# Patient Record
Sex: MALE | Race: BLACK OR AFRICAN AMERICAN
[De-identification: names, ages, dates, MRNs, and addresses within clinical notes are randomized per-mention and may not be internally consistent; named-entity substitution may affect disease eponyms.]

---

## 2019-06-16 ENCOUNTER — HOSPITAL ENCOUNTER (EMERGENCY)
Dept: HOSPITAL 75 - ER FS | Age: 64
Discharge: HOME | End: 2019-06-16
Payer: COMMERCIAL

## 2019-06-16 VITALS — DIASTOLIC BLOOD PRESSURE: 87 MMHG | SYSTOLIC BLOOD PRESSURE: 122 MMHG

## 2019-06-16 VITALS — WEIGHT: 315 LBS | HEIGHT: 67 IN | BODY MASS INDEX: 49.44 KG/M2
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
## 2019-06-16 DIAGNOSIS — S93.401A: Primary | ICD-10-CM

## 2019-06-16 PROCEDURE — 73610 X-RAY EXAM OF ANKLE: CPT

## 2019-06-16 NOTE — DIAGNOSTIC IMAGING REPORT
INDICATION: Right ankle injury. Pain



3 views of the right ankle show no fracture, dislocation or other

acute abnormality.



IMPRESSION: No acute abnormality is seen.



Dictated by: 



  Dictated on workstation # [id number]

## 2019-06-16 NOTE — ED LOWER EXTREMITY
General


Chief Complaint:  Lower Extremity


Stated Complaint:  RT ANKLE PAIN


Nursing Triage Note:  


Patient reports he jumped off a fork lift and landed wrong on his right ankle on




Thursday, states his ankle has hurt with ambulation and movement since Thursday.


Nursing Sepsis Screen:  No Definite Risk


Source:  patient


Exam Limitations:  no limitations





History of Present Illness


Date Seen by Provider:  Jun 16, 2019


Time Seen by Provider:  08:06


Initial Comments


The patient is a 63-year-old black male who reports that he jumped off a 

forklift on Thursday.  He does not believe that he rolled his ankle but had pain

almost immediately  upon landing.  The pain has continued and he had expected it

to be better by now


Onset:  last week


Severity:  moderate





Allergies and Home Medications


Allergies


Coded Allergies:  


     No Known Drug Allergies (Unverified , 6/16/19)





Patient Home Medication List


Home Medication List Reviewed:  Yes





Review of Systems


Constitutional:  see HPI


EENTM:  no symptoms reported


Respiratory:  no symptoms reported


Cardiovascular:  no symptoms reported


Gastrointestinal:  no symptoms reported


Genitourinary:  no symptoms reported


Musculoskeletal:  see HPI


Psychiatric/Neurological:  No Symptoms Reported





Past Medical-Social-Family Hx


Past Med/Social Hx:  Reviewed Nursing Past Med/Soc Hx


Patient Social History


Recent Foreign Travel:  No


Contact w/Someone Who Travel:  No


Recent Infectious Disease Expo:  No





Physical Exam


Vital Signs





Vital Signs - First Documented








 6/16/19





 07:57


 


Temp 98.7


 


Pulse 99


 


Resp 16


 


B/P (MAP) 122/87 (99)


 


Pulse Ox 97


 


O2 Delivery Room Air





Capillary Refill : Less Than 3 Seconds


Height, Weight, BMI


Height: 5'7.00"


Weight: 340lbs. oz. 154.327941ll;  BMI


Method:Stated


General Appearance:  mild distress


HEENT:  normal ENT inspection


Neck:  full range of motion


Cardiovascular:  normal peripheral pulses, regular rate, rhythm, no edema, no 

gallop, no JVD, no murmur


Respiratory:  chest non-tender, lungs clear, normal breath sounds, no r

espiratory distress, no accessory muscle use


Gastrointestinal:  normal bowel sounds, non tender, soft, no organomegaly, no 

pulsatile mass


Back:  normal inspection, no CVA tenderness, no vertebral tenderness


Examination of the feet shows them to be symmetrical by observation.  There is 

tenderness to palpation over the anterior surface of the Talus.  There is pain 

to passive inversion of the foot.





Progress/Results/Core Measures


Results/Orders


My Orders





Orders - NEVIN VELOZ MD


Ankle 3 View Right (6/16/19 08:04)





Vital Signs/I&O











 6/16/19





 07:57


 


Temp 98.7


 


Pulse 99


 


Resp 16


 


B/P (MAP) 122/87 (99)


 


Pulse Ox 97


 


O2 Delivery Room Air














Blood Pressure Mean:                    99











Departure


Communication (Admissions)


By my reading no fractures noted in the right foot or ankle.  A 4 inch Ace wrap 

was placed.





Impression





   Primary Impression:  


   right ankle sprain


Disposition:  01 HOME, SELF-CARE


Condition:  Stable/Unchanged





Departure-Patient Inst.


Decision time for Depature:  08:30


Patient Instructions:  Ankle Sprain (DC)





Add. Discharge Instructions:  


All discharge instructions reviewed with patient and/or family. Voiced 

understanding.





Use Ace wrap and supportive shoes.  Hot soaks of foot and ankle may be useful 

over the next few days.  Aleve 2 tablets twice daily or ibuprofen 3 tablets 3 

times daily will also be useful.  If no improvement over the next week see your 

provider for further evaluation











NEVIN VELOZ MD             Jun 16, 2019 08:09

## 2019-07-04 ENCOUNTER — HOSPITAL ENCOUNTER (EMERGENCY)
Dept: HOSPITAL 75 - ER FS | Age: 64
Discharge: HOME | End: 2019-07-04
Payer: SELF-PAY

## 2019-07-04 VITALS — DIASTOLIC BLOOD PRESSURE: 67 MMHG | SYSTOLIC BLOOD PRESSURE: 115 MMHG

## 2019-07-04 VITALS — HEIGHT: 67.5 IN | WEIGHT: 315 LBS | BODY MASS INDEX: 48.86 KG/M2

## 2019-07-04 DIAGNOSIS — I10: ICD-10-CM

## 2019-07-04 DIAGNOSIS — I95.1: Primary | ICD-10-CM

## 2019-07-04 LAB
ALBUMIN SERPL-MCNC: 3.6 GM/DL (ref 3.2–4.5)
ALP SERPL-CCNC: 51 U/L (ref 40–136)
ALT SERPL-CCNC: 18 U/L (ref 0–55)
APTT PPP: YELLOW S
BACTERIA #/AREA URNS HPF: NEGATIVE /HPF
BASOPHILS # BLD AUTO: 0.1 10^3/UL (ref 0–0.1)
BASOPHILS NFR BLD AUTO: 1 % (ref 0–10)
BILIRUB SERPL-MCNC: 0.5 MG/DL (ref 0.1–1)
BILIRUB UR QL STRIP: (no result)
BUN/CREAT SERPL: 16
CALCIUM SERPL-MCNC: 9.8 MG/DL (ref 8.5–10.1)
CHLORIDE SERPL-SCNC: 95 MMOL/L (ref 98–107)
CO2 SERPL-SCNC: 24 MMOL/L (ref 21–32)
CREAT SERPL-MCNC: 0.91 MG/DL (ref 0.6–1.3)
EOSINOPHIL # BLD AUTO: 0.2 10^3/UL (ref 0–0.3)
EOSINOPHIL NFR BLD AUTO: 2 % (ref 0–10)
ERYTHROCYTE [DISTWIDTH] IN BLOOD BY AUTOMATED COUNT: 15.8 % (ref 10–14.5)
FIBRINOGEN PPP-MCNC: CLEAR MG/DL
GFR SERPLBLD BASED ON 1.73 SQ M-ARVRAT: > 60 ML/MIN
GLUCOSE SERPL-MCNC: 82 MG/DL (ref 70–105)
GLUCOSE UR STRIP-MCNC: NEGATIVE MG/DL
HCT VFR BLD CALC: 44 % (ref 40–54)
HGB BLD-MCNC: 14.6 G/DL (ref 13.3–17.7)
HYALINE CASTS #/AREA URNS LPF: (no result) /LPF
KETONES UR QL STRIP: (no result)
LEUKOCYTE ESTERASE UR QL STRIP: NEGATIVE
LYMPHOCYTES # BLD AUTO: 1.8 X 10^3 (ref 1–4)
LYMPHOCYTES NFR BLD AUTO: 15 % (ref 12–44)
MANUAL DIFFERENTIAL PERFORMED BLD QL: NO
MCH RBC QN AUTO: 30 PG (ref 25–34)
MCHC RBC AUTO-ENTMCNC: 33 G/DL (ref 32–36)
MCV RBC AUTO: 91 FL (ref 80–99)
MONOCYTES # BLD AUTO: 0.8 X 10^3 (ref 0–1)
MONOCYTES NFR BLD AUTO: 7 % (ref 0–12)
NEUTROPHILS # BLD AUTO: 8.9 X 10^3 (ref 1.8–7.8)
NEUTROPHILS NFR BLD AUTO: 75 % (ref 42–75)
NITRITE UR QL STRIP: NEGATIVE
PH UR STRIP: 5.5 [PH] (ref 5–9)
PLATELET # BLD: 275 10^3/UL (ref 130–400)
PMV BLD AUTO: 10.8 FL (ref 7.4–10.4)
POTASSIUM SERPL-SCNC: 3.1 MMOL/L (ref 3.6–5)
PROT SERPL-MCNC: 7.9 GM/DL (ref 6.4–8.2)
PROT UR QL STRIP: (no result)
RBC #/AREA URNS HPF: (no result) /HPF
SODIUM SERPL-SCNC: 138 MMOL/L (ref 135–145)
SP GR UR STRIP: 1.02 (ref 1.02–1.02)
SQUAMOUS #/AREA URNS HPF: (no result) /HPF
UROBILINOGEN UR-MCNC: NORMAL MG/DL
WBC # BLD AUTO: 11.9 10^3/UL (ref 4.3–11)
WBC #/AREA URNS HPF: (no result) /HPF

## 2019-07-04 PROCEDURE — 81000 URINALYSIS NONAUTO W/SCOPE: CPT

## 2019-07-04 PROCEDURE — 80053 COMPREHEN METABOLIC PANEL: CPT

## 2019-07-04 PROCEDURE — 36415 COLL VENOUS BLD VENIPUNCTURE: CPT

## 2019-07-04 PROCEDURE — 71045 X-RAY EXAM CHEST 1 VIEW: CPT

## 2019-07-04 PROCEDURE — 84484 ASSAY OF TROPONIN QUANT: CPT

## 2019-07-04 PROCEDURE — 85025 COMPLETE CBC W/AUTO DIFF WBC: CPT

## 2019-07-04 PROCEDURE — 93005 ELECTROCARDIOGRAM TRACING: CPT

## 2019-07-04 NOTE — ED GENERAL
General


Chief Complaint:  Dizziness/Syncope


Stated Complaint:  MEDS CAUSING PT TO BE LIGHTHEADED


Source of Information:  Patient


Exam Limitations:  No Limitations





History of Present Illness


Date Seen by Provider:  Jul 4, 2019


Time Seen by Provider:  09:44


Initial Comments


This 63-year-old male presents with a complaint of orthostatic dizziness 

intermittently since he has switched his blood pressure medication.





Patient denies associated loss of consciousness or syncope, chest pain or 

palpitations, associated fever, chills, nausea or vomiting, lateralizing or 

localizing neurologic complaints, similar episode in the past, shortness of 

breath or cough, or use of other medication.





Further history from the patient reveals that he is actually taking both of his 

blood pressure medications (hydrochlorothiazide 25 mg one daily and lisinopril 

20 mg 1 daily).





Allergies and Home Medications


Allergies


Coded Allergies:  


     No Known Drug Allergies (Unverified , 6/16/19)





Patient Home Medication List


Home Medication List Reviewed:  Yes





Review of Systems


Review of Systems


Constitutional:  No chills; dizziness (orthostatic in nature and usually when 

the patient is outside and he is taking his current antihypertensive 

medication.)


EENTM:  no symptoms reported


Respiratory:  No cough, No short of breath


Cardiovascular:  No chest pain, No palpitations


Gastrointestinal:  No abdominal pain, No nausea, No vomiting


Genitourinary:  No decreased output, No dysuria


Musculoskeletal:  No back pain


Skin:  No change in color, No rash


Psychiatric/Neurological:  No Symptoms Reported


Hematologic/Lymphatic:  No Symptoms Reported


Immunological/Allergic:  no symptoms reported





Past Medical-Social-Family Hx


Past Med/Social Hx:  Reviewed Nursing Past Med/Soc Hx


Patient Social History


2nd Hand Smoke Exposure:  No


Recent Hopitalizations:  No





Seasonal Allergies


Seasonal Allergies:  No





Past Medical History


Surgeries:  No


Respiratory:  No


Cardiac:  Yes


Hypertension


Neurological:  No


Genitourinary:  No


Gastrointestinal:  No


Musculoskeletal:  No


Endocrine:  No


HEENT:  No


Cancer:  No


Psychosocial:  No


Integumentary:  No


Blood Disorders:  No





Physical Exam


Vital Signs





Vital Signs - First Documented








 7/4/19





 09:26


 


Temp 98.3


 


Pulse 89


 


Resp 20


 


B/P (MAP) 136/74 (94)


 


Pulse Ox 96


 


O2 Delivery Room Air





Capillary Refill :


Height, Weight, BMI


Height: 5'7.00"


Weight: 340lbs. oz. 154.372668gh;  BMI


Method:Stated


General Appearance:  No Apparent Distress, WD/WN


Eyes:  Bilateral Eye Normal Inspection


HEENT:  Normal ENT Inspection


Neck:  Normal Inspection, Supple


Respiratory:  Lungs Clear, Normal Breath Sounds


Cardiovascular:  Regular Rate, Rhythm, No Murmur


Gastrointestinal:  Normal Bowel Sounds, Non Tender, Soft


Extremity:  Normal Inspection


Neurologic/Psychiatric:  Oriented x3, No Motor/Sensory Deficits, Normal 

Mood/Affect


Skin:  Normal Color, Warm/Dry





Progress/Results/Core Measures


Suspected Sepsis


SIRS


Temperature: 


Pulse:  


Respiratory Rate: 


 


Laboratory Tests


7/4/19 09:38: White Blood Count 11.9H


Blood Pressure  / 


Mean: 


 





Laboratory Tests


7/4/19 09:38: 


Creatinine 0.91, Platelet Count 275, Total Bilirubin 0.5








Results/Orders


Lab Results





Laboratory Tests








Test


 7/4/19


09:38 7/4/19


10:06 Range/Units


 


 


White Blood Count


 11.9 H


 


 4.3-11.0


10^3/uL


 


Red Blood Count


 4.85 


 


 4.35-5.85


10^6/uL


 


Hemoglobin 14.6   13.3-17.7  G/DL


 


Hematocrit 44   40-54  %


 


Mean Corpuscular Volume 91   80-99  FL


 


Mean Corpuscular Hemoglobin 30   25-34  PG


 


Mean Corpuscular Hemoglobin


Concent 33 


 


 32-36  G/DL





 


Red Cell Distribution Width 15.8 H  10.0-14.5  %


 


Platelet Count


 275 


 


 130-400


10^3/uL


 


Mean Platelet Volume 10.8 H  7.4-10.4  FL


 


Neutrophils (%) (Auto) 75   42-75  %


 


Lymphocytes (%) (Auto) 15   12-44  %


 


Monocytes (%) (Auto) 7   0-12  %


 


Eosinophils (%) (Auto) 2   0-10  %


 


Basophils (%) (Auto) 1   0-10  %


 


Neutrophils # (Auto) 8.9 H  1.8-7.8  X 10^3


 


Lymphocytes # (Auto) 1.8   1.0-4.0  X 10^3


 


Monocytes # (Auto) 0.8   0.0-1.0  X 10^3


 


Eosinophils # (Auto)


 0.2 


 


 0.0-0.3


10^3/uL


 


Basophils # (Auto)


 0.1 


 


 0.0-0.1


10^3/uL


 


Sodium Level 138   135-145  MMOL/L


 


Potassium Level 3.1 L  3.6-5.0  MMOL/L


 


Chloride Level 95 L    MMOL/L


 


Carbon Dioxide Level 24   21-32  MMOL/L


 


Anion Gap 19 H  5-14  MMOL/L


 


Blood Urea Nitrogen 15   7-18  MG/DL


 


Creatinine


 0.91 


 


 0.60-1.30


MG/DL


 


Estimat Glomerular Filtration


Rate > 60 


 


  





 


BUN/Creatinine Ratio 16    


 


Glucose Level 82     MG/DL


 


Calcium Level 9.8   8.5-10.1  MG/DL


 


Corrected Calcium 10.1   8.5-10.1  MG/DL


 


Total Bilirubin 0.5   0.1-1.0  MG/DL


 


Aspartate Amino Transf


(AST/SGOT) 36 H


 


 5-34  U/L





 


Alanine Aminotransferase


(ALT/SGPT) 18 


 


 0-55  U/L





 


Alkaline Phosphatase 51     U/L


 


Troponin I < 0.30   <0.30  NG/ML


 


Total Protein 7.9   6.4-8.2  GM/DL


 


Albumin 3.6   3.2-4.5  GM/DL


 


Urine Color  YELLOW   


 


Urine Clarity  CLEAR   


 


Urine pH  5.5  5-9  


 


Urine Specific Gravity  1.025 H 1.016-1.022  


 


Urine Protein  TRACE  NEGATIVE  


 


Urine Glucose (UA)  NEGATIVE  NEGATIVE  


 


Urine Ketones  3+ H NEGATIVE  


 


Urine Nitrite  NEGATIVE  NEGATIVE  


 


Urine Bilirubin  2+ H NEGATIVE  


 


Urine Urobilinogen  NORMAL  NORMAL  MG/DL


 


Urine Leukocyte Esterase  NEGATIVE  NEGATIVE  


 


Urine RBC (Auto)  NEGATIVE  NEGATIVE  


 


Urine RBC  NONE   /HPF


 


Urine WBC  NONE   /HPF


 


Urine Squamous Epithelial


Cells 


 25-50 H


  /HPF





 


Urine Crystals  NONE   /LPF


 


Urine Bacteria  NEGATIVE   /HPF


 


Urine Casts  PRESENT   /LPF


 


Urine Hyaline Casts  2-5 H  /LPF


 


Urine Mucus  SMALL H  /LPF


 


Urine Culture Indicated  NO   








My Orders





Orders - JARRET WOODS MD


Ekg Tracing (7/4/19 09:41)


Chest 1 View Ap/Pa Only (7/4/19 09:41)


Cbc With Automated Diff (7/4/19 09:41)


Comprehensive Metabolic Panel (7/4/19 09:41)


Ua Culture If Indicated (7/4/19 09:41)


Troponin I (7/4/19 09:41)





Vital Signs/I&O











 7/4/19





 09:26


 


Temp 98.3


 


Pulse 89


 


Resp 20


 


B/P (MAP) 136/74 (94)


 


Pulse Ox 96


 


O2 Delivery Room Air





Capillary Refill :


Progress Note :  


   Time:  10:53


Progress Note


The patient demonstrated no significant orthostatic changes in the emergency 

department.





After was understood A she was taking both hydrochlorothiazide and lisinopril 

the patient was counseled that the 2 medications together appeared to be causing

him orthostatic hypotension.





It was recommended patient utilize his hydrochlorothiazide 25 mg daily and with 

hold the lisinopril currently.





I follow up with his caregiver after the holiday weekend.  I believe further 

monitoring of his blood pressure is in order to see if he requires the second 

antihypertensive medication.





Departure


Impression





   Primary Impression:  


   Orthostatic hypotension


Disposition:  01 HOME, SELF-CARE


Condition:  Unchanged





Departure-Patient Inst.


Decision time for Depature:  10:55


Referrals:  


[facility]





INDIA,LOCAL PHYSICIAN (PCP)


Primary Care Physician


Patient Instructions:  Orthostatic Hypotension (DC)





Add. Discharge Instructions:  


Take your hydrochlorothiazide as prescribed (25 mg daily).  Stop your 

lisinopril.  Follow-up with your care giver of choice on Monday.  If possible 

monitor your blood pressure in the interim.  Return if any problems or 

questions.  All discharge instructions reviewed with patient and/or family. 

Voiced understanding.











JARRET WOODS MD              Jul 4, 2019 09:54

## 2019-07-04 NOTE — DIAGNOSTIC IMAGING REPORT
Chest one view at 9:37 a.m.



INDICATION: Dizziness, hypertension.



There are no prior studies available for comparison.



FINDINGS: The heart size is within normal limits. The lungs are

clear. There is no evidence for failure, pneumonia or for pleural

effusion. The mediastinum is not widened. The osseous structures

are intact.



IMPRESSION: There is no evidence for an acute cardiopulmonary

abnormality.



Dictated by: 



  Dictated on workstation # [id number]

## 2020-03-25 ENCOUNTER — HOSPITAL ENCOUNTER (EMERGENCY)
Dept: HOSPITAL 75 - ER FS | Age: 65
Discharge: HOME | End: 2020-03-25
Payer: COMMERCIAL

## 2020-03-25 VITALS — WEIGHT: 250 LBS | BODY MASS INDEX: 38.78 KG/M2 | HEIGHT: 67.32 IN

## 2020-03-25 VITALS — DIASTOLIC BLOOD PRESSURE: 90 MMHG | SYSTOLIC BLOOD PRESSURE: 138 MMHG

## 2020-03-25 DIAGNOSIS — E87.6: ICD-10-CM

## 2020-03-25 DIAGNOSIS — K05.6: ICD-10-CM

## 2020-03-25 DIAGNOSIS — D72.829: ICD-10-CM

## 2020-03-25 DIAGNOSIS — M54.16: Primary | ICD-10-CM

## 2020-03-25 LAB
ALBUMIN SERPL-MCNC: 3.5 GM/DL (ref 3.2–4.5)
ALP SERPL-CCNC: 61 U/L (ref 40–136)
ALT SERPL-CCNC: 7 U/L (ref 0–55)
ANISOCYTOSIS BLD QL SMEAR: SLIGHT
APTT PPP: YELLOW S
BACTERIA #/AREA URNS HPF: (no result) /HPF
BARBITURATES UR QL: NEGATIVE
BASOPHILS # BLD AUTO: 0 10^3/UL (ref 0–0.1)
BASOPHILS NFR BLD AUTO: 0 % (ref 0–10)
BASOPHILS NFR BLD MANUAL: 0 %
BENZODIAZ UR QL SCN: NEGATIVE
BILIRUB SERPL-MCNC: 1 MG/DL (ref 0.1–1)
BILIRUB UR QL STRIP: NEGATIVE
BUN/CREAT SERPL: 8
CALCIUM SERPL-MCNC: 9.2 MG/DL (ref 8.5–10.1)
CHLORIDE SERPL-SCNC: 92 MMOL/L (ref 98–107)
CO2 SERPL-SCNC: 37 MMOL/L (ref 21–32)
COCAINE UR QL: NEGATIVE
CREAT SERPL-MCNC: 0.9 MG/DL (ref 0.6–1.3)
EOSINOPHIL # BLD AUTO: 0.1 10^3/UL (ref 0–0.3)
EOSINOPHIL NFR BLD AUTO: 0 % (ref 0–10)
EOSINOPHIL NFR BLD MANUAL: 0 %
ERYTHROCYTE [DISTWIDTH] IN BLOOD BY AUTOMATED COUNT: 15.9 % (ref 10–14.5)
FIBRINOGEN PPP-MCNC: CLEAR MG/DL
GFR SERPLBLD BASED ON 1.73 SQ M-ARVRAT: > 60 ML/MIN
GLUCOSE SERPL-MCNC: 103 MG/DL (ref 70–105)
GLUCOSE UR STRIP-MCNC: NEGATIVE MG/DL
HCT VFR BLD CALC: 38 % (ref 40–54)
HGB BLD-MCNC: 13.4 G/DL (ref 13.3–17.7)
KETONES UR QL STRIP: NEGATIVE
LEUKOCYTE ESTERASE UR QL STRIP: NEGATIVE
LYMPHOCYTES # BLD AUTO: 1.4 X 10^3 (ref 1–4)
LYMPHOCYTES NFR BLD AUTO: 10 % (ref 12–44)
MCH RBC QN AUTO: 34 PG (ref 25–34)
MCHC RBC AUTO-ENTMCNC: 35 G/DL (ref 32–36)
MCV RBC AUTO: 97 FL (ref 80–99)
METHADONE UR QL SCN: NEGATIVE
METHAMPHETAMINE SCREEN URINE S: NEGATIVE
MONOCYTES # BLD AUTO: 0.9 X 10^3 (ref 0–1)
MONOCYTES NFR BLD AUTO: 6 % (ref 0–12)
MONOCYTES NFR BLD: 9 %
NEUTROPHILS # BLD AUTO: 11.7 X 10^3 (ref 1.8–7.8)
NEUTROPHILS NFR BLD AUTO: 83 % (ref 42–75)
NEUTS BAND NFR BLD MANUAL: 79 %
NEUTS BAND NFR BLD: 2 %
NITRITE UR QL STRIP: NEGATIVE
OPIATES UR QL SCN: NEGATIVE
OXYCODONE UR QL: NEGATIVE
PH UR STRIP: 8 [PH] (ref 5–9)
PLATELET # BLD: 198 10^3/UL (ref 130–400)
PMV BLD AUTO: 10.6 FL (ref 7.4–10.4)
POTASSIUM SERPL-SCNC: 2.6 MMOL/L (ref 3.6–5)
PROPOXYPH UR QL: NEGATIVE
PROT SERPL-MCNC: 7.4 GM/DL (ref 6.4–8.2)
PROT UR QL STRIP: NEGATIVE
RBC #/AREA URNS HPF: (no result) /HPF
SODIUM SERPL-SCNC: 140 MMOL/L (ref 135–145)
SP GR UR STRIP: 1.01 (ref 1.02–1.02)
SQUAMOUS #/AREA URNS HPF: (no result) /HPF
TRICYCLICS UR QL SCN: POSITIVE
VARIANT LYMPHS NFR BLD MANUAL: 10 %
WBC # BLD AUTO: 14.1 10^3/UL (ref 4.3–11)
WBC #/AREA URNS HPF: (no result) /HPF

## 2020-03-25 PROCEDURE — 81000 URINALYSIS NONAUTO W/SCOPE: CPT

## 2020-03-25 PROCEDURE — 80306 DRUG TEST PRSMV INSTRMNT: CPT

## 2020-03-25 PROCEDURE — 87088 URINE BACTERIA CULTURE: CPT

## 2020-03-25 PROCEDURE — 85027 COMPLETE CBC AUTOMATED: CPT

## 2020-03-25 PROCEDURE — 36415 COLL VENOUS BLD VENIPUNCTURE: CPT

## 2020-03-25 PROCEDURE — 85007 BL SMEAR W/DIFF WBC COUNT: CPT

## 2020-03-25 PROCEDURE — 72100 X-RAY EXAM L-S SPINE 2/3 VWS: CPT

## 2020-03-25 PROCEDURE — 80053 COMPREHEN METABOLIC PANEL: CPT

## 2020-03-25 NOTE — ED BACK PAIN
General


Chief Complaint:  Back Problems


Stated Complaint:  BACK/PIO SIDE PAIN


Source of Information:  Patient


Exam Limitations:  No Limitations (patient seems to have been impaired 

intellect)





History of Present Illness


Date Seen by Provider:  Mar 25, 2020


Time Seen by Provider:  12:11


Initial Comments


64-year-old -American male presents to the emergency room for the second 

time this week with low back pain. Patient states he has occasional spasms going

down the lateral aspect of both legs. He states he has no loss of control of his

bowel or bladder. 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 

increased pain over the past few days and came into the emergency room and 

received muscle relaxants in the past. Patient has no history of falls he has no

history of surgery in the back he has no pain on pressing the spinous processes 

from his cervical down to his sacral areas. Patient has given informed consent 

for diagnostic and therapeutic services. He denies any other serious medical 

problems including cardiovascular pulmonary GI or renal.


Location:  Lumbar Spine (with some radiation into the lateral thighs bilaterally

with spasms)


Timing/Duration:  3-4 Days


Severity:  Moderate


Pain/Injury Location:  Back, Lower Extremity


Radiation:  Upper Legs (lateral thighs bilaterally)


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


Modifying Factors:  Improves With Movement, Improves With Rest


Associated Symptoms:  muscle spasms, weakness, lower back pain





Allergies and Home Medications


Allergies


Coded Allergies:  


     No Known Drug Allergies (Unverified , 6/16/19)





Home Medications


Cyclobenzaprine HCl 10 Mg Tablet, 10 MG PO TID PRN for SPASMS


   Prescribed by: HODA BOB on 3/25/20 1404


Ibuprofen 600 Mg Tablet, 600 MG PO Q6H PRN for PAIN-MILD


   Prescribed by: HODA BOB on 3/25/20 1404





Patient Home Medication List


Home Medication List Reviewed:  Yes





Review of Systems


Constitutional:  weakness (secondary to spasms in the lateral thighs however 

does not report weakness when he is not in spasms)


EENTM:  no symptoms reported, dental problems


Respiratory:  no symptoms reported


Cardiovascular:  no symptoms reported


Gastrointestinal:  no symptoms reported


Genitourinary:  no symptoms reported (denies cauda equina syndrome symptoms)


Musculoskeletal:  see HPI, back pain, muscle pain, muscle stiffness, muscle 

cramps


Skin:  no symptoms reported


Psychiatric/Neurological:  Anxiety





Past Medical-Social-Family Hx


Past Med/Social Hx:  Reviewed Nursing Past Med/Soc Hx


Patient Social History


Alcohol Use:  Denies Use


Recreational Drug Use:  No


Smoking Status:  Unknown if Ever Smoked


Type Used:  Smokeless Tobacco


2nd Hand Smoke Exposure:  No


Recent Foreign Travel:  No


Contact w/Someone Who Travel:  No


Recent Hopitalizations:  No





Seasonal Allergies


Seasonal Allergies:  No





Past Medical History


Surgeries:  No


Respiratory:  No


Cardiac:  Yes


Hypertension


Neurological:  No


Genitourinary:  No


Gastrointestinal:  No


Musculoskeletal:  No


Endocrine:  No


HEENT:  No


Cancer:  No


Psychosocial:  No


Integumentary:  No


Blood Disorders:  No





Physical Exam


Vital Signs





Vital Signs - First Documented








 3/25/20





 12:27


 


Temp 36.6


 


Pulse 101


 


Resp 18


 


B/P (MAP) 138/90 (106)


 


Pulse Ox 97





Capillary Refill :


Height, Weight, BMI


Height: 5'7.50"


Weight: 365lbs. 0oz. [id number];  BMI


Method:Estimated


General Appearance:  WD/WN (but morbidly obese), Moderate Distress (secondary to

low back pain)


HEENT:  PERRL/EOMI, Normal ENT Inspection, Pharynx Normal, Other (periodontal 

disease and missing teeth)


Neck:  Full Range of Motion, Normal Inspection, Non Tender, Supple


Cardiovascular:  Regular Rate, Rhythm, No Edema, No Gallop, No JVD, No Murmur, 

Normal Peripheral Pulses


Respiratory:  Chest Non Tender, Lungs Clear, Normal Breath Sounds, No Accessory 

Muscle Use, No Respiratory Distress


Peripheral Pulses:  2+ Carotid (R), 2+ Carotid (L), 2+ Radial Pulses (R), 2+ 

Radial Pulses (L)


Gastrointestinal:  Normal Bowel Sounds (morbidly obese difficult abdominal 

exam), Non Tender, Soft


Back:  CVA Tenderness (L), CVA Tenderness (R), Decreased Range of Motion, Muscle

Spasm, Other (bilateral radiculopathy lateral thighs L3 4)


Extremity:  Normal Capillary Refill, Normal Inspection, Normal Range of Motion, 

Non Tender, No Calf Tenderness, No Pedal Edema


Neurologic/Psychiatric:  Alert, Oriented x3, No Motor/Sensory Deficits (appears 

to have some intellectual deficits), Normal Mood/Affect (but anxious), CNs II-

XII Norm as Tested


Skin:  Normal Color, Warm/Dry (no open skin lesions)


Lymphatic:  No Adenopathy





Progress/Results/Core Measures


Results/Orders


Lab Results





Laboratory Tests








Test


 3/25/20


12:52 3/25/20


13:05 Range/Units


 


 


White Blood Count


 14.1 H


 


 4.3-11.0


10^3/uL


 


Red Blood Count


 3.96 L


 


 4.35-5.85


10^6/uL


 


Hemoglobin 13.4   13.3-17.7  G/DL


 


Hematocrit 38 L  40-54  %


 


Mean Corpuscular Volume 97   80-99  FL


 


Mean Corpuscular Hemoglobin 34   25-34  PG


 


Mean Corpuscular Hemoglobin


Concent 35 


 


 32-36  G/DL





 


Red Cell Distribution Width 15.9 H  10.0-14.5  %


 


Platelet Count


 198 


 


 130-400


10^3/uL


 


Mean Platelet Volume 10.6 H  7.4-10.4  FL


 


Neutrophils (%) (Auto) 83 H  42-75  %


 


Lymphocytes (%) (Auto) 10 L  12-44  %


 


Monocytes (%) (Auto) 6   0-12  %


 


Eosinophils (%) (Auto) 0   0-10  %


 


Basophils (%) (Auto) 0   0-10  %


 


Neutrophils # (Auto) 11.7 H  1.8-7.8  X 10^3


 


Lymphocytes # (Auto) 1.4   1.0-4.0  X 10^3


 


Monocytes # (Auto) 0.9   0.0-1.0  X 10^3


 


Eosinophils # (Auto)


 0.1 


 


 0.0-0.3


10^3/uL


 


Basophils # (Auto)


 0.0 


 


 0.0-0.1


10^3/uL


 


Neutrophils % (Manual) 79    %


 


Lymphocytes % (Manual) 10    %


 


Monocytes % (Manual) 9    %


 


Eosinophils % (Manual) 0    %


 


Basophils % (Manual) 0    %


 


Band Neutrophils 2    %


 


Anisocytosis SLIGHT    


 


Sodium Level 140   135-145  MMOL/L


 


Potassium Level 2.6 L  3.6-5.0  MMOL/L


 


Chloride Level 92 L    MMOL/L


 


Carbon Dioxide Level 37 H  21-32  MMOL/L


 


Anion Gap 11   5-14  MMOL/L


 


Blood Urea Nitrogen 7   7-18  MG/DL


 


Creatinine


 0.90 


 


 0.60-1.30


MG/DL


 


Estimat Glomerular Filtration


Rate > 60 


 


  





 


BUN/Creatinine Ratio 8    


 


Glucose Level 103     MG/DL


 


Calcium Level 9.2   8.5-10.1  MG/DL


 


Corrected Calcium 9.6   8.5-10.1  MG/DL


 


Total Bilirubin 1.0   0.1-1.0  MG/DL


 


Aspartate Amino Transf


(AST/SGOT) 16 


 


 5-34  U/L





 


Alanine Aminotransferase


(ALT/SGPT) 7 


 


 0-55  U/L





 


Alkaline Phosphatase 61     U/L


 


Total Protein 7.4   6.4-8.2  GM/DL


 


Albumin 3.5   3.2-4.5  GM/DL


 


Urine Color  YELLOW   


 


Urine Clarity  CLEAR   


 


Urine pH  8.0  5-9  


 


Urine Specific Gravity  1.015 L 1.016-1.022  


 


Urine Protein  NEGATIVE  NEGATIVE  


 


Urine Glucose (UA)  NEGATIVE  NEGATIVE  


 


Urine Ketones  NEGATIVE  NEGATIVE  


 


Urine Nitrite  NEGATIVE  NEGATIVE  


 


Urine Bilirubin  NEGATIVE  NEGATIVE  


 


Urine Urobilinogen  0.2  < = 1.0  MG/DL


 


Urine Leukocyte Esterase  NEGATIVE  NEGATIVE  


 


Urine RBC (Auto)  NEGATIVE  NEGATIVE  


 


Urine RBC  NONE   /HPF


 


Urine WBC  5-10 H  /HPF


 


Urine Squamous Epithelial


Cells 


 0-2 


  /HPF





 


Urine Crystals  NONE   /LPF


 


Urine Bacteria  TRACE   /HPF


 


Urine Casts  NONE   /LPF


 


Urine Mucus  NEGATIVE   /LPF


 


Urine Culture Indicated  YES   


 


Urine Opiates Screen  NEGATIVE  NEGATIVE  


 


Urine Oxycodone Screen  NEGATIVE  NEGATIVE  


 


Urine Methadone Screen  NEGATIVE  NEGATIVE  


 


Urine Propoxyphene Screen  NEGATIVE  NEGATIVE  


 


Urine Barbiturates Screen  NEGATIVE  NEGATIVE  


 


Ur Tricyclic Antidepressants


Screen 


 POSITIVE H


 NEGATIVE  





 


Urine Phencyclidine Screen  NEGATIVE  NEGATIVE  


 


Urine Amphetamines Screen  NEGATIVE  NEGATIVE  


 


Urine Methamphetamines Screen  NEGATIVE  NEGATIVE  


 


Urine Benzodiazepines Screen  NEGATIVE  NEGATIVE  


 


Urine Cocaine Screen  NEGATIVE  NEGATIVE  


 


Urine Cannabinoids Screen  NEGATIVE  NEGATIVE  








My Orders





Orders - HODA BOB H DO


Lumbar Spine 2 Or 3 View (3/25/20 12:26)


Urinalysis (3/25/20 12:26)


Drug Screen Stat (Urine) (3/25/20 12:26)


Cbc And Manual Diff (3/25/20 12:26)


Comprehensive Metabolic Panel (3/25/20 12:26)


Urine Culture (3/25/20 13:05)


Ketorolac Injection (Toradol Injection) (3/25/20 14:15)





Vital Signs/I&O











 3/25/20 3/25/20





 12:27 14:20


 


Temp 36.6 36.6


 


Pulse 101 101


 


Resp 18 18


 


B/P (MAP) 138/90 (106) 138/90 (106)


 


Pulse Ox 97 97











Progress


Progress Note :  


   Time:  13:46


Progress Note


Diagnostic evaluation reveals a leukocytosis white count of 14.1 potassium 2.6 

urine drug screen positive for tricyclics patient's UA shows 5-10 WBCs per high-

power field slightly elevated and LS-spine shows degenerative joint disease. 

Patient does have mild upper respiratory tract infection in addition to lumbar 

arthritis. Recommendation is to drink additional fluids vitamin C thousand 

milligrams a day and ibuprofen for lumbago. Patient does have a mild 

leukocytosis which could be explained by the URI however if he has fevers or 

feels a decompensate she should return emergency room. Patient is hypokalemic 

and will be given oral potassium chloride. Patient subsequently admitted that he

is taking medication for tuberculosis exposure although he says at the urgent 

care clinic [facility] clinic is provided [facility]. Patient agrees increase 

his fluids vitamin C 1000 mg a day ibuprofen and will get a shot of 60 mg of 

Toradol today. He will follow-up with the [facility] clinic. The patient is 

aware that his white count is elevated could be from his periodontal disease and

his mild URI but will contact us or the urgent care clinic if his temperature 

goes up or he feels that he is getting worse. Patient has asked for refill of 

his muscle relaxant.





Departure


Impression





   Primary Impression:  


   Lumbar radiculopathy


   Additional Impressions:  


   Chronic back pain


   Hypokalemia


   Leukocytosis


   Periodontal disease


Disposition:  01 HOME, SELF-CARE


Condition:  Improved





Departure-Patient Inst.


Decision time for Depature:  13:52


Referrals:  


[facility]/LANCE OSBORNE,LOCAL PHYSICIAN (PCP)


Primary Care Physician


Patient Instructions:  Ibuprofen, Potassium Chloride, Low Back Pain  (DC), 

MANAGING YOUR CHRONIC PAIN, Hypokalemia (DC), Chronic Pain





Add. Discharge Instructions:  


Patient with low back pain and muscle spasms. He has arthritis on imaging. 

Toradol 60 mg IM given patient needs to hydrate. He will continue on Flexeril 10

mg 3 times a day. Patient also will continue on ibuprofen 600 mg 3 times a day 

with increased hydration. He is recommended to take potassium chloride 20 mEq 

twice a day for a week follow-up with his provider at the [facility]. Patient also wants Flexeril 10 mg 3 times a day. Patient understands he 

should not mix alcohol and he should stop using chew tobacco was is causing 

significant periodontal disease





All discharge instructions reviewed with patient and/or family. Voiced 

understanding.


Scripts


Ibuprofen (Ibuprofen) 600 Mg Tablet


600 MG PO Q6H PRN for PAIN-MILD for 10 Days, #30 TAB


   Prov: HODA BOB DO         3/25/20 


Cyclobenzaprine HCl (Cyclobenzaprine HCl) 10 Mg Tablet


10 MG PO TID PRN for SPASMS for 10 Days, #30 TAB


   Prov: HODA BOB DO         3/25/20


Work/School Note:  Work Release Form   Date Seen in the Emergency Department:  

Mar 25, 2020


   Return to Work:  Mar 25, 2020


      Other Restrictions Listed Below:  needs to stretch low back


Patient needs to stretch his low back continue Motrin 600 3 times a day as 

needed Flexeril 10 mg 3 times a day as needed K-Dur 20 milliequivalents twice a 

day as needed follow-up with community care clinic patient has received Toradol 

60 mg IM. Patient encouraged to stop using chewed tobacco for his periodontal 

disease.











HODA BOB DO            Mar 25, 2020 12:33

## 2020-03-25 NOTE — DIAGNOSTIC IMAGING REPORT
Examination: Lumbar spine, 3 views



INDICATION: Low back pain and bilateral pelvic/hip pain.



COMPARISON: None available.



FINDINGS:

There are 5 non-rib-bearing lumbar-type vertebral bodies. There

is mild dextroscoliosis of the lumbar spine. There is no fracture

or acute osseous abnormality. There is mild anterior height loss

of the T12 vertebral body, which is likely developmental in

nature. Vertebral body heights are maintained. There is mild

degenerative change involving the lumbar spine, with anterior

osteophyte formation and facet arthropathy demonstrated at

multiple levels. Intervertebral disc spaces are fairly

well-preserved. There is no evidence of subluxation. The SI

joints are unremarkable. Surgical clips are demonstrated in the

right upper abdominal quadrant. Regional soft tissues are

otherwise unremarkable.



IMPRESSION:

Degenerative changes of the lumbar spine, as detailed above,

without evidence of acute fracture or subluxation.



Dictated by: 



  Dictated on workstation # [id number]

## 2020-03-25 NOTE — XMS REPORT
Continuity of Care Document

                             Created on: 2020



Nevin Chacon

External Reference #: [id number]

: 1955

Sex: Male



Demographics





                          Address                   [address] [place] [address]  [id number]

 

                          Home Phone                [phone] x

 

                          Preferred Language        Unknown

 

                          Marital Status            Unknown

 

                          Rastafarian Affiliation     Unknown

 

                          Race                      Unknown

 

                          Ethnic Group              Unknown





Author





                          Organization              Unknown

 

                          Address                   Unknown

 

                          Phone                     Unavailable



              



Allergies

      



             Active           Description           Code           Type         

  Severity   

                Reaction           Onset           Reported/Identified          

 

Relationship to Patient                 Clinical Status        

 

                Yes             No Known Drug Allergies           [id number]    

       Drug 

Allergy           Unknown           N/A                             2019  

      

                                                             



                  



Medications

      



There is no data.                  



Problems

      



             Date Dx Coded           Attending           Type           Code    

       

Diagnosis                               Diagnosed By        

 

                2019           NEVIN VELOZ MD           Ot           

   M25.571         

                          PAIN IN RIGHT ANKLE AND JOINTS OF RIGHT               

      

 

                2019           NEVIN VELOZ MD           Ot           

   S93.401A        

                          SPRAIN OF UNSPECIFIED LIGAMENT OF RIGHT               

      

 

                2019           NEVIN VELOZ MD           Ot           

   W17.89XA        

                          OTHER FALL FROM ONE LEVEL TO ANOTHER, IN              

      

 

                2019           NEVIN VELOZ MD           Ot           

   Y93.39          

                          ACTIVITY, OTH INVOLVING CLIMBING, RAPPEL              

      

 

                2019           NEVIN VELOZ MD           Ot           

   M25.571         

                          PAIN IN RIGHT ANKLE AND JOINTS OF RIGHT               

      

 

                2019           NEVIN VELOZ MD           Ot           

   S93.401A        

                          SPRAIN OF UNSPECIFIED LIGAMENT OF RIGHT               

      

 

                2019           NEVIN VELOZ MD           Ot           

   W17.89XA        

                          OTHER FALL FROM ONE LEVEL TO ANOTHER, IN              

      

 

                2019           NEVIN VELOZ MD           Ot           

   Y93.39          

                          ACTIVITY, OTH INVOLVING CLIMBING, RAPPEL              

      

 

             2019           JARRET WOODS MD           Ot           I10 

          

ESSENTIAL (PRIMARY) HYPERTENSION                    

 

             2019           JARRET WOODS MD           Ot           I95.

1           

ORTHOSTATIC HYPOTENSION                          

 

             2019           JARRET WOODS MD           Ot           R42 

          

DIZZINESS AND GIDDINESS                          

 

             2019           JARRET WOODS MD           Ot           I10 

          

ESSENTIAL (PRIMARY) HYPERTENSION                    

 

             2019           JARRET WOODS MD           Ot           I95.

1           

ORTHOSTATIC HYPOTENSION                          

 

             2019           JARRET WOODS MD           Ot           R42 

          

DIZZINESS AND GIDDINESS                          

 

             2019           JARRET WOODS MD           Ot           I10 

          

ESSENTIAL (PRIMARY) HYPERTENSION                    

 

             2019           PEGGY MD, JARRET S           Ot           I95.

1           

ORTHOSTATIC HYPOTENSION                          

 

             2019           PEGGY LOPEZ, JARRET COBB           Ot           R42 

          

DIZZINESS AND GIDDINESS                          



                                                  



Procedures

      



There is no data.                  



Results

      



                    Test                Result              Range        

 

                                        Complete blood count (CBC) with automate

d white blood cell (WBC) differential - 

19 09:38         

 

                          Blood leukocytes automated count (number/volume)      

     11.9 10*3/uL         

                                        4.3-11.0        

 

                          Blood erythrocytes automated count (number/volume)    

       4.85 10*6/uL       

                                        4.35-5.85        

 

                    Venous blood hemoglobin measurement (mass/volume)           

14.6 g/dL           

13.3-17.7        

 

                    Blood hematocrit (volume fraction)           44 %           

     40-54        

 

                    Automated erythrocyte mean corpuscular volume           91 [

foz_us]           

80-99        

 

                                        Automated erythrocyte mean corpuscular h

emoglobin (mass per erythrocyte)        

                          30 pg                     25-34        

 

                                        Automated erythrocyte mean corpuscular h

emoglobin concentration measurement 

(mass/volume)             33 g/dL                   32-36        

 

                    Automated erythrocyte distribution width ratio           15.

8 %              10.0-

14.5        

 

                    Automated blood platelet count (count/volume)           275 

10*3/uL           

130-400        

 

                          Automated blood platelet mean volume measurement      

     10.8 [foz_us]        

                                        7.4-10.4        

 

                    Automated blood neutrophils/100 leukocytes           75 %   

             42-75       

 

 

                    Automated blood lymphocytes/100 leukocytes           15 %   

             12-44       

 

 

                    Blood monocytes/100 leukocytes           7 %                

 0-12        

 

                    Automated blood eosinophils/100 leukocytes           2 %    

             0-10        

 

                    Automated blood basophils/100 leukocytes           1 %      

           0-10        

 

                    Blood neutrophils automated count (number/volume)           

8.9 10*3            

1.8-7.8        

 

                    Blood lymphocytes automated count (number/volume)           

1.8 10*3            

1.0-4.0        

 

                    Blood monocytes automated count (number/volume)           0.

8 10*3            

0.0-1.0        

 

                    Automated eosinophil count           0.2 10*3/uL           0

.0-0.3        

 

                    Automated blood basophil count (count/volume)           0.1 

10*3/uL           

0.0-0.1        

 

                                        Comprehensive metabolic panel - 19

 09:38         

 

                          Serum or plasma sodium measurement (moles/volume)     

      138 mmol/L          

                                        135-145        

 

                          Serum or plasma potassium measurement (moles/volume)  

         3.1 mmol/L       

                                        3.6-5.0        

 

                          Serum or plasma chloride measurement (moles/volume)   

        95 mmol/L         

                                                

 

                    Carbon dioxide           24 mmol/L           21-32        

 

                          Serum or plasma anion gap determination (moles/volume)

           19 mmol/L      

                                        5-14        

 

                          Serum or plasma urea nitrogen measurement (mass/volume

)           15 mg/dL      

                                        7-18        

 

                          Serum or plasma creatinine measurement (mass/volume)  

         0.91 mg/dL       

                                        0.60-1.30        

 

                    Serum or plasma urea nitrogen/creatinine mass ratio         

  16                  NRG 

       

 

                                        Serum or plasma creatinine measurement w

ith calculation of estimated glomerular 

filtration rate           >                         NRG        

 

                    Serum or plasma glucose measurement (mass/volume)           

82 mg/dL            

        

 

                    Serum or plasma calcium measurement (mass/volume)           

9.8 mg/dL           

8.5-10.1        

 

                          Serum or plasma total bilirubin measurement (mass/volu

me)           0.5 mg/dL   

                                        0.1-1.0        

 

                                        Serum or plasma alkaline phosphatase nelson

surement (enzymatic activity/volume)    

                          51 U/L                            

 

                                        Serum or plasma aspartate aminotransfera

se measurement (enzymatic 

activity/volume)           36 U/L                    5-34        

 

                                        Serum or plasma alanine aminotransferase

 measurement (enzymatic activity/volume)

                          18 U/L                    0-55        

 

                    Serum or plasma protein measurement (mass/volume)           

7.9 g/dL            

6.4-8.2        

 

                    Serum or plasma albumin measurement (mass/volume)           

3.6 g/dL            

3.2-4.5        

 

                    CALCIUM CORRECTED           10.1 mg/dL           8.5-10.1   

     

 

                                        Serum or plasma troponin i.cardiac measu

rement (mass/volume) - 19 09:38   

      

 

                          Serum or plasma troponin i.cardiac measurement (mass/v

olume)           < ng/mL  

                                        <0.30        

 

                                        Complete urinalysis with reflex to cultu

re - 19 10:06         

 

                    Urine color determination           YELLOW              NRG 

       

 

                    Urine clarity determination           CLEAR               NR

G        

 

                    Urine pH measurement by test strip           5.5            

     5-9        

 

                    Specific gravity of urine by test strip           1.025     

          1.016-1.022  

      

 

                    Urine protein assay by test strip, semi-quantitative        

   TRACE               

NEGATIVE        

 

                    Urine glucose detection by automated test strip           NE

GATIVE            

NEGATIVE        

 

                          Erythrocytes detection in urine sediment by light micr

oscopy           NEGATIVE 

                                        NEGATIVE        

 

                    Urine ketones detection by automated test strip           3+

                  NEGATIVE

        

 

                    Urine nitrite detection by test strip           NEGATIVE    

        NEGATIVE    

    

 

                    Urine total bilirubin detection by test strip           2+  

                NEGATIVE  

      

 

                          Urine urobilinogen measurement by automated test strip

 (mass/volume)           

NORMAL                                  NORMAL        

 

                    Urine leukocyte esterase detection by dipstick           NEG

ATIVE            

NEGATIVE        

 

                                        Automated urine sediment erythrocyte cou

nt by microscopy (number/high power 

field)                    NONE                      NRG        

 

                                        Automated urine sediment leukocyte count

 by microscopy (number/high power field)

                          NONE                      NRG        

 

                          Bacteria detection in urine sediment by light microsco

py           NEGATIVE     

                                        NRG        

 

                                        Squamous epithelial cells detection in u

rine sediment by light microscopy       

                          25-50                     NRG        

 

                          Crystals detection in urine sediment by light microsco

py           NONE         

                                        NRG        

 

                          Casts detection in urine sediment by light microscopy 

          PRESENT         

                                        NRG        

 

                          Mucus detection in urine sediment by light microscopy 

          SMALL           

                                        NRG        

 

                    Complete urinalysis with reflex to culture           NO     

             NRG        

 

                          Hyaline casts detection in urine sediment by light apurva

roscopy           2-5     

                                        NRG        

 

                                        CMP - 20 16:08         

 

                    GLUCOSE             90 mg/dL            65-99        

 

                    UREA NITROGEN (BUN)           6 mg/dL             7-25      

  

 

                    CREATININE           0.75 mg/dL           0.70-1.25        

 

                    eGFR NON-AFR. AMERICAN           97 mL/min/1.73m2           

> OR = 60        

 

                    eGFR            112 mL/min/1.73m2           

> OR = 60        

 

                    BUN/CREATININE RATIO           8 (calc)            6-22     

   

 

                    SODIUM              142 mmol/L           135-146        

 

                    POTASSIUM           2.8 mmol/L           3.5-5.3        

 

                    CHLORIDE            97 mmol/L                   

 

                    CARBON DIOXIDE           35 mmol/L           20-32        

 

                    CALCIUM             8.9 mg/dL           8.6-10.3        

 

                    PROTEIN, TOTAL           7.0 g/dL            6.1-8.1        

 

                    ALBUMIN             3.8 g/dL            3.6-5.1        

 

                    GLOBULIN            3.2 g/dL (calc)           1.9-3.7       

 

 

                    ALBUMIN/GLOBULIN RATIO           1.2 (calc)           1.0-2.

5        

 

                    BILIRUBIN, TOTAL           0.8 mg/dL           0.2-1.2      

  

 

                    ALKALINE PHOSPHATASE           57 U/L                 

     

 

                    AST                 17 U/L              10-35        

 

                    ALT                 7 U/L               9-46        

 

                                        CBC - 20 16:08         

 

                    WHITE BLOOD CELL COUNT           11.5 Thousand/uL           

3.8-10.8        

 

                    RED BLOOD CELL COUNT           4.25 Million/uL           4.2

0-5.80        

 

                    HEMOGLOBIN           14.1 g/dL           13.2-17.1        

 

                    HEMATOCRIT           40.8 %              38.5-50.0        

 

                    MCV                 96.0 fL             80.0-100.0        

 

                    MCH                 33.2 pg             27.0-33.0        

 

                    MCHC                34.6 g/dL           32.0-36.0        

 

                    RDW                 16.0 %              11.0-15.0        

 

                    PLATELET COUNT           265 Thousand/uL           140-400  

      

 

                    MPV                 10.6 fL             7.5-12.5        

 

                    ABSOLUTE NEUTROPHILS           8568 cells/uL           1500-

7800        

 

                    ABSOLUTE LYMPHOCYTES           2105 cells/uL           850-3

900        

 

                    ABSOLUTE MONOCYTES           679 cells/uL           200-950 

       

 

                    ABSOLUTE EOSINOPHILS           115 cells/uL           

        

 

                    ABSOLUTE BASOPHILS           35 cells/uL           0-200    

    

 

                    NEUTROPHILS           74.5 %              NRG        

 

                    LYMPHOCYTES           18.3 %              NRG        

 

                    MONOCYTES           5.9 %               NRG        

 

                    EOSINOPHILS           1.0 %               NRG        

 

                    BASOPHILS           0.3 %               NRG        

 

                                        TSH - 20 16:08         

 

                    TSH                 3.15 mIU/L           0.40-4.50        

 

                                        CULTURE, URINE - 20 16:08         

 

                    CULTURE, URINE, ROUTINE           SEE NOTE            NRG   

     

 

                                        A1C - 20 16:08         

 

                    HEMOGLOBIN A1c           4.7 % of total Hgb           <5.7  

      

 

                                        FOLATE (FOLIC ACID) - 20 16:08    

     

 

                    FOLATE, SERUM           3.5 ng/mL           NRG        



                                  



Encounters

      



                ACCT No.           Visit Date/Time           Discharge          

 Status         

             Pt. Type           Provider           Facility           Loc./Unit 

          

Complaint        

 

                [id number]           2020 15:20:00           2020 23:59:

59           [facility]

                Outpatient           KIARA RICO                           [facility]                                       

 

             [id number]           2020 15:20:00                              

       Document

 Registration                                                                   

 

 

                    [id number]           2019 09:21:00           

019 11:05:00        

                DIS             Emergency           PEGGY LOPEZ, JARRET COBB          

 [facility]           ER FS                     MEDS CAUSING PT TO BE L

IGHTHEADED

        

 

                    [id number]           2019 07:43:00           

019 08:39:00        

                DIS             Emergency           ROSELIA LOPEZ, NEVIN RAMESH         

  [facility]           ER FS                     RT ANKLE PAIN

## 2020-03-26 ENCOUNTER — HOSPITAL ENCOUNTER (INPATIENT)
Dept: HOSPITAL 75 - 4TH | Age: 65
LOS: 6 days | Discharge: NURSING, REHAB OR LONG-TERM CARE | DRG: 208 | End: 2020-04-01
Attending: INTERNAL MEDICINE | Admitting: FAMILY MEDICINE
Payer: COMMERCIAL

## 2020-03-26 VITALS — SYSTOLIC BLOOD PRESSURE: 153 MMHG | DIASTOLIC BLOOD PRESSURE: 87 MMHG

## 2020-03-26 VITALS — SYSTOLIC BLOOD PRESSURE: 134 MMHG | DIASTOLIC BLOOD PRESSURE: 75 MMHG

## 2020-03-26 VITALS — SYSTOLIC BLOOD PRESSURE: 146 MMHG | DIASTOLIC BLOOD PRESSURE: 89 MMHG

## 2020-03-26 VITALS — HEIGHT: 67.01 IN | WEIGHT: 311.73 LBS | BODY MASS INDEX: 48.93 KG/M2

## 2020-03-26 VITALS — DIASTOLIC BLOOD PRESSURE: 80 MMHG | SYSTOLIC BLOOD PRESSURE: 146 MMHG

## 2020-03-26 DIAGNOSIS — E66.01: ICD-10-CM

## 2020-03-26 DIAGNOSIS — I10: ICD-10-CM

## 2020-03-26 DIAGNOSIS — Z91.19: ICD-10-CM

## 2020-03-26 DIAGNOSIS — R10.32: ICD-10-CM

## 2020-03-26 DIAGNOSIS — J96.01: ICD-10-CM

## 2020-03-26 DIAGNOSIS — J18.9: Primary | ICD-10-CM

## 2020-03-26 DIAGNOSIS — I95.9: ICD-10-CM

## 2020-03-26 DIAGNOSIS — M54.16: ICD-10-CM

## 2020-03-26 DIAGNOSIS — R93.0: ICD-10-CM

## 2020-03-26 DIAGNOSIS — F41.9: ICD-10-CM

## 2020-03-26 DIAGNOSIS — R63.4: ICD-10-CM

## 2020-03-26 DIAGNOSIS — E87.6: ICD-10-CM

## 2020-03-26 DIAGNOSIS — G72.81: ICD-10-CM

## 2020-03-26 DIAGNOSIS — E86.0: ICD-10-CM

## 2020-03-26 DIAGNOSIS — F29: ICD-10-CM

## 2020-03-26 DIAGNOSIS — G93.41: ICD-10-CM

## 2020-03-26 DIAGNOSIS — D72.829: ICD-10-CM

## 2020-03-26 DIAGNOSIS — M19.91: ICD-10-CM

## 2020-03-26 DIAGNOSIS — F17.220: ICD-10-CM

## 2020-03-26 DIAGNOSIS — E87.0: ICD-10-CM

## 2020-03-26 DIAGNOSIS — F10.231: ICD-10-CM

## 2020-03-26 LAB
ALBUMIN SERPL-MCNC: 3.3 GM/DL (ref 3.2–4.5)
ALP SERPL-CCNC: 56 U/L (ref 40–136)
ALT SERPL-CCNC: 10 U/L (ref 0–55)
AMYLASE SERPL-CCNC: 25 U/L (ref 25–125)
ANISOCYTOSIS BLD QL SMEAR: SLIGHT
APTT BLD: 33 SEC (ref 24–35)
BASOPHILS # BLD AUTO: 0 10^3/UL (ref 0–0.1)
BASOPHILS NFR BLD AUTO: 0 % (ref 0–10)
BILIRUB SERPL-MCNC: 1.1 MG/DL (ref 0.1–1)
BUN/CREAT SERPL: 11
CALCIUM SERPL-MCNC: 8.6 MG/DL (ref 8.5–10.1)
CHLORIDE SERPL-SCNC: 97 MMOL/L (ref 98–107)
CO2 SERPL-SCNC: 32 MMOL/L (ref 21–32)
CREAT SERPL-MCNC: 0.84 MG/DL (ref 0.6–1.3)
EOSINOPHIL # BLD AUTO: 0.1 10^3/UL (ref 0–0.3)
EOSINOPHIL NFR BLD AUTO: 1 % (ref 0–10)
EOSINOPHIL NFR BLD MANUAL: 1 %
ERYTHROCYTE [DISTWIDTH] IN BLOOD BY AUTOMATED COUNT: 16.3 % (ref 10–14.5)
ERYTHROCYTE [SEDIMENTATION RATE] IN BLOOD: 58 MM/HR (ref 0–30)
GFR SERPLBLD BASED ON 1.73 SQ M-ARVRAT: > 60 ML/MIN
GLUCOSE SERPL-MCNC: 84 MG/DL (ref 70–105)
HCT VFR BLD CALC: 37 % (ref 40–54)
HGB BLD-MCNC: 12.5 G/DL (ref 13.3–17.7)
INR PPP: 1.2 (ref 0.8–1.4)
LIPASE SERPL-CCNC: < 4 U/L (ref 8–78)
LYMPHOCYTES # BLD AUTO: 1.2 X 10^3 (ref 1–4)
LYMPHOCYTES NFR BLD AUTO: 8 % (ref 12–44)
MANUAL DIFFERENTIAL PERFORMED BLD QL: YES
MCH RBC QN AUTO: 34 PG (ref 25–34)
MCHC RBC AUTO-ENTMCNC: 34 G/DL (ref 32–36)
MCV RBC AUTO: 100 FL (ref 80–99)
MONOCYTES # BLD AUTO: 1.1 X 10^3 (ref 0–1)
MONOCYTES NFR BLD AUTO: 8 % (ref 0–12)
MONOCYTES NFR BLD: 8 %
NEUTROPHILS # BLD AUTO: 12.2 X 10^3 (ref 1.8–7.8)
NEUTROPHILS NFR BLD AUTO: 83 % (ref 42–75)
NEUTS BAND NFR BLD MANUAL: 83 %
PLATELET # BLD: 136 10^3/UL (ref 130–400)
PMV BLD AUTO: 10.9 FL (ref 7.4–10.4)
POTASSIUM SERPL-SCNC: 2.6 MMOL/L (ref 3.6–5)
PROT SERPL-MCNC: 6.9 GM/DL (ref 6.4–8.2)
PROTHROMBIN TIME: 15.2 SEC (ref 12.2–14.7)
SODIUM SERPL-SCNC: 142 MMOL/L (ref 135–145)
VARIANT LYMPHS NFR BLD MANUAL: 8 %
WBC # BLD AUTO: 14.6 10^3/UL (ref 4.3–11)

## 2020-03-26 PROCEDURE — 83880 ASSAY OF NATRIURETIC PEPTIDE: CPT

## 2020-03-26 PROCEDURE — 83916 OLIGOCLONAL BANDS: CPT

## 2020-03-26 PROCEDURE — 85730 THROMBOPLASTIN TIME PARTIAL: CPT

## 2020-03-26 PROCEDURE — 36415 COLL VENOUS BLD VENIPUNCTURE: CPT

## 2020-03-26 PROCEDURE — 85025 COMPLETE CBC W/AUTO DIFF WBC: CPT

## 2020-03-26 PROCEDURE — 80048 BASIC METABOLIC PNL TOTAL CA: CPT

## 2020-03-26 PROCEDURE — 82805 BLOOD GASES W/O2 SATURATION: CPT

## 2020-03-26 PROCEDURE — 84100 ASSAY OF PHOSPHORUS: CPT

## 2020-03-26 PROCEDURE — 93005 ELECTROCARDIOGRAM TRACING: CPT

## 2020-03-26 PROCEDURE — 87040 BLOOD CULTURE FOR BACTERIA: CPT

## 2020-03-26 PROCEDURE — 83735 ASSAY OF MAGNESIUM: CPT

## 2020-03-26 PROCEDURE — 87070 CULTURE OTHR SPECIMN AEROBIC: CPT

## 2020-03-26 PROCEDURE — 94003 VENT MGMT INPAT SUBQ DAY: CPT

## 2020-03-26 PROCEDURE — 84484 ASSAY OF TROPONIN QUANT: CPT

## 2020-03-26 PROCEDURE — 74178 CT ABD&PLV WO CNTR FLWD CNTR: CPT

## 2020-03-26 PROCEDURE — 85007 BL SMEAR W/DIFF WBC COUNT: CPT

## 2020-03-26 PROCEDURE — 94799 UNLISTED PULMONARY SVC/PX: CPT

## 2020-03-26 PROCEDURE — 84478 ASSAY OF TRIGLYCERIDES: CPT

## 2020-03-26 PROCEDURE — 83036 HEMOGLOBIN GLYCOSYLATED A1C: CPT

## 2020-03-26 PROCEDURE — 83605 ASSAY OF LACTIC ACID: CPT

## 2020-03-26 PROCEDURE — 94002 VENT MGMT INPAT INIT DAY: CPT

## 2020-03-26 PROCEDURE — 84145 PROCALCITONIN (PCT): CPT

## 2020-03-26 PROCEDURE — 82150 ASSAY OF AMYLASE: CPT

## 2020-03-26 PROCEDURE — 85027 COMPLETE CBC AUTOMATED: CPT

## 2020-03-26 PROCEDURE — 84157 ASSAY OF PROTEIN OTHER: CPT

## 2020-03-26 PROCEDURE — 82962 GLUCOSE BLOOD TEST: CPT

## 2020-03-26 PROCEDURE — 87205 SMEAR GRAM STAIN: CPT

## 2020-03-26 PROCEDURE — 85610 PROTHROMBIN TIME: CPT

## 2020-03-26 PROCEDURE — 84132 ASSAY OF SERUM POTASSIUM: CPT

## 2020-03-26 PROCEDURE — 82945 GLUCOSE OTHER FLUID: CPT

## 2020-03-26 PROCEDURE — 85652 RBC SED RATE AUTOMATED: CPT

## 2020-03-26 PROCEDURE — 82140 ASSAY OF AMMONIA: CPT

## 2020-03-26 PROCEDURE — 71260 CT THORAX DX C+: CPT

## 2020-03-26 PROCEDURE — 94640 AIRWAY INHALATION TREATMENT: CPT

## 2020-03-26 PROCEDURE — 87081 CULTURE SCREEN ONLY: CPT

## 2020-03-26 PROCEDURE — 83690 ASSAY OF LIPASE: CPT

## 2020-03-26 PROCEDURE — 89051 BODY FLUID CELL COUNT: CPT

## 2020-03-26 PROCEDURE — 86695 HERPES SIMPLEX TYPE 1 TEST: CPT

## 2020-03-26 PROCEDURE — 71045 X-RAY EXAM CHEST 1 VIEW: CPT

## 2020-03-26 PROCEDURE — 70470 CT HEAD/BRAIN W/O & W/DYE: CPT

## 2020-03-26 PROCEDURE — 82607 VITAMIN B-12: CPT

## 2020-03-26 PROCEDURE — 80053 COMPREHEN METABOLIC PANEL: CPT

## 2020-03-26 PROCEDURE — 84443 ASSAY THYROID STIM HORMONE: CPT

## 2020-03-26 PROCEDURE — 86696 HERPES SIMPLEX TYPE 2 TEST: CPT

## 2020-03-26 RX ADMIN — SODIUM CHLORIDE SCH MLS/HR: 900 INJECTION, SOLUTION INTRAVENOUS at 15:32

## 2020-03-26 RX ADMIN — POTASSIUM CHLORIDE SCH MLS/HR: 200 INJECTION, SOLUTION INTRAVENOUS at 23:11

## 2020-03-26 RX ADMIN — SODIUM CHLORIDE SCH MLS/HR: 900 INJECTION, SOLUTION INTRAVENOUS at 15:27

## 2020-03-26 RX ADMIN — SODIUM CHLORIDE SCH MLS/HR: 900 INJECTION, SOLUTION INTRAVENOUS at 21:50

## 2020-03-26 RX ADMIN — ENOXAPARIN SODIUM SCH MG: 100 INJECTION SUBCUTANEOUS at 17:52

## 2020-03-26 RX ADMIN — DOCUSATE SODIUM AND SENNOSIDES SCH EA: 8.6; 5 TABLET, FILM COATED ORAL at 21:49

## 2020-03-26 RX ADMIN — MAGNESIUM SULFATE IN DEXTROSE SCH MLS/HR: 10 INJECTION, SOLUTION INTRAVENOUS at 20:36

## 2020-03-26 RX ADMIN — MAGNESIUM SULFATE IN DEXTROSE SCH MLS/HR: 10 INJECTION, SOLUTION INTRAVENOUS at 21:50

## 2020-03-26 RX ADMIN — POTASSIUM CHLORIDE SCH MLS/HR: 200 INJECTION, SOLUTION INTRAVENOUS at 23:59

## 2020-03-26 RX ADMIN — POTASSIUM CHLORIDE SCH MLS/HR: 200 INJECTION, SOLUTION INTRAVENOUS at 22:00

## 2020-03-26 NOTE — DIAGNOSTIC IMAGING REPORT
PROCEDURE: CT chest with contrast, CT abdomen and pelvis with and

without contrast.



TECHNIQUE: Pre and post intravenous contrast axial imaging of the

abdomen and pelvis and post contrast axial imaging of the chest

were performed. Auto Exposure Controls were utilized during the

CT exam to meet ALARA standards for radiation dose reduction.



FINDINGS: It is noted that this examination has been modified as

initially contrast was administered just prior to this study for

CT imaging of the head.



INDICATION: Left-sided abdominal pain, 80 pound weight loss,

confusion.



CORRELATION STUDY: None.



FINDINGS:



CT CHEST: Heart size is mildly enlarged. No significant

pericardial effusion. Scattered coronary artery calcification.

Thoracic aorta unremarkable in its contour. No definitive

pathologic enlargement mediastinal, hilar and/or axillary lymph

nodes. Partially visualized portion of thyroid gland

unremarkable.



Lung fields demonstrate no significant consolidating infiltrate.

No abnormal concerning pulmonary mass. No pleural effusion.



CT ABDOMEN and PELVIS: Liver, spleen, mildly atrophic pancreas

and adrenal glands appear unremarkable. Cholecystectomy changes

without bile ductal dilatation. Kidneys are generally

unremarkable in appearance. Abdominal aorta is normal in contour.



Gastrointestinal tract demonstrates mild severity fecal

retention. No definitive obstruction or inflammatory change. A

few mildly prominent fluid-filled loops of bowel without findings

to suggest obstruction. Questioned potential metallic density in

a small bowel loop low in the midline pelvis. No abdominal

ascites or free air.



Urinary bladder unremarkable. Prostate gland is enlarged.



Mild rightward curvature of the lumbar spine. There does appear

to be foraminal narrowing at the L4-L5 and L5-S1 levels.





IMPRESSION:



CT CHEST:

1. Negative for acute abnormality of the chest.



CT ABDOMEN and PELVIS:

1. No acute findings suggested about the abdomen and/or pelvis. 



Dictated by: 



  Dictated on workstation # [id number]

## 2020-03-26 NOTE — DIAGNOSTIC IMAGING REPORT
PROCEDURE: CT head with and without contrast.



TECHNIQUE: Multiple contiguous axial images were obtained through

the brain before and after the administration of intravenous

contrast. Auto Exposure Controls were utilized during the CT exam

to meet ALARA standards for radiation dose reduction. 



INDICATION: Confusion. 80 pound weight loss.



COMPARISON: No comparison of the head is available.



FINDINGS: There is abnormal low density demonstrated within the

right anterior temporal lobe. The postcontrast images demonstrate

no evidence of an adjacent mass or associated enhancement. There

does appear to be some associated volume loss which suggests that

this is likely chronic encephalomalacia. MRI evaluation would

however be useful if no clinical contraindication



Additionally, there appear to be moderate chronic microvascular

changes within the deep white matter. There is no territorial

loss of gray-white differentiation. There are no findings to

suggest vasogenic edema.



There are no findings of hemorrhage, mass effect or

hydrocephalus.



The mastoid air cells are clear. The paranasal sinuses are clear.

Orbital contents are unremarkable.



On the postcontrast portion of the examination there are no

findings of pathologic intracranial enhancement.



IMPRESSION:

1. Large region of abnormal low density within the anterior right

temporal lobe. There is no evidence of associated enhancement.

There does appear to be associated volume loss which would

suggest that this is a chronic region of encephalomalacia related

to either prior infarct or trauma. This would, however, be better

assessed with MRI if the patient has no clinical

contraindication.

2. Within the deep white matter there are regions of

hypoattenuation that are predominantly near the atria of the

ventricles that are most suggestive of chronic microvascular

disease but remain nonspecific.

3. No findings to suggest vasogenic edema. There is no mass

effect or pathologic intracranial enhancement.

4. No evidence of hemorrhage, extra-axial collection or

hydrocephalus. 



Dictated by: 



  Dictated on workstation # [id number]

## 2020-03-26 NOTE — NUR
Sánchez Chacon admitted to room 408, with an admitting diagnosis of pneumonia, on  from  via , 
accompanied by .SÁNCHEZ CHACON introduced to surroundings, call light, bed controls, phone, 
TV, temperature control, lights, meal times, smoking policy, visitor policy, side rail 
policy, bathrooms and showers.  Patient Rights given to patient in the handbook.SÁNCHEZ CHACON verbalizes understanding that [facility] is not responsible for the loss or damage to any 
personal effects or valuables that are kept in the patients posession during their 
hospitalization.SÁNCHEZ CHACON verbalizes understanding of Interdisciplinary Patient 
Education. Patient and/or family were informed about the Rapid Response Team and its 
purpose.

## 2020-03-26 NOTE — DIAGNOSTIC IMAGING REPORT
EXAM: PA chest.



INDICATION: Confusion. 80 pound weight loss.



FINDINGS: The lungs appear clear without focal infiltrate or

consolidation. There is no effusion. There is no pneumothorax.

Heart size and mediastinal contours appear appropriate and

pulmonary vascularity appears normal. No acute osseous and amount

he is demonstrated. There are advanced arthritic changes present

at the left shoulder.



IMPRESSION:

1. No radiographic evidence of an acute cardiopulmonary process.



Dictated by: 



  Dictated on workstation # [id number]

## 2020-03-26 NOTE — XMS REPORT
Continuity of Care Document

                             Created on: 2020



Nevin Chacon

External Reference #: [id number]

: 1955

Sex: Male



Demographics





                          Address                   [address] [place] [address]  [address]

 

                          Home Phone                [phone] x

 

                          Preferred Language        Unknown

 

                          Marital Status            Unknown

 

                          Cheondoism Affiliation     Unknown

 

                          Race                      Unknown

 

                          Ethnic Group              Unknown





Author





                          Organization              Unknown

 

                          Address                   Unknown

 

                          Phone                     Unavailable



              



Allergies

      



             Active           Description           Code           Type         

  Severity   

                Reaction           Onset           Reported/Identified          

 

Relationship to Patient                 Clinical Status        

 

                Yes             No Known Drug Allergies           [id number]    

       Drug 

Allergy           Unknown           N/A                             2019  

      

                                                             



                  



Medications

      



There is no data.                  



Problems

      



             Date Dx Coded           Attending           Type           Code    

       

Diagnosis                               Diagnosed By        

 

                2019           NEVIN VELOZ MD           Ot           

   M25.571         

                          PAIN IN RIGHT ANKLE AND JOINTS OF RIGHT               

      

 

                2019           NEVIN VELOZ MD           Ot           

   S93.401A        

                          SPRAIN OF UNSPECIFIED LIGAMENT OF RIGHT               

      

 

                2019           NEVIN VELOZ MD           Ot           

   W17.89XA        

                          OTHER FALL FROM ONE LEVEL TO ANOTHER, IN              

      

 

                2019           NEVIN VELOZ MD           Ot           

   Y93.39          

                          ACTIVITY, OTH INVOLVING CLIMBING, RAPPEL              

      

 

                2019           NEVIN VELOZ MD           Ot           

   M25.571         

                          PAIN IN RIGHT ANKLE AND JOINTS OF RIGHT               

      

 

                2019           NEVIN VELOZ MD           Ot           

   S93.401A        

                          SPRAIN OF UNSPECIFIED LIGAMENT OF RIGHT               

      

 

                2019           NEVIN VELOZ MD           Ot           

   W17.89XA        

                          OTHER FALL FROM ONE LEVEL TO ANOTHER, IN              

      

 

                2019           NEVIN VELOZ MD           Ot           

   Y93.39          

                          ACTIVITY, OTH INVOLVING CLIMBING, RAPPEL              

      

 

             2019           JARRET WOODS MD           Ot           I10 

          

ESSENTIAL (PRIMARY) HYPERTENSION                    

 

             2019           JARRET WOODS MD           Ot           I95.

1           

ORTHOSTATIC HYPOTENSION                          

 

             2019           JARRET WOODS MD           Ot           R42 

          

DIZZINESS AND GIDDINESS                          

 

             2019           JARRET WOODS MD           Ot           I10 

          

ESSENTIAL (PRIMARY) HYPERTENSION                    

 

             2019           JARRET WOODS MD           Ot           I95.

1           

ORTHOSTATIC HYPOTENSION                          

 

             2019           JARRET WOODS MD           Ot           R42 

          

DIZZINESS AND GIDDINESS                          

 

             2019           JARRET WOODS MD           Ot           I10 

          

ESSENTIAL (PRIMARY) HYPERTENSION                    

 

             2019           PEGGY MD, JARRET S           Ot           I95.

1           

ORTHOSTATIC HYPOTENSION                          

 

             2019           PEGGY LOPEZ, JARRET COBB           Ot           R42 

          

DIZZINESS AND GIDDINESS                          



                                                  



Procedures

      



There is no data.                  



Results

      



                    Test                Result              Range        

 

                                        Complete blood count (CBC) with automate

d white blood cell (WBC) differential - 

19 09:38         

 

                          Blood leukocytes automated count (number/volume)      

     11.9 10*3/uL         

                                        4.3-11.0        

 

                          Blood erythrocytes automated count (number/volume)    

       4.85 10*6/uL       

                                        4.35-5.85        

 

                    Venous blood hemoglobin measurement (mass/volume)           

14.6 g/dL           

13.3-17.7        

 

                    Blood hematocrit (volume fraction)           44 %           

     40-54        

 

                    Automated erythrocyte mean corpuscular volume           91 [

foz_us]           

80-99        

 

                                        Automated erythrocyte mean corpuscular h

emoglobin (mass per erythrocyte)        

                          30 pg                     25-34        

 

                                        Automated erythrocyte mean corpuscular h

emoglobin concentration measurement 

(mass/volume)             33 g/dL                   32-36        

 

                    Automated erythrocyte distribution width ratio           15.

8 %              10.0-

14.5        

 

                    Automated blood platelet count (count/volume)           275 

10*3/uL           

130-400        

 

                          Automated blood platelet mean volume measurement      

     10.8 [foz_us]        

                                        7.4-10.4        

 

                    Automated blood neutrophils/100 leukocytes           75 %   

             42-75       

 

 

                    Automated blood lymphocytes/100 leukocytes           15 %   

             12-44       

 

 

                    Blood monocytes/100 leukocytes           7 %                

 0-12        

 

                    Automated blood eosinophils/100 leukocytes           2 %    

             0-10        

 

                    Automated blood basophils/100 leukocytes           1 %      

           0-10        

 

                    Blood neutrophils automated count (number/volume)           

8.9 10*3            

1.8-7.8        

 

                    Blood lymphocytes automated count (number/volume)           

1.8 10*3            

1.0-4.0        

 

                    Blood monocytes automated count (number/volume)           0.

8 10*3            

0.0-1.0        

 

                    Automated eosinophil count           0.2 10*3/uL           0

.0-0.3        

 

                    Automated blood basophil count (count/volume)           0.1 

10*3/uL           

0.0-0.1        

 

                                        Comprehensive metabolic panel - 19

 09:38         

 

                          Serum or plasma sodium measurement (moles/volume)     

      138 mmol/L          

                                        135-145        

 

                          Serum or plasma potassium measurement (moles/volume)  

         3.1 mmol/L       

                                        3.6-5.0        

 

                          Serum or plasma chloride measurement (moles/volume)   

        95 mmol/L         

                                                

 

                    Carbon dioxide           24 mmol/L           21-32        

 

                          Serum or plasma anion gap determination (moles/volume)

           19 mmol/L      

                                        5-14        

 

                          Serum or plasma urea nitrogen measurement (mass/volume

)           15 mg/dL      

                                        7-18        

 

                          Serum or plasma creatinine measurement (mass/volume)  

         0.91 mg/dL       

                                        0.60-1.30        

 

                    Serum or plasma urea nitrogen/creatinine mass ratio         

  16                  NRG 

       

 

                                        Serum or plasma creatinine measurement w

ith calculation of estimated glomerular 

filtration rate           >                         NRG        

 

                    Serum or plasma glucose measurement (mass/volume)           

82 mg/dL            

        

 

                    Serum or plasma calcium measurement (mass/volume)           

9.8 mg/dL           

8.5-10.1        

 

                          Serum or plasma total bilirubin measurement (mass/volu

me)           0.5 mg/dL   

                                        0.1-1.0        

 

                                        Serum or plasma alkaline phosphatase nelson

surement (enzymatic activity/volume)    

                          51 U/L                            

 

                                        Serum or plasma aspartate aminotransfera

se measurement (enzymatic 

activity/volume)           36 U/L                    5-34        

 

                                        Serum or plasma alanine aminotransferase

 measurement (enzymatic activity/volume)

                          18 U/L                    0-55        

 

                    Serum or plasma protein measurement (mass/volume)           

7.9 g/dL            

6.4-8.2        

 

                    Serum or plasma albumin measurement (mass/volume)           

3.6 g/dL            

3.2-4.5        

 

                    CALCIUM CORRECTED           10.1 mg/dL           8.5-10.1   

     

 

                                        Serum or plasma troponin i.cardiac measu

rement (mass/volume) - 19 09:38   

      

 

                          Serum or plasma troponin i.cardiac measurement (mass/v

olume)           < ng/mL  

                                        <0.30        

 

                                        Complete urinalysis with reflex to cultu

re - 19 10:06         

 

                    Urine color determination           YELLOW              NRG 

       

 

                    Urine clarity determination           CLEAR               NR

G        

 

                    Urine pH measurement by test strip           5.5            

     5-9        

 

                    Specific gravity of urine by test strip           1.025     

          1.016-1.022  

      

 

                    Urine protein assay by test strip, semi-quantitative        

   TRACE               

NEGATIVE        

 

                    Urine glucose detection by automated test strip           NE

GATIVE            

NEGATIVE        

 

                          Erythrocytes detection in urine sediment by light micr

oscopy           NEGATIVE 

                                        NEGATIVE        

 

                    Urine ketones detection by automated test strip           3+

                  NEGATIVE

        

 

                    Urine nitrite detection by test strip           NEGATIVE    

        NEGATIVE    

    

 

                    Urine total bilirubin detection by test strip           2+  

                NEGATIVE  

      

 

                          Urine urobilinogen measurement by automated test strip

 (mass/volume)           

NORMAL                                  NORMAL        

 

                    Urine leukocyte esterase detection by dipstick           NEG

ATIVE            

NEGATIVE        

 

                                        Automated urine sediment erythrocyte cou

nt by microscopy (number/high power 

field)                    NONE                      NRG        

 

                                        Automated urine sediment leukocyte count

 by microscopy (number/high power field)

                          NONE                      NRG        

 

                          Bacteria detection in urine sediment by light microsco

py           NEGATIVE     

                                        NRG        

 

                                        Squamous epithelial cells detection in u

rine sediment by light microscopy       

                          25-50                     NRG        

 

                          Crystals detection in urine sediment by light microsco

py           NONE         

                                        NRG        

 

                          Casts detection in urine sediment by light microscopy 

          PRESENT         

                                        NRG        

 

                          Mucus detection in urine sediment by light microscopy 

          SMALL           

                                        NRG        

 

                    Complete urinalysis with reflex to culture           NO     

             NRG        

 

                          Hyaline casts detection in urine sediment by light apurva

roscopy           2-5     

                                        NRG        

 

                                        CMP - 20 16:08         

 

                    GLUCOSE             90 mg/dL            65-99        

 

                    UREA NITROGEN (BUN)           6 mg/dL             7-25      

  

 

                    CREATININE           0.75 mg/dL           0.70-1.25        

 

                    eGFR NON-AFR. AMERICAN           97 mL/min/1.73m2           

> OR = 60        

 

                    eGFR            112 mL/min/1.73m2           

> OR = 60        

 

                    BUN/CREATININE RATIO           8 (calc)            6-22     

   

 

                    SODIUM              142 mmol/L           135-146        

 

                    POTASSIUM           2.8 mmol/L           3.5-5.3        

 

                    CHLORIDE            97 mmol/L                   

 

                    CARBON DIOXIDE           35 mmol/L           20-32        

 

                    CALCIUM             8.9 mg/dL           8.6-10.3        

 

                    PROTEIN, TOTAL           7.0 g/dL            6.1-8.1        

 

                    ALBUMIN             3.8 g/dL            3.6-5.1        

 

                    GLOBULIN            3.2 g/dL (calc)           1.9-3.7       

 

 

                    ALBUMIN/GLOBULIN RATIO           1.2 (calc)           1.0-2.

5        

 

                    BILIRUBIN, TOTAL           0.8 mg/dL           0.2-1.2      

  

 

                    ALKALINE PHOSPHATASE           57 U/L                 

     

 

                    AST                 17 U/L              10-35        

 

                    ALT                 7 U/L               9-46        

 

                                        CBC - 20 16:08         

 

                    WHITE BLOOD CELL COUNT           11.5 Thousand/uL           

3.8-10.8        

 

                    RED BLOOD CELL COUNT           4.25 Million/uL           4.2

0-5.80        

 

                    HEMOGLOBIN           14.1 g/dL           13.2-17.1        

 

                    HEMATOCRIT           40.8 %              38.5-50.0        

 

                    MCV                 96.0 fL             80.0-100.0        

 

                    MCH                 33.2 pg             27.0-33.0        

 

                    MCHC                34.6 g/dL           32.0-36.0        

 

                    RDW                 16.0 %              11.0-15.0        

 

                    PLATELET COUNT           265 Thousand/uL           140-400  

      

 

                    MPV                 10.6 fL             7.5-12.5        

 

                    ABSOLUTE NEUTROPHILS           8568 cells/uL           1500-

7800        

 

                    ABSOLUTE LYMPHOCYTES           2105 cells/uL           850-3

900        

 

                    ABSOLUTE MONOCYTES           679 cells/uL           200-950 

       

 

                    ABSOLUTE EOSINOPHILS           115 cells/uL           

        

 

                    ABSOLUTE BASOPHILS           35 cells/uL           0-200    

    

 

                    NEUTROPHILS           74.5 %              NRG        

 

                    LYMPHOCYTES           18.3 %              NRG        

 

                    MONOCYTES           5.9 %               NRG        

 

                    EOSINOPHILS           1.0 %               NRG        

 

                    BASOPHILS           0.3 %               NRG        

 

                                        TSH - 20 16:08         

 

                    TSH                 3.15 mIU/L           0.40-4.50        

 

                                        CULTURE, URINE - 20 16:08         

 

                    CULTURE, URINE, ROUTINE           SEE NOTE            NRG   

     

 

                                        A1C - 20 16:08         

 

                    HEMOGLOBIN A1c           4.7 % of total Hgb           <5.7  

      

 

                                        FOLATE (FOLIC ACID) - 20 16:08    

     

 

                    FOLATE, SERUM           3.5 ng/mL           NRG        

 

                                        Blood CBC with ordered manual differenti

al panel - 20 12:52         

 

                          Blood leukocytes automated count (number/volume)      

     14.1 10*3/uL         

                                        4.3-11.0        

 

                          Blood erythrocytes automated count (number/volume)    

       3.96 10*6/uL       

                                        4.35-5.85        

 

                    Venous blood hemoglobin measurement (mass/volume)           

13.4 g/dL           

13.3-17.7        

 

                    Blood hematocrit (volume fraction)           38 %           

     40-54        

 

                    Automated erythrocyte mean corpuscular volume           97 [

foz_us]           

80-99        

 

                                        Automated erythrocyte mean corpuscular h

emoglobin (mass per erythrocyte)        

                          34 pg                     25-34        

 

                                        Automated erythrocyte mean corpuscular h

emoglobin concentration measurement 

(mass/volume)             35 g/dL                   32-36        

 

                    Automated erythrocyte distribution width ratio           15.

9 %              10.0-

14.5        

 

                    Automated blood platelet count (count/volume)           198 

10*3/uL           

130-400        

 

                          Automated blood platelet mean volume measurement      

     10.6 [foz_us]        

                                        7.4-10.4        

 

                    Automated blood neutrophils/100 leukocytes           83 %   

             42-75       

 

 

                    Automated blood lymphocytes/100 leukocytes           10 %   

             12-44       

 

 

                    Blood monocytes/100 leukocytes           9 %                

 NRG        

 

                    Automated blood eosinophils/100 leukocytes           0 %    

             0-10        

 

                    Automated blood basophils/100 leukocytes           0 %      

           0-10        

 

                    Blood neutrophils automated count (number/volume)           

11.7 10*3           

1.8-7.8        

 

                    Blood lymphocytes automated count (number/volume)           

1.4 10*3            

1.0-4.0        

 

                    Blood monocytes automated count (number/volume)           0.

9 10*3            

0.0-1.0        

 

                    Automated eosinophil count           0.1 10*3/uL           0

.0-0.3        

 

                    Automated blood basophil count (count/volume)           0.0 

10*3/uL           

0.0-0.1        

 

                    Manual blood segmented neutrophils/100 leukocytes           

79 %                NRG  

      

 

                    Blood band neutrophils/100 leukocytes           2 %         

        NRG        

 

                    Manual blood lymphocytes/100 leukocytes           10 %      

          NRG        

 

                    Manual eosinophils/100 leukocytes in nose           0 %     

            NRG        

 

                    Manual blood basophils/100 leukocytes           0 %         

        NRG        

 

                    Blood anisocytosis detection by light microscopy           S

LIGHT              [facility]

        

 

                                        Comprehensive metabolic panel - 20

 12:52         

 

                          Serum or plasma sodium measurement (moles/volume)     

      140 mmol/L          

                                        135-145        

 

                          Serum or plasma potassium measurement (moles/volume)  

         2.6 mmol/L       

                                        3.6-5.0        

 

                          Serum or plasma chloride measurement (moles/volume)   

        92 mmol/L         

                                                

 

                    Carbon dioxide           37 mmol/L           21-32        

 

                          Serum or plasma anion gap determination (moles/volume)

           11 mmol/L      

                                        5-14        

 

                          Serum or plasma urea nitrogen measurement (mass/volume

)           7 mg/dL       

                                        7-18        

 

                          Serum or plasma creatinine measurement (mass/volume)  

         0.90 mg/dL       

                                        0.60-1.30        

 

                    Serum or plasma urea nitrogen/creatinine mass ratio         

  8                   NRG  

      

 

                                        Serum or plasma creatinine measurement w

ith calculation of estimated glomerular 

filtration rate           >                         NRG        

 

                    Serum or plasma glucose measurement (mass/volume)           

103 mg/dL           

        

 

                    Serum or plasma calcium measurement (mass/volume)           

9.2 mg/dL           

8.5-10.1        

 

                          Serum or plasma total bilirubin measurement (mass/volu

me)           1.0 mg/dL   

                                        0.1-1.0        

 

                                        Serum or plasma alkaline phosphatase nelson

surement (enzymatic activity/volume)    

                          61 U/L                            

 

                                        Serum or plasma aspartate aminotransfera

se measurement (enzymatic 

activity/volume)           16 U/L                    5-34        

 

                                        Serum or plasma alanine aminotransferase

 measurement (enzymatic activity/volume)

                          7 U/L                     0-55        

 

                    Serum or plasma protein measurement (mass/volume)           

7.4 g/dL            

6.4-8.2        

 

                    Serum or plasma albumin measurement (mass/volume)           

3.5 g/dL            

3.2-4.5        

 

                    CALCIUM CORRECTED           9.6 mg/dL           8.5-10.1    

    

 

                                        Complete urinalysis with reflex to cultu

re - 20 13:05         

 

                    Urine color determination           YELLOW              NRG 

       

 

                    Urine clarity determination           CLEAR               NR

G        

 

                    Urine pH measurement by test strip           8.0            

     5-9        

 

                    Specific gravity of urine by test strip           1.015     

          1.016-1.022  

      

 

                          Urine protein assay by test strip, semi-quantitative  

         NEGATIVE         

                                        NEGATIVE        

 

                    Urine glucose detection by automated test strip           NE

GATIVE            

NEGATIVE        

 

                          Erythrocytes detection in urine sediment by light micr

oscopy           NEGATIVE 

                                        NEGATIVE        

 

                    Urine ketones detection by automated test strip           NE

GATIVE            

NEGATIVE        

 

                    Urine nitrite detection by test strip           NEGATIVE    

        NEGATIVE    

    

 

                    Urine total bilirubin detection by test strip           NEGA

TIVE            

NEGATIVE        

 

                          Urine urobilinogen measurement by automated test strip

 (mass/volume)           

0.2 mg/dL                               < = 1.0        

 

                    Urine leukocyte esterase detection by dipstick           NEG

ATIVE            

NEGATIVE        

 

                                        Automated urine sediment erythrocyte cou

nt by microscopy (number/high power 

field)                    NONE                      NRG        

 

                                        Automated urine sediment leukocyte count

 by microscopy (number/high power field)

                           [HPF]                    NRG        

 

                          Bacteria detection in urine sediment by light microsco

py           TRACE        

                                        NRG        

 

                                        Squamous epithelial cells detection in u

rine sediment by light microscopy       

                          0-2                       NRG        

 

                          Crystals detection in urine sediment by light microsco

py           NONE         

                                        NRG        

 

                    Casts detection in urine sediment by light microscopy       

    NONE                

NRG        

 

                          Mucus detection in urine sediment by light microscopy 

          NEGATIVE        

                                        NRG        

 

                    Complete urinalysis with reflex to culture           YES    

             NRG        

 

                                        Urine drug screening test - 20 13:

05         

 

                    Urine phencyclidine detection by screening method           

NEGATIVE            

NEGATIVE        

 

                          Urine benzodiazepines detection by screening method   

        NEGATIVE          

                                        NEGATIVE        

 

                    Urine cocaine detection           NEGATIVE            NEGATI

VE        

 

                    Urine amphetamines detection by screening method           N

EGATIVE            

NEGATIVE        

 

                          Urine methamphetamine detection by screening method   

        NEGATIVE          

                                        NEGATIVE        

 

                    Urine cannabinoids detection by screening method           N

EGATIVE            

NEGATIVE        

 

                    Urine opiates detection by screening method           NEGATI

VE            

NEGATIVE        

 

                    Urine barbiturates detection           NEGATIVE            N

EGATIVE        

 

                          Screening urine tricyclic antidepressants detection   

        POSITIVE          

                                        NEGATIVE        

 

                    Urine methadone detection by screening method           NEGA

TIVE            

NEGATIVE        

 

                    Urine oxycodone detection           NEGATIVE            NEGA

TIVE        

 

                    Urine propoxyphene detection           NEGATIVE            N

EGATIVE        

 

                                        Bacterial urine culture - 20 13:05

         

 

                    Bacterial urine culture           3 OR MORE            NRG  

      

 

                    COLONY COUNT           70,000 cfu/ml            NRG        

 

                    SUSCEPTIBILITY           SUGGESTING PROBABLE COLLECTION     

       NRG        

 

                    MRSA SCREEN           CONTAMINATION WITH SKIN LÓPEZ         

   NRG        

 

                    RAPID ID            NO SUSCEPTIBILITY PERFORMED            N

RG        

 

                                        Complete blood count (CBC) with automate

d white blood cell (WBC) differential - 

20 15:25         

 

                          Blood leukocytes automated count (number/volume)      

     14.6 10*3/uL         

                                        4.3-11.0        

 

                          Blood erythrocytes automated count (number/volume)    

       3.67 10*6/uL       

                                        4.35-5.85        

 

                    Venous blood hemoglobin measurement (mass/volume)           

12.5 g/dL           

13.3-17.7        

 

                    Blood hematocrit (volume fraction)           37 %           

     40-54        

 

                    Automated erythrocyte mean corpuscular volume           100 

[foz_us]           

80-99        

 

                                        Automated erythrocyte mean corpuscular h

emoglobin (mass per erythrocyte)        

                          34 pg                     25-34        

 

                                        Automated erythrocyte mean corpuscular h

emoglobin concentration measurement 

(mass/volume)             34 g/dL                   32-36        

 

                    Automated erythrocyte distribution width ratio           16.

3 %              10.0-

14.5        

 

                    Automated blood platelet count (count/volume)           136 

10*3/uL           

130-400        

 

                          Automated blood platelet mean volume measurement      

     10.9 [foz_us]        

                                        7.4-10.4        

 

                    Automated blood neutrophils/100 leukocytes           83 %   

             42-75       

 

 

                    Automated blood lymphocytes/100 leukocytes           8 %    

             12-44        

 

                    Blood monocytes/100 leukocytes           8 %                

 0-12        

 

                    Automated blood eosinophils/100 leukocytes           1 %    

             0-10        

 

                    Automated blood basophils/100 leukocytes           0 %      

           0-10        

 

                    Blood neutrophils automated count (number/volume)           

12.2 10*3           

1.8-7.8        

 

                    Blood lymphocytes automated count (number/volume)           

1.2 10*3            

1.0-4.0        

 

                    Blood monocytes automated count (number/volume)           1.

1 10*3            

0.0-1.0        

 

                    Automated eosinophil count           0.1 10*3/uL           0

.0-0.3        

 

                    Automated blood basophil count (count/volume)           0.0 

10*3/uL           

0.0-0.1        

 

                                        Blood lactic acid measurement (moles/vol

ume) - 20 15:25         

 

                    Blood lactic acid measurement (moles/volume)           1.06 

mmol/L           

0.50-2.00        

 

                                        Comprehensive metabolic panel - 20

 15:25         

 

                          Serum or plasma sodium measurement (moles/volume)     

      142 mmol/L          

                                        135-145        

 

                          Serum or plasma potassium measurement (moles/volume)  

         2.6 mmol/L       

                                        3.6-5.0        

 

                          Serum or plasma chloride measurement (moles/volume)   

        97 mmol/L         

                                                

 

                    Carbon dioxide           32 mmol/L           21-32        

 

                          Serum or plasma anion gap determination (moles/volume)

           13 mmol/L      

                                        5-14        

 

                          Serum or plasma urea nitrogen measurement (mass/volume

)           9 mg/dL       

                                        7-18        

 

                          Serum or plasma creatinine measurement (mass/volume)  

         0.84 mg/dL       

                                        0.60-1.30        

 

                    Serum or plasma urea nitrogen/creatinine mass ratio         

  11                  NRG 

       

 

                                        Serum or plasma creatinine measurement w

ith calculation of estimated glomerular 

filtration rate           >                         NRG        

 

                    Serum or plasma glucose measurement (mass/volume)           

84 mg/dL            

        

 

                    Serum or plasma calcium measurement (mass/volume)           

8.6 mg/dL           

8.5-10.1        

 

                          Serum or plasma total bilirubin measurement (mass/volu

me)           1.1 mg/dL   

                                        0.1-1.0        

 

                                        Serum or plasma alkaline phosphatase nelson

surement (enzymatic activity/volume)    

                          56 U/L                            

 

                                        Serum or plasma aspartate aminotransfera

se measurement (enzymatic 

activity/volume)           14 U/L                    5-34        

 

                                        Serum or plasma alanine aminotransferase

 measurement (enzymatic activity/volume)

                          10 U/L                    0-55        

 

                    Serum or plasma protein measurement (mass/volume)           

6.9 g/dL            

6.4-8.2        

 

                    Serum or plasma albumin measurement (mass/volume)           

3.3 g/dL            

3.2-4.5        

 

                    CALCIUM CORRECTED           9.2 mg/dL           8.5-10.1    

    

 

                                        PROCALCITONIN (PCT) - 20 15:25    

     

 

                    PROCALCITONIN (PCT)           0.16 ng/mL           <0.10    

    

 

                                        Serum or plasma troponin i.cardiac measu

rement (mass/volume) - 20 15:25   

      

 

                          Serum or plasma troponin i.cardiac measurement (mass/v

olume)           < ng/mL  

                                        <0.028        

 

                                        Manual absolute plasma cell count -  15:25         

 

                    Blood monocytes/100 leukocytes           8 %                

 NRG        

 

                    Manual blood segmented neutrophils/100 leukocytes           

83 %                NRG  

      

 

                    Manual blood lymphocytes/100 leukocytes           8 %       

          NRG        

 

                    Manual eosinophils/100 leukocytes in nose           1 %     

            NRG        

 

                    Blood anisocytosis detection by light microscopy           S

LIGHT              NRG

        

 

                                        Serum or plasma lithium measurement (mol

es/volume) - 20 15:25         

 

                    BNP PT              23.6 pg/mL           <100.0        

 

                                        Erythrocyte sedimentation rate by dmitriy

gren method - 20 15:25         

 

                    Erythrocyte sedimentation rate by westergren method         

  58 mm               0-

30        

 

                                        Serum or plasma amylase measurement (enz

ymatic activity/volume) - 20 15:25

         

 

                          Serum or plasma amylase measurement (enzymatic activit

y/volume)           25 U/L

                                                

 

                                        Lipase - 20 15:25         

 

                    Lipase              < U/L               8-78        

 

                                        THYROID STIMULATING HORMONE - 20 1

5:25         

 

                    THYROID STIMULATING HORMONE           0.66 u[iU]/mL         

  0.35-4.94        

 

                                        PT panel in platelet poor plasma by coag

ulation assay - 20 15:39         

 

                          Prothrombin time (PT) in platelet poor plasma by coagu

lation assay           

15.2 s                                  12.2-14.7        

 

                          INR in platelet poor plasma or blood by coagulation as

say           1.2         

                                        0.8-1.4        

 

                                        Activated partial thromboplastin time (a

PTT) in platelet poor plasma 

bycoagulation assay - 20 15:39         

 

                                        Activated partial thromboplastin time (a

PTT) in platelet poor plasma 

bycoagulation assay           33 s                      24-35        



                                                                      



Encounters

      



                ACCT No.           Visit Date/Time           Discharge          

 Status         

             Pt. Type           Provider           Facility           Loc./Unit 

          

Complaint        

 

                [id number]           2020 15:20:00           2020 23:59:

59           [facility]

                Outpatient           KIARA RICO                           [facility]                                       

 

             [id number]           2020 15:20:00                              

       Document

 Registration                                                                   

 

 

                    [id number]           2020 12:05:00           

020 14:18:00        

                DIS             Emergency           HODA BOB DO        

   [facility]           ER FS                     BACK/PIO SIDE PAIN     

   

 

                    [id number]           2019 09:21:00           

019 11:05:00        

                DIS             Emergency           PEGGY LOPEZ, JARRET COBB          

 [facility]           ER FS                     MEDS CAUSING PT TO BE L

IGHTHEADED

        

 

                    [id number]           2019 07:43:00           

019 08:39:00        

                DIS             Emergency           NEVIN VELOZ MD         

  [facility]           ER FS                     RT ANKLE PAIN        

 

             [id number]           2020 15:53:00                         

            

Document Registration

## 2020-03-26 NOTE — NUR
PT'S SISTER, JOE, CALLED THIS RN AT THIS TIME FOR AN UPDATE ON PT'S CONDITION. UPDATE 
GIVEN. ALL QUESTIONS ANSWERED AT THIS TIME.

## 2020-03-26 NOTE — CONSULTATION - SURGERY
History of Present Illness


History of Present Illness


Patient Consulted On(darwin/time)


3/26/20


 17:29


Time Seen by Provider:  16:45


History of Present Illness


Surgery is asked to consult regarding abnormal weight loss, back pain and Left 

sided pain.





HPI:  Pt is a 65 yo male who was directly admitted to the hospital from Urgent 

care.  Apparently he was at work and was noted to have some mental status 

changes.  Pt states he has been very forgetful and states; "I'll come back and 

not even remember if I finished what I was just working on".  He has had an 

80lbs weight loss in a very short period of time and states he has not been 

trying to lose weight.  He has complained of some back pain and left flank pain 

that started about a week ago.  He denies abdominal pain.  States he hasn't been

to the doctor "in forever".  He denies hematochezia, melena or hematemesis.





Allergies and Home Medications


Allergies


Coded Allergies:  


     No Known Drug Allergies (Unverified , 19)





Home Medications


Cyclobenzaprine HCl 10 Mg Tablet, 10 MG PO TID PRN for SPASMS


   Prescribed by: HODA BOB on 3/25/20 1404


Ibuprofen 600 Mg Tablet, 600 MG PO Q6H PRN for PAIN-MILD


   Prescribed by: HODA BOB on 3/25/20 1404





Patient Home Medication List


Home Medication List Reviewed:  Yes





Past Medical-Social-Family Hx


Patient Social History


Alcohol Use:  Occasionally Uses


Number of Drinks Today:  2


Recreational Drug Use:  No


Smoking Status:  Never a Smoker


Type Used:  Smokeless Tobacco


2nd Hand Smoke Exposure:  No


Recent Foreign Travel:  No


Contact w/Someone Who Travel:  No


Recent Infectious Disease Expo:  No


Recent Hopitalizations:  No


Physical Abuse Screen:  No


Sexual Abuse:  No





Seasonal Allergies


Seasonal Allergies:  No





Surgeries


History of Surgeries:  Yes


Surgeries:  Gallbladder





Respiratory


History of Respiratory Disorde:  No


Respiratory Disorders:  Pneumonia





Cardiovascular


History of Cardiac Disorders:  Yes


Cardiac Disorders:  Hypertension





Neurological


History of Neurological Disord:  No





Genitourinary


History of Genitourinary Disor:  No





Gastrointestinal


History of Gastrointestinal Di:  No





Musculoskeletal


History of Musculoskeletal Dis:  Yes


Musculoskeletal Disorders:  Arthritis, Chronic Back Pain





Endocrine


History of Endocrine Disorders:  No





HEENT


History of HEENT Disorders:  No


Loss of Vision:  Denies


Hearing Impairment:  Denies





Cancer


History of Cancer:  No





Psychosocial


History of Psychiatric Problem:  Yes


Behavioral Health Disorders:  Anxiety





Integumentary


History of Skin or Integumenta:  No





Blood Transfusions


History of Blood Disorders:  No


Adverse Reaction to a Blood Tr:  No





Family Medical History


Significant Family History:  Diabetes (denies any in his family), GI Disease 

(denies any in his family), Other Conditions/Hx (Pt states his father was 

electrocuted and mother  in childbirth)





Review of Systems-General


Constitutional:  malaise, weakness, weight loss


EENTM:  No blurred vision, No double vision, No vision loss, No mouth pain, No 

mouth swelling, No throat swelling


Respiratory:  No cough, No dyspnea on exertion, No short of breath


Cardiovascular:  No chest pain, No edema, No palpitations


Gastrointestinal:  No abdominal pain, No dysphagia, No heartburn, No nausea, No 

vomiting


Genitourinary:  No dysuria, No frequency, No hematuria; hesitancy


Musculoskeletal:  back pain, joint pain, joint swelling, muscle stiffness, 

muscle weakness


Skin:  No change in color, No change in hair/nails; lesions (left leg)


Psychiatric/Neurological:  Anxiety, Headache; Denies Seizure, Denies Tremors


Other


pt denies any hx of abnormal bleeding or bruising





Physical Exam-General Problems


Physical Exam


Vital Signs





Vital Signs - First Documented








 3/26/20 3/26/20





 14:29 15:00


 


Temp 36.8 


 


Pulse 80 


 


Resp 18 


 


B/P (MAP) 146/89 


 


Pulse Ox 96 


 


O2 Delivery Room Air 


 


FiO2  21





Capillary Refill :


General Appearance:  WD/WN, no apparent distress


Eyes:  Bilateral Eye PERRL, Bilateral Eye EOMI


HEENT:  pharynx normal; No scleral icterus (R), No scleral icterus (L)


Neck:  non-tender, supple, normal inspection


Respiratory:  chest non-tender, lungs clear, normal breath sounds, no 

respiratory distress, no accessory muscle use


Cardiovascular:  regular rate, rhythm, no murmur


Gastrointestinal:  non tender, soft, no organomegaly, no pulsatile mass, hernia 

(??small UH)


Back:  no CVA tenderness, no vertebral tenderness


Extremities:  no pedal edema, no calf tenderness, normal capillary refill


Neurologic/Psychiatric:  CNs II-XII nml as tested, alert, normal mood/affect, 

oriented x 3


Skin:  normal color, warm/dry, other (pt has plaque on left lower leg, ??chronic

venous stasis change.  Pt has a lot of loose skin on legs and abdomen)


Lymphatic:  no adenopathy (neck, axilla or groin)





Data Review


Labs


Laboratory Tests


3/26/20 15:25: 


White Blood Count 14.6H, Red Blood Count 3.67L, Hemoglobin 12.5L, Hematocrit 37L

, Mean Corpuscular Volume 100H, Mean Corpuscular Hemoglobin 34, Mean Corpuscular

Hemoglobin Concent 34, Red Cell Distribution Width 16.3H, Platelet Count 136, 

Mean Platelet Volume 10.9H, Neutrophils (%) (Auto) 83H, Lymphocytes (%) (Auto) 

8L, Monocytes (%) (Auto) 8, Eosinophils (%) (Auto) 1, Basophils (%) (Auto) 0, 

Neutrophils # (Auto) 12.2H, Lymphocytes # (Auto) 1.2, Monocytes # (Auto) 1.1H, 

Eosinophils # (Auto) 0.1, Basophils # (Auto) 0.0, Neutrophils % (Manual) 83, 

Lymphocytes % (Manual) 8, Monocytes % (Manual) 8, Eosinophils % (Manual) 1, 

Anisocytosis SLIGHT, Erythrocyte Sedimentation Rate 58H, Sodium Level 142, 

Potassium Level 2.6L, Chloride Level 97L, Carbon Dioxide Level 32, Anion Gap 13,

Blood Urea Nitrogen 9, Creatinine 0.84, Estimat Glomerular Filtration Rate > 60,

BUN/Creatinine Ratio 11, Glucose Level 84, Lactic Acid Level 1.06, Calcium Level

8.6, Corrected Calcium 9.2, Total Bilirubin 1.1H, Aspartate Amino Transf 

(AST/SGOT) 14, Alanine Aminotransferase (ALT/SGPT) 10, Alkaline Phosphatase 56, 

Troponin I < 0.028, B-Type Natriuretic Peptide 23.6, Total Protein 6.9, Albumin 

3.3, Amylase Level 25, Lipase < 4L, Procalcitonin 0.16H, Thyroid Stimulating 

Hormone (TSH) 0.66


3/26/20 15:39: 


Prothrombin Time 15.2H, INR Comment 1.2, Activated Partial Thromboplast Time 33





Assessment/Plan


Unexplained Weight Loss


Back pain


Left Flank pain


Hypokalemia


Leukocytosis





Pt is being admitted by Medicine; will need IV fluids, Potassium replacement, 

repeat labs in am.  I looked at CT and nothing jumped out at me; will wait for 

final radiology reading (I did see something on CT head - again will wait for 

final reading).  Nothing 


surgical seen in the abdomen; or need for urgent surgical intervention.  I will 

follow along, thank you for the consult.





Clinical Quality Measures


DVT/VTE Risk/Contraindication:


Risk Factor Score Per Nursin


RFS Level Per Nursing on Admit:  4+=Very High











CAMI DEL CASTILLO DO               Mar 26, 2020 17:34
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
## 2020-03-27 VITALS — DIASTOLIC BLOOD PRESSURE: 78 MMHG | SYSTOLIC BLOOD PRESSURE: 114 MMHG

## 2020-03-27 VITALS — DIASTOLIC BLOOD PRESSURE: 73 MMHG | SYSTOLIC BLOOD PRESSURE: 155 MMHG

## 2020-03-27 VITALS — SYSTOLIC BLOOD PRESSURE: 148 MMHG | DIASTOLIC BLOOD PRESSURE: 71 MMHG

## 2020-03-27 VITALS — SYSTOLIC BLOOD PRESSURE: 127 MMHG | DIASTOLIC BLOOD PRESSURE: 88 MMHG

## 2020-03-27 VITALS — SYSTOLIC BLOOD PRESSURE: 156 MMHG | DIASTOLIC BLOOD PRESSURE: 81 MMHG

## 2020-03-27 VITALS — SYSTOLIC BLOOD PRESSURE: 148 MMHG | DIASTOLIC BLOOD PRESSURE: 70 MMHG

## 2020-03-27 VITALS — DIASTOLIC BLOOD PRESSURE: 73 MMHG | SYSTOLIC BLOOD PRESSURE: 140 MMHG

## 2020-03-27 VITALS — DIASTOLIC BLOOD PRESSURE: 76 MMHG | SYSTOLIC BLOOD PRESSURE: 118 MMHG

## 2020-03-27 VITALS — DIASTOLIC BLOOD PRESSURE: 70 MMHG | SYSTOLIC BLOOD PRESSURE: 14 MMHG

## 2020-03-27 VITALS — DIASTOLIC BLOOD PRESSURE: 70 MMHG | SYSTOLIC BLOOD PRESSURE: 148 MMHG

## 2020-03-27 VITALS — SYSTOLIC BLOOD PRESSURE: 164 MMHG | DIASTOLIC BLOOD PRESSURE: 75 MMHG

## 2020-03-27 VITALS — SYSTOLIC BLOOD PRESSURE: 144 MMHG | DIASTOLIC BLOOD PRESSURE: 68 MMHG

## 2020-03-27 VITALS — DIASTOLIC BLOOD PRESSURE: 70 MMHG | SYSTOLIC BLOOD PRESSURE: 152 MMHG

## 2020-03-27 VITALS — SYSTOLIC BLOOD PRESSURE: 123 MMHG | DIASTOLIC BLOOD PRESSURE: 80 MMHG

## 2020-03-27 VITALS — SYSTOLIC BLOOD PRESSURE: 175 MMHG | DIASTOLIC BLOOD PRESSURE: 87 MMHG

## 2020-03-27 VITALS — SYSTOLIC BLOOD PRESSURE: 153 MMHG | DIASTOLIC BLOOD PRESSURE: 69 MMHG

## 2020-03-27 VITALS — DIASTOLIC BLOOD PRESSURE: 83 MMHG | SYSTOLIC BLOOD PRESSURE: 125 MMHG

## 2020-03-27 VITALS — DIASTOLIC BLOOD PRESSURE: 106 MMHG | SYSTOLIC BLOOD PRESSURE: 167 MMHG

## 2020-03-27 VITALS — DIASTOLIC BLOOD PRESSURE: 70 MMHG | SYSTOLIC BLOOD PRESSURE: 159 MMHG

## 2020-03-27 VITALS — SYSTOLIC BLOOD PRESSURE: 135 MMHG | DIASTOLIC BLOOD PRESSURE: 64 MMHG

## 2020-03-27 LAB
ALBUMIN SERPL-MCNC: 3 GM/DL (ref 3.2–4.5)
ALP SERPL-CCNC: 48 U/L (ref 40–136)
ALT SERPL-CCNC: 6 U/L (ref 0–55)
ANISOCYTOSIS BLD QL SMEAR: SLIGHT
APPEARANCE CSF: CLEAR
BASE EXCESS STD BLDA CALC-SCNC: 6.8 MMOL/L (ref -2.5–2.5)
BASOPHILS # BLD AUTO: 0 10^3/UL (ref 0–0.1)
BASOPHILS NFR BLD AUTO: 0 % (ref 0–10)
BASOPHILS NFR BLD MANUAL: 0 %
BDY SITE: (no result)
BILIRUB SERPL-MCNC: 0.8 MG/DL (ref 0.1–1)
BODY TEMPERATURE: 36.2
BUN/CREAT SERPL: 12
CALCIUM SERPL-MCNC: 8.3 MG/DL (ref 8.5–10.1)
CHLORIDE SERPL-SCNC: 100 MMOL/L (ref 98–107)
CO2 BLDA CALC-SCNC: 31.4 MMOL/L (ref 21–31)
CO2 SERPL-SCNC: 29 MMOL/L (ref 21–32)
COLOR CSF: COLORLESS
CREAT SERPL-MCNC: 0.82 MG/DL (ref 0.6–1.3)
EOSINOPHIL # BLD AUTO: 0.1 10^3/UL (ref 0–0.3)
EOSINOPHIL NFR BLD AUTO: 1 % (ref 0–10)
EOSINOPHIL NFR BLD MANUAL: 0 %
ERYTHROCYTE [DISTWIDTH] IN BLOOD BY AUTOMATED COUNT: 16.2 % (ref 10–14.5)
GFR SERPLBLD BASED ON 1.73 SQ M-ARVRAT: > 60 ML/MIN
GLUCOSE CSF-MCNC: 43 MG/DL (ref 50–80)
GLUCOSE SERPL-MCNC: 88 MG/DL (ref 70–105)
HCT VFR BLD CALC: 34 % (ref 40–54)
HGB BLD-MCNC: 11.5 G/DL (ref 13.3–17.7)
INHALED O2 FLOW RATE: (no result) L/MIN
INR PPP: 1.1 (ref 0.8–1.4)
LYMPHOCYTES # BLD AUTO: 1.2 X 10^3 (ref 1–4)
LYMPHOCYTES NFR BLD AUTO: 11 % (ref 12–44)
LYMPHOCYTES NFR CSF MANUAL: 34 %
MAGNESIUM SERPL-MCNC: 2.1 MG/DL (ref 1.6–2.4)
MCH RBC QN AUTO: 34 PG (ref 25–34)
MCHC RBC AUTO-ENTMCNC: 34 G/DL (ref 32–36)
MCV RBC AUTO: 100 FL (ref 80–99)
MONOCYTES # BLD AUTO: 1 X 10^3 (ref 0–1)
MONOCYTES NFR BLD AUTO: 10 % (ref 0–12)
MONOCYTES NFR BLD: 3 %
NEUTROPHILS # BLD AUTO: 8.4 X 10^3 (ref 1.8–7.8)
NEUTROPHILS NFR BLD AUTO: 78 % (ref 42–75)
NEUTS BAND NFR BLD MANUAL: 83 %
NEUTS BAND NFR BLD: 0 %
PCO2 BLDA: 37 MMHG (ref 35–45)
PH BLDA: 7.52 [PH] (ref 7.37–7.43)
PHOSPHATE SERPL-MCNC: 2.4 MG/DL (ref 2.3–4.7)
PLATELET # BLD: 181 10^3/UL (ref 130–400)
PMV BLD AUTO: 10.3 FL (ref 7.4–10.4)
PO2 BLDA: 61 MMHG (ref 79–93)
POTASSIUM SERPL-SCNC: 2.6 MMOL/L (ref 3.6–5)
PROT CSF-MCNC: 224 MG/DL (ref 15–40)
PROT SERPL-MCNC: 6.1 GM/DL (ref 6.4–8.2)
PROTHROMBIN TIME: 15 SEC (ref 12.2–14.7)
SAO2 % BLDA FROM PO2: 93 % (ref 94–100)
SODIUM SERPL-SCNC: 139 MMOL/L (ref 135–145)
TUBE # CSF: 4
VARIANT LYMPHS NFR BLD MANUAL: 14 %
VENTILATION MODE VENT: YES
WBC # BLD AUTO: 10.7 10^3/UL (ref 4.3–11)

## 2020-03-27 PROCEDURE — 5A1945Z RESPIRATORY VENTILATION, 24-96 CONSECUTIVE HOURS: ICD-10-PCS | Performed by: INTERNAL MEDICINE

## 2020-03-27 PROCEDURE — 0BH17EZ INSERTION OF ENDOTRACHEAL AIRWAY INTO TRACHEA, VIA NATURAL OR ARTIFICIAL OPENING: ICD-10-PCS

## 2020-03-27 PROCEDURE — 009U3ZX DRAINAGE OF SPINAL CANAL, PERCUTANEOUS APPROACH, DIAGNOSTIC: ICD-10-PCS

## 2020-03-27 RX ADMIN — PROPOFOL SCH MLS/HR: 10 INJECTION, EMULSION INTRAVENOUS at 11:36

## 2020-03-27 RX ADMIN — DEXMEDETOMIDINE HYDROCHLORIDE SCH MLS/HR: 100 INJECTION, SOLUTION, CONCENTRATE INTRAVENOUS at 08:39

## 2020-03-27 RX ADMIN — PROPOFOL SCH MLS/HR: 10 INJECTION, EMULSION INTRAVENOUS at 13:59

## 2020-03-27 RX ADMIN — FOLIC ACID SCH MG: 1 TABLET ORAL at 08:14

## 2020-03-27 RX ADMIN — HALOPERIDOL LACTATE PRN MG: 5 INJECTION, SOLUTION INTRAMUSCULAR at 01:27

## 2020-03-27 RX ADMIN — POTASSIUM CHLORIDE SCH MLS/HR: 200 INJECTION, SOLUTION INTRAVENOUS at 19:00

## 2020-03-27 RX ADMIN — PROPOFOL SCH MLS/HR: 10 INJECTION, EMULSION INTRAVENOUS at 19:13

## 2020-03-27 RX ADMIN — POTASSIUM CHLORIDE SCH MLS/HR: 200 INJECTION, SOLUTION INTRAVENOUS at 17:16

## 2020-03-27 RX ADMIN — DOCUSATE SODIUM AND SENNOSIDES SCH EA: 8.6; 5 TABLET, FILM COATED ORAL at 20:13

## 2020-03-27 RX ADMIN — SODIUM CHLORIDE SCH MLS/HR: 900 INJECTION, SOLUTION INTRAVENOUS at 14:45

## 2020-03-27 RX ADMIN — IPRATROPIUM BROMIDE AND ALBUTEROL SULFATE SCH ML: .5; 3 SOLUTION RESPIRATORY (INHALATION) at 02:12

## 2020-03-27 RX ADMIN — POTASSIUM CHLORIDE SCH MLS/HR: 200 INJECTION, SOLUTION INTRAVENOUS at 16:28

## 2020-03-27 RX ADMIN — POTASSIUM CHLORIDE SCH MLS/HR: 200 INJECTION, SOLUTION INTRAVENOUS at 11:14

## 2020-03-27 RX ADMIN — ASCORBIC ACID, VITAMIN A PALMITATE, CHOLECALCIFEROL, THIAMINE HYDROCHLORIDE, RIBOFLAVIN-5 PHOSPHATE SODIUM, PYRIDOXINE HYDROCHLORIDE, NIACINAMIDE, DEXPANTHENOL, ALPHA-TOCOPHEROL ACETATE, VITAMIN K1, FOLIC ACID, BIOTIN, CYANOCOBALAMIN SCH MG: 200; 3300; 200; 6; 3.6; 6; 40; 15; 10; 150; 600; 60; 5 INJECTION, SOLUTION INTRAVENOUS at 08:38

## 2020-03-27 RX ADMIN — SODIUM CHLORIDE SCH MLS/HR: 900 INJECTION, SOLUTION INTRAVENOUS at 21:56

## 2020-03-27 RX ADMIN — DOCUSATE SODIUM AND SENNOSIDES SCH EA: 8.6; 5 TABLET, FILM COATED ORAL at 08:14

## 2020-03-27 RX ADMIN — Medication SCH EA: at 05:15

## 2020-03-27 RX ADMIN — IPRATROPIUM BROMIDE AND ALBUTEROL SULFATE SCH ML: .5; 3 SOLUTION RESPIRATORY (INHALATION) at 14:42

## 2020-03-27 RX ADMIN — ENOXAPARIN SODIUM SCH MG: 100 INJECTION SUBCUTANEOUS at 19:10

## 2020-03-27 RX ADMIN — Medication SCH MG: at 08:14

## 2020-03-27 RX ADMIN — HALOPERIDOL LACTATE PRN MG: 5 INJECTION, SOLUTION INTRAMUSCULAR at 07:39

## 2020-03-27 RX ADMIN — PROPOFOL SCH MLS/HR: 10 INJECTION, EMULSION INTRAVENOUS at 16:43

## 2020-03-27 RX ADMIN — SODIUM CHLORIDE SCH MLS/HR: 900 INJECTION, SOLUTION INTRAVENOUS at 05:14

## 2020-03-27 RX ADMIN — PROPOFOL SCH MLS/HR: 10 INJECTION, EMULSION INTRAVENOUS at 21:57

## 2020-03-27 RX ADMIN — SODIUM CHLORIDE SCH MLS/HR: 900 INJECTION, SOLUTION INTRAVENOUS at 05:15

## 2020-03-27 RX ADMIN — POTASSIUM CHLORIDE SCH MLS/HR: 200 INJECTION, SOLUTION INTRAVENOUS at 09:47

## 2020-03-27 RX ADMIN — AMLODIPINE BESYLATE SCH MG: 5 TABLET ORAL at 08:27

## 2020-03-27 RX ADMIN — SODIUM CHLORIDE SCH MLS/HR: 900 INJECTION, SOLUTION INTRAVENOUS at 16:13

## 2020-03-27 RX ADMIN — SODIUM CHLORIDE SCH MLS/HR: 900 INJECTION, SOLUTION INTRAVENOUS at 03:22

## 2020-03-27 RX ADMIN — POTASSIUM CHLORIDE SCH MLS/HR: 200 INJECTION, SOLUTION INTRAVENOUS at 06:59

## 2020-03-27 RX ADMIN — IPRATROPIUM BROMIDE AND ALBUTEROL SULFATE SCH ML: .5; 3 SOLUTION RESPIRATORY (INHALATION) at 10:06

## 2020-03-27 RX ADMIN — ENOXAPARIN SODIUM SCH MG: 100 INJECTION SUBCUTANEOUS at 05:14

## 2020-03-27 RX ADMIN — IPRATROPIUM BROMIDE AND ALBUTEROL SULFATE SCH ML: .5; 3 SOLUTION RESPIRATORY (INHALATION) at 23:39

## 2020-03-27 RX ADMIN — IPRATROPIUM BROMIDE AND ALBUTEROL SULFATE SCH ML: .5; 3 SOLUTION RESPIRATORY (INHALATION) at 02:16

## 2020-03-27 RX ADMIN — SODIUM CHLORIDE SCH MLS/HR: 900 INJECTION, SOLUTION INTRAVENOUS at 13:04

## 2020-03-27 RX ADMIN — SODIUM CHLORIDE SCH MLS/HR: 900 INJECTION, SOLUTION INTRAVENOUS at 06:59

## 2020-03-27 RX ADMIN — POTASSIUM CHLORIDE SCH MLS/HR: 200 INJECTION, SOLUTION INTRAVENOUS at 08:38

## 2020-03-27 RX ADMIN — POTASSIUM CHLORIDE SCH MLS/HR: 200 INJECTION, SOLUTION INTRAVENOUS at 11:36

## 2020-03-27 NOTE — PROGRESS NOTE - SURGERY
Subjective


Time Seen by a Provider:  09:26


Subjective/Events-last exam


Pt seen and examined, sedated in ICU.  Nurse states pt became combative this am 

and "tore out all IV's and lines".


Review of Systems


Pulmonary:  No Dyspnea, No Cough


Cardiovascular:  No: Chest Pain, Palpitations


Gastrointestinal:  No: Nausea, Vomiting, Abdominal Pain


Neurological:  Confusion





Focused Exam


Lactate Level


3/26/20 15:25: Lactic Acid Level 1.06





Objective


Exam





Vital Signs








  Date Time  Temp Pulse Resp B/P (MAP) Pulse Ox O2 Delivery O2 Flow Rate FiO2


 


3/27/20 10:00  60 13 125/83 (97) 96 Room Air  


 


3/27/20 09:05  64 19 167/106 (126) 98 Room Air  


 


3/27/20 08:39  63  114/78    


 


3/27/20 08:00 35.9       


 


3/27/20 08:00  68 21 114/78 (90)  Room Air  


 


3/27/20 07:00  64 16 118/76 (90) 96 Room Air  


 


3/27/20 07:00  67      


 


3/27/20 06:41 36.6 68 19 123/80 (94) 95 Room Air  


 


3/26/20 23:42 37.7 78 24 134/75 (94) 93 Room Air  


 


3/26/20 20:40 37.0 86 18 146/80 (102) 98 Room Air  


 


3/26/20 20:00      Room Air  


 


3/26/20 19:00  80      


 


3/26/20 15:42      Room Air  


 


3/26/20 15:38 37.0 73 20 153/87 (109) 98 Room Air  


 


3/26/20 15:20  71      


 


3/26/20 15:00 36.8 80   96   21


 


3/26/20 14:29 36.8 80 18 146/89 96 Room Air  














I & O 


 


 3/27/20





 07:00


 


Intake Total 1280 ml


 


Output Total 50 ml


 


Balance 1230 ml





Capillary Refill : Less Than 3 Seconds


General Appearance:  Obese, Other (sedated)


HEENT:  PERRL/EOMI, Moist Mucous Membranes


Respiratory:  Chest Non Tender, Lungs Clear, Normal Breath Sounds, No Accessory 

Muscle Use, No Respiratory Distress


Cardiovascular:  Regular Rate, Rhythm, Normal Peripheral Pulses


Gastrointestinal:  non tender, soft, no organomegaly, no pulsatile mass, hernia 

(??small UH)


Extremity:  No Pedal Edema


Neurologic/Psychiatric:  Disoriented (subtle confusion and poor recall)





Results


Lab


Laboratory Tests


3/26/20 15:25: 


White Blood Count 14.6H, Red Blood Count 3.67L, Hemoglobin 12.5L, Hematocrit 37L

, Mean Corpuscular Volume 100H, Mean Corpuscular Hemoglobin 34, Mean Corpuscular

Hemoglobin Concent 34, Red Cell Distribution Width 16.3H, Platelet Count 136, 

Mean Platelet Volume 10.9H, Neutrophils (%) (Auto) 83H, Lymphocytes (%) (Auto) 

8L, Monocytes (%) (Auto) 8, Eosinophils (%) (Auto) 1, Basophils (%) (Auto) 0, 

Neutrophils # (Auto) 12.2H, Lymphocytes # (Auto) 1.2, Monocytes # (Auto) 1.1H, 

Eosinophils # (Auto) 0.1, Basophils # (Auto) 0.0, Neutrophils % (Manual) 83, 

Lymphocytes % (Manual) 8, Monocytes % (Manual) 8, Eosinophils % (Manual) 1, 

Anisocytosis SLIGHT, Erythrocyte Sedimentation Rate 58H, Sodium Level 142, 

Potassium Level 2.6L, Chloride Level 97L, Carbon Dioxide Level 32, Anion Gap 13,

Blood Urea Nitrogen 9, Creatinine 0.84, Estimat Glomerular Filtration Rate > 60,

BUN/Creatinine Ratio 11, Glucose Level 84, Lactic Acid Level 1.06, Calcium Level

8.6, Corrected Calcium 9.2, Magnesium Level 1.7, Total Bilirubin 1.1H, Aspartate

Amino Transf (AST/SGOT) 14, Alanine Aminotransferase (ALT/SGPT) 10, Alkaline 

Phosphatase 56, Troponin I < 0.028, B-Type Natriuretic Peptide 23.6, Total 

Protein 6.9, Albumin 3.3, Amylase Level 25, Lipase < 4L, Procalcitonin 0.16H, 

Thyroid Stimulating Hormone (TSH) 0.66


3/26/20 15:39: 


Prothrombin Time 15.2H, INR Comment 1.2, Activated Partial Thromboplast Time 33


3/27/20 08:15: 


White Blood Count 10.7, Red Blood Count 3.40L, Hemoglobin 11.5L, Hematocrit 34L,

Mean Corpuscular Volume 100H, Mean Corpuscular Hemoglobin 34, Mean Corpuscular 

Hemoglobin Concent 34, Red Cell Distribution Width 16.2H, Platelet Count 181, 

Mean Platelet Volume 10.3, Neutrophils (%) (Auto) 78H, Lymphocytes (%) (Auto) 

11L, Monocytes (%) (Auto) 10, Eosinophils (%) (Auto) 1, Basophils (%) (Auto) 0, 

Neutrophils # (Auto) 8.4H, Lymphocytes # (Auto) 1.2, Monocytes # (Auto) 1.0, 

Eosinophils # (Auto) 0.1, Basophils # (Auto) 0.0, Neutrophils % (Manual) 83, 

Lymphocytes % (Manual) 14, Monocytes % (Manual) 3, Eosinophils % (Manual) 0, 

Anisocytosis SLIGHT, Sodium Level 139, Potassium Level 2.6L, Chloride Level 100,

Carbon Dioxide Level 29, Anion Gap 10, Blood Urea Nitrogen 10, Creatinine 0.82, 

Estimat Glomerular Filtration Rate > 60, BUN/Creatinine Ratio 12, Glucose Level 

88, Calcium Level 8.3L, Corrected Calcium 9.1, Magnesium Level 2.1, Total 

Bilirubin 0.8, Aspartate Amino Transf (AST/SGOT) 15, Alanine Aminotransferase 

(ALT/SGPT) 6, Alkaline Phosphatase 48, Total Protein 6.1L, Albumin 3.0L, 

Basophils % (Manual) 0, Band Neutrophils 0, Phosphorus Level 2.4





Assessment/Plan


Unexplained Weight Loss


Back pain


Left Flank pain


Hypokalemia


Leukocytosis


Mental Status Changes





CT read by radiologist and nothing acute seen in Chest/Abd/Pelvis; CT head - 

recommended MRI.  Nothing surgical needed; I will sign off and can reconsult if 

needed.





Clinical Quality Measures


DVT/VTE Risk/Contraindication:


Risk Factor Score Per Nursin


RFS Level Per Nursing on Admit:  4+=Very High











CAMI DEL CASTILLO DO               Mar 27, 2020 10:39

## 2020-03-27 NOTE — DIAGNOSTIC IMAGING REPORT
INDICATION: Intubation.



TIME OF EXAM: 11:12 a.m.



COMPARISON: Correlation is made with prior chest from 03/26/2020.



FINDINGS: Endotracheal tube has been placed, has the tip above

the watson. A nasogastric tube passes into the stomach. The heart

size is stable. There is mild central congestion. No significant

infiltrate is seen. No definite effusion or pneumothorax is

identified.



IMPRESSION: Satisfactory endotracheal tube placement which has

tip well above the watson at the level of the clavicular heads.



Dictated by: 



  Dictated on workstation # [id number]

## 2020-03-27 NOTE — NUR
UPON SHIFT CHANGE, PT BECAME AGITATED AND WANTING TO LEAVE. MULTIPLE ATTEMPTS TO REDIRECT 
PT, UNSUCCESSFUL. NIGHT SHIFT RN SPOKE W/ TELEICU, NEW ORDERS RECEIVED FOR IM ATIVAN AND IM 
HALDOL. DR CORTES UPDATED ON PT'S CONDITION. MULTIPLE ATTEMPTS TO GET A HOLD OF PT'S SISTER, 
JOE-GOES STRAIGHT TO [employer]. WILL ATTEMPT TO GET ANOTHER # FOR HER.

## 2020-03-27 NOTE — NUR
WHEN THIS RN APPROACHED PT TO GIVE HIM AM MEDICATION, PT BECAME PHYSICALLY AGGRESSIVE AND 
COMBATIVE. LIFTED A FIST AND WAS ABOUT TO SWING. THIS RN USED THERAPEUTIC COMMUNICATION 
WITHOUT SUCCESS. THIS RN THEN EXITED THE ROOM AND ALLOWED THE PT TO COOL DOWN. HOUSE 
SUPERVISOR NOTIFIED. THIS RN WILL REMAIN OUT OF PT'S ROOM UNLESS ABSOLUTELY NECESSARY AT 
THIS TIME.

## 2020-03-27 NOTE — PHYSICAL THERAPY PROGRESS NOTE
Therapy Progress Note


Patient intubated and sedated.  Will check on patient condition and start when 

appropriate.











MAYRA GU PT                Mar 27, 2020 12:56 1

## 2020-03-27 NOTE — NUR
DR CORTES IN TO SEE PT, PT TO BE INTUBATED PER DR. CORTES. RT INFORMED AND NURSING 
SUPERVISOR TO NOTIFY RT.

## 2020-03-27 NOTE — NUR
PTS SISTER JOE CALLED THIS RN AND WAS UPDATED ON PT CONDITION. RECEIVED TELEPHONE CONSENT 
FOR LUMBAR PUNCTURE.

## 2020-03-27 NOTE — NUR
NOTIFIED DR. CORTES OF PT BECOMING MORE AGITATED AND COMBATIVE THIS EVENING. PT RIPPED OUT 
IV AND IS PERSISTENT IN LEAVING. PT IS CONFUSED AND DISORIENTED AT THIS TIME. THIS RN AND 
STAFF HAVE ATTEMPTED REORIENTING AND REDIRECTING PT WITH NO LUCK. NEW ORDERS RECEIVED. SEE 
MAR AND ORDER HISTORY.
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
## 2020-03-27 NOTE — NUR
RD ASSESSMENT 



PMHx: HTN; hx of non-compliance; hx of excessive ETOH use



PT INTERACTION: Note pt is sedated, per RN. Note all diet hx is from chart review. Pt's avg 
PO intake 0, as pt has refused meals. Note no reports of emesis. Pt's last BM was 3/27, and 
pt currently on bowel regimen of Senna BID, per chart review. Note unable to determine 
recent wt hx. 



ABNORMAL NUTRITION-RELATED LAB VALUES

LOW: K 2.6; Ca 8.3

HIGH: Pro 6.1; alb 3.0



Est. kcal needs: 4366-9192 kcal | 15-18 kcal/kg 

Est. Pro needs:   g Pro | 0.8-1.0 g Pro/kg 



PES STATEMENT: Inadequate oral intake (NI-2.1) related to loss of appetite as evidenced by 
chart review | Pt refusing meals



INTERVENTION:  

Continue with current diet order of Regular diet. 

Pt may benefit from nutrition supplementation if PO intake remains low. 

Would encourage pt to eat when able. 

Would recommend assessment of thiamine levels, and replete as needed. 

Will continue to follow and reassess as pt needs, intake, and status change. 



MONITOR/EVALUATE:  

PO Intake; Plan of Care; Hydration Status; Weight Status; Lab Values 





MARIA ISABEL De La Cruz, MS, RD, LD

## 2020-03-27 NOTE — OCC THERAPY PROGRESS NOTE
Therapy Progress Note


Pt currently intubated/ sedated. OT to hold therapy at this time and eval/ treat

when medically stable and able to participate in skilled therapy tx session.











KEATON BANKS OTR                Mar 27, 2020 11:38

## 2020-03-27 NOTE — NUR
Received dietary consult regarding pt's vent status. 



See previous RD note for full nutrition assessment. 



If pt is to remain NPO for more than 3days, would recommend the following TF: 



Jevity 1.5 at goal rate of 55ml/hr. Begin at 10ml/hr and increase by 10ml q6h as tolerated. 
Monitor gastric residuals for tolerance. 

At goal rate, provides 1980 kcal (16 kcal/kg); 84 g Pro (0.7 g Pro/kg); and 1003ml free 
water. 

Flush with 75ml H2O q4h for hydration status. With flushes, provides 1453ml free water. 



Will continue to follow and reassess as pt needs, intake, and status change. 



MARIA ISABEL De La Cruz, MS, RD, LD

## 2020-03-27 NOTE — ANESTHESIA-PROCEDURE NOTE
Procedures/Interventions


Procedure Start/Stop/Diagnosis


Date of Procedure:  Mar 27, 2020


Start Time:  14:20


Referring Physician:  Nelson


Preprocedural Diagnosis:  confusion


Stop Time:  14:40





Lumbar Puncture


Discussed Risk,Benefits:  Yes (Patient is intubated. Discussed with his sister 

when consent was obtained.)


Patient Consents:  Yes


Position:  Lying, Left


Sterile Technique:  Yes


Opening Pressure:  17 mmHg


Fluid Color:  first vial slightly opaque, then clear subsequently


Spinal Needle Used:  Other (22g 5 inch Pencan)


Procedure Notes


Called to ICU for lumbar puncture. The patient's CT allowed for us to proceed, 

along with Platelet count > 100,000 and no blood thinners since 1700 3/26/20. 

Consent obtained from the patient's sister since he was intubated and on 

ventilator since this am. The patient is on a Propofol gtt for sedation and we 

gave Rocuronium 50mg IV as well. The patient was turned to left lateral 

position. Sterile prepped and draped his lumbar back and I gave a total of 3 cc 

1% Lidocaine for local at L3-L4. 22 g 5 inch Pencan spinal needle was used to 

locate CSF x 3 attempts. Opening pressure noted at 17 mmHg. 2cc CSF obtained in 

each vial, total of 4. Needle was removed and Bandaid applied. The patient was 

returned to a supine position. Iris, RN assisted us during the procedure with 

positioning.











IRIS CHASE CRNA         Mar 27, 2020 14:51

## 2020-03-27 NOTE — NUR
BED ALARM EXIT ON. PT JUMPED OUT OF BED WITHOUT CALLING FOR HELP. ALARM WENT OFF. BY THE 
TIME THIS RN GOT TO PT'S ROOM, HE SAID, "I HAD TO USE THE BATHROOM SO BAD. THAT NEEDLE HAD 
TO COME OUT. ENOUGH MEDICINE TIME." IV CAN BE SEEN ON THE FLOOR NEXT TO THE BED. THIS RN 
ATTEMPTED TO REORIENT PT AND ASSIST TO BATHROOM. PT BECOMES EXTREMELY AGITATED AND 
COMBATIVE. DR. CORTES NOTIFIED AT THIS TIME.

## 2020-03-27 NOTE — ANESTHESIA-PROCEDURE NOTE
Procedures/Interventions


Procedure Start/Stop/Diagnosis


Date of Procedure:  Mar 27, 2020


Start Time:  10:30


Stop Time:  11:00





Intubation


RSI:  Yes


100% pre-Ox, [id number]:  Yes


Intubation Method:  orotracheal


Videoscope used:  Yes


Grade View:  1


Medications:  Etomidate, Succinylcholine


Mask Ventilation:  positive


Positive End Tide CO2:  Yes


Breath Sounds after Intubation:  bilateral-equal


ETT Securred @ (cm):  23


Intubated with ease:  Yes


Intubation Complications:  no complications





Arterial Line


Arterial Line Catheter:  20G


Type:  Radial


Location:  Right


Procedure:  prepped, draped in sterile fashion, good wave-form was obtained, 

patient tolerated procedure well, no immediate complications, post procedure 

area cleaned, post procedure dressing applied











LORENZO ANDERSON CRNA            Mar 27, 2020 11:09

## 2020-03-27 NOTE — NUR
Sánchez Chacon transferred to room CU6-1, with an admitting diagnosis of PNA, on 03/27/20 
from [facility] 408 accompanied by this RN.SÁNCHEZ CHACON D introduced to surroundings, call 
light, bed controls, phone, TV, temperature control, lights, meal times, smoking policy, 
visitor policy, side rail policy, and bathroom. Pt resting comfortably at this time.

## 2020-03-27 NOTE — NUR
Timeline note below: 

0630-This RN received report from ALEJANDRA Chowdhury and assumed care of patient at this time.

0637- Patient brought to ICU6 accompanied by this RN. Pt resting comfortably in recliner. 
VS: HR 68 R23 02 92% on RA /80. 

0645- This RN notified EICU that patient has arrived to floor. New order for Ativan IVP PRN, 
see order hx. This RN will continue to monitor.

## 2020-03-27 NOTE — NUR
TELEICU UPDATED ON PT'S CONDITION. REVIEWED LAB/ABG RESULTS AND VITAL SIGNS. NO NEW ORDERS 
RECEIVED AT THIS TIME.

## 2020-03-27 NOTE — NUR
TIMELINE:

1035- THIS RN SPOKE W/ PT'S SISTER JOE ABOUT INTUBATION AND ART LINE PLACEMENT. RECEIVED 
TELEPHONE CONSENT, WITNESSED BY ADOLFO ROBERTS.

1040-BRADLEY RODRIGUEZ AT BEDSIDE TO INTUBATE PT.

1042- RT, NURSING STAFF, ANESTHESIA IN ROOM W/ PT. ETOMIDATE 20MG IVP GIVEN AND 
SUCCINYCHOLINE 60MG IVP. 

1043- INTUBATED BY LORENZO RODRIGUEZ W/ SIZE 7.5 ETT, POSITIONED 23 at TEETH. COLOR CHANGE NOTED, 
EVEN CHEST RISE. VENT SETTINGS: AC MODE, , PEEP 5, RR 16, FIO2 21%. OG PLACED BY THIS 
RN AND CONNECTED TO LIS. 

1050- LORENZO CRNA PLACED 20GAUGE RIGHT RADIAL ART LINE. 50MG OF ROCURONIUM GIVEN IVP.

1055- STAT CHEST XRAY ORDERED TO CONFIRM TUBE PLACEMENT.

## 2020-03-28 VITALS — DIASTOLIC BLOOD PRESSURE: 67 MMHG | SYSTOLIC BLOOD PRESSURE: 131 MMHG

## 2020-03-28 VITALS — DIASTOLIC BLOOD PRESSURE: 88 MMHG | SYSTOLIC BLOOD PRESSURE: 127 MMHG

## 2020-03-28 VITALS — SYSTOLIC BLOOD PRESSURE: 143 MMHG | DIASTOLIC BLOOD PRESSURE: 65 MMHG

## 2020-03-28 VITALS — SYSTOLIC BLOOD PRESSURE: 95 MMHG | DIASTOLIC BLOOD PRESSURE: 53 MMHG

## 2020-03-28 VITALS — DIASTOLIC BLOOD PRESSURE: 63 MMHG | SYSTOLIC BLOOD PRESSURE: 136 MMHG

## 2020-03-28 VITALS — SYSTOLIC BLOOD PRESSURE: 127 MMHG | DIASTOLIC BLOOD PRESSURE: 88 MMHG

## 2020-03-28 VITALS — SYSTOLIC BLOOD PRESSURE: 98 MMHG | DIASTOLIC BLOOD PRESSURE: 55 MMHG

## 2020-03-28 VITALS — DIASTOLIC BLOOD PRESSURE: 66 MMHG | SYSTOLIC BLOOD PRESSURE: 152 MMHG

## 2020-03-28 VITALS — SYSTOLIC BLOOD PRESSURE: 135 MMHG | DIASTOLIC BLOOD PRESSURE: 68 MMHG

## 2020-03-28 VITALS — DIASTOLIC BLOOD PRESSURE: 58 MMHG | SYSTOLIC BLOOD PRESSURE: 107 MMHG

## 2020-03-28 VITALS — SYSTOLIC BLOOD PRESSURE: 117 MMHG | DIASTOLIC BLOOD PRESSURE: 78 MMHG

## 2020-03-28 VITALS — SYSTOLIC BLOOD PRESSURE: 170 MMHG | DIASTOLIC BLOOD PRESSURE: 80 MMHG

## 2020-03-28 VITALS — SYSTOLIC BLOOD PRESSURE: 171 MMHG | DIASTOLIC BLOOD PRESSURE: 78 MMHG

## 2020-03-28 VITALS — SYSTOLIC BLOOD PRESSURE: 147 MMHG | DIASTOLIC BLOOD PRESSURE: 75 MMHG

## 2020-03-28 VITALS — SYSTOLIC BLOOD PRESSURE: 100 MMHG | DIASTOLIC BLOOD PRESSURE: 55 MMHG

## 2020-03-28 VITALS — SYSTOLIC BLOOD PRESSURE: 139 MMHG | DIASTOLIC BLOOD PRESSURE: 62 MMHG

## 2020-03-28 VITALS — SYSTOLIC BLOOD PRESSURE: 133 MMHG | DIASTOLIC BLOOD PRESSURE: 72 MMHG

## 2020-03-28 VITALS — SYSTOLIC BLOOD PRESSURE: 142 MMHG | DIASTOLIC BLOOD PRESSURE: 62 MMHG

## 2020-03-28 VITALS — DIASTOLIC BLOOD PRESSURE: 73 MMHG | SYSTOLIC BLOOD PRESSURE: 156 MMHG

## 2020-03-28 VITALS — DIASTOLIC BLOOD PRESSURE: 61 MMHG | SYSTOLIC BLOOD PRESSURE: 139 MMHG

## 2020-03-28 VITALS — SYSTOLIC BLOOD PRESSURE: 144 MMHG | DIASTOLIC BLOOD PRESSURE: 65 MMHG

## 2020-03-28 VITALS — DIASTOLIC BLOOD PRESSURE: 75 MMHG | SYSTOLIC BLOOD PRESSURE: 155 MMHG

## 2020-03-28 VITALS — SYSTOLIC BLOOD PRESSURE: 172 MMHG | DIASTOLIC BLOOD PRESSURE: 81 MMHG

## 2020-03-28 VITALS — DIASTOLIC BLOOD PRESSURE: 63 MMHG | SYSTOLIC BLOOD PRESSURE: 150 MMHG

## 2020-03-28 VITALS — DIASTOLIC BLOOD PRESSURE: 82 MMHG | SYSTOLIC BLOOD PRESSURE: 172 MMHG

## 2020-03-28 VITALS — DIASTOLIC BLOOD PRESSURE: 81 MMHG | SYSTOLIC BLOOD PRESSURE: 152 MMHG

## 2020-03-28 VITALS — SYSTOLIC BLOOD PRESSURE: 87 MMHG | DIASTOLIC BLOOD PRESSURE: 48 MMHG

## 2020-03-28 VITALS — DIASTOLIC BLOOD PRESSURE: 75 MMHG | SYSTOLIC BLOOD PRESSURE: 154 MMHG

## 2020-03-28 VITALS — DIASTOLIC BLOOD PRESSURE: 81 MMHG | SYSTOLIC BLOOD PRESSURE: 154 MMHG

## 2020-03-28 LAB
ARTERIAL PATENCY WRIST A: POSITIVE
BASE EXCESS STD BLDA CALC-SCNC: 4.1 MMOL/L (ref -2.5–2.5)
BASOPHILS # BLD AUTO: 0 10^3/UL (ref 0–0.1)
BASOPHILS NFR BLD AUTO: 0 % (ref 0–10)
BDY SITE: (no result)
BODY TEMPERATURE: 36
BUN/CREAT SERPL: 7
BUN/CREAT SERPL: 8
CALCIUM SERPL-MCNC: 8.4 MG/DL (ref 8.5–10.1)
CALCIUM SERPL-MCNC: 8.5 MG/DL (ref 8.5–10.1)
CHLORIDE SERPL-SCNC: 108 MMOL/L (ref 98–107)
CHLORIDE SERPL-SCNC: 109 MMOL/L (ref 98–107)
CO2 BLDA CALC-SCNC: 28.1 MMOL/L (ref 21–31)
CO2 SERPL-SCNC: 23 MMOL/L (ref 21–32)
CO2 SERPL-SCNC: 24 MMOL/L (ref 21–32)
CREAT SERPL-MCNC: 0.79 MG/DL (ref 0.6–1.3)
CREAT SERPL-MCNC: 0.81 MG/DL (ref 0.6–1.3)
EOSINOPHIL # BLD AUTO: 0.2 10^3/UL (ref 0–0.3)
EOSINOPHIL NFR BLD AUTO: 1 % (ref 0–10)
ERYTHROCYTE [DISTWIDTH] IN BLOOD BY AUTOMATED COUNT: 15.4 % (ref 10–14.5)
GFR SERPLBLD BASED ON 1.73 SQ M-ARVRAT: > 60 ML/MIN
GFR SERPLBLD BASED ON 1.73 SQ M-ARVRAT: > 60 ML/MIN
GLUCOSE SERPL-MCNC: 121 MG/DL (ref 70–105)
GLUCOSE SERPL-MCNC: 150 MG/DL (ref 70–105)
HCT VFR BLD CALC: 35 % (ref 40–54)
HGB BLD-MCNC: 11.9 G/DL (ref 13.3–17.7)
INHALED O2 FLOW RATE: 21 L/MIN
LYMPHOCYTES # BLD AUTO: 1.8 X 10^3 (ref 1–4)
LYMPHOCYTES NFR BLD AUTO: 17 % (ref 12–44)
MAGNESIUM SERPL-MCNC: 2.1 MG/DL (ref 1.6–2.4)
MANUAL DIFFERENTIAL PERFORMED BLD QL: NO
MCH RBC QN AUTO: 33 PG (ref 25–34)
MCHC RBC AUTO-ENTMCNC: 35 G/DL (ref 32–36)
MCV RBC AUTO: 97 FL (ref 80–99)
MONOCYTES # BLD AUTO: 0.7 X 10^3 (ref 0–1)
MONOCYTES NFR BLD AUTO: 7 % (ref 0–12)
NEUTROPHILS # BLD AUTO: 7.8 X 10^3 (ref 1.8–7.8)
NEUTROPHILS NFR BLD AUTO: 75 % (ref 42–75)
PCO2 BLDA: 32 MMHG (ref 35–45)
PH BLDA: 7.54 [PH] (ref 7.37–7.43)
PHOSPHATE SERPL-MCNC: 1.6 MG/DL (ref 2.3–4.7)
PLATELET # BLD: 203 10^3/UL (ref 130–400)
PMV BLD AUTO: 10.4 FL (ref 7.4–10.4)
PO2 BLDA: 70 MMHG (ref 79–93)
POTASSIUM SERPL-SCNC: 3.1 MMOL/L (ref 3.6–5)
POTASSIUM SERPL-SCNC: 3.1 MMOL/L (ref 3.6–5)
SAO2 % BLDA FROM PO2: 96 % (ref 94–100)
SODIUM SERPL-SCNC: 145 MMOL/L (ref 135–145)
SODIUM SERPL-SCNC: 145 MMOL/L (ref 135–145)
VENTILATION MODE VENT: YES
WBC # BLD AUTO: 10.4 10^3/UL (ref 4.3–11)

## 2020-03-28 RX ADMIN — DOCUSATE SODIUM AND SENNOSIDES SCH EA: 8.6; 5 TABLET, FILM COATED ORAL at 20:02

## 2020-03-28 RX ADMIN — MAGNESIUM SULFATE IN DEXTROSE SCH MLS/HR: 10 INJECTION, SOLUTION INTRAVENOUS at 03:59

## 2020-03-28 RX ADMIN — POTASSIUM CHLORIDE SCH MLS/HR: 200 INJECTION, SOLUTION INTRAVENOUS at 03:59

## 2020-03-28 RX ADMIN — POTASSIUM CHLORIDE SCH MLS/HR: 200 INJECTION, SOLUTION INTRAVENOUS at 00:15

## 2020-03-28 RX ADMIN — SODIUM CHLORIDE SCH MLS/HR: 900 INJECTION, SOLUTION INTRAVENOUS at 21:56

## 2020-03-28 RX ADMIN — PROPOFOL SCH MLS/HR: 10 INJECTION, EMULSION INTRAVENOUS at 08:48

## 2020-03-28 RX ADMIN — ASCORBIC ACID, VITAMIN A PALMITATE, CHOLECALCIFEROL, THIAMINE HYDROCHLORIDE, RIBOFLAVIN-5 PHOSPHATE SODIUM, PYRIDOXINE HYDROCHLORIDE, NIACINAMIDE, DEXPANTHENOL, ALPHA-TOCOPHEROL ACETATE, VITAMIN K1, FOLIC ACID, BIOTIN, CYANOCOBALAMIN SCH MG: 200; 3300; 200; 6; 3.6; 6; 40; 15; 10; 150; 600; 60; 5 INJECTION, SOLUTION INTRAVENOUS at 08:32

## 2020-03-28 RX ADMIN — DEXMEDETOMIDINE HYDROCHLORIDE SCH MLS/HR: 100 INJECTION, SOLUTION, CONCENTRATE INTRAVENOUS at 11:54

## 2020-03-28 RX ADMIN — Medication SCH EA: at 04:00

## 2020-03-28 RX ADMIN — PROPOFOL SCH MLS/HR: 10 INJECTION, EMULSION INTRAVENOUS at 23:55

## 2020-03-28 RX ADMIN — SODIUM CHLORIDE SCH MLS/HR: 900 INJECTION, SOLUTION INTRAVENOUS at 12:00

## 2020-03-28 RX ADMIN — SODIUM CHLORIDE SCH MLS/HR: 900 INJECTION, SOLUTION INTRAVENOUS at 20:03

## 2020-03-28 RX ADMIN — POTASSIUM CHLORIDE SCH MLS/HR: 200 INJECTION, SOLUTION INTRAVENOUS at 01:25

## 2020-03-28 RX ADMIN — IPRATROPIUM BROMIDE AND ALBUTEROL SULFATE SCH ML: .5; 3 SOLUTION RESPIRATORY (INHALATION) at 06:45

## 2020-03-28 RX ADMIN — PROPOFOL SCH MLS/HR: 10 INJECTION, EMULSION INTRAVENOUS at 20:09

## 2020-03-28 RX ADMIN — POTASSIUM CHLORIDE SCH MLS/HR: 200 INJECTION, SOLUTION INTRAVENOUS at 02:30

## 2020-03-28 RX ADMIN — AMLODIPINE BESYLATE SCH MG: 5 TABLET ORAL at 08:32

## 2020-03-28 RX ADMIN — DEXMEDETOMIDINE HYDROCHLORIDE SCH MLS/HR: 100 INJECTION, SOLUTION, CONCENTRATE INTRAVENOUS at 00:15

## 2020-03-28 RX ADMIN — PROPOFOL SCH MLS/HR: 10 INJECTION, EMULSION INTRAVENOUS at 01:25

## 2020-03-28 RX ADMIN — PROPOFOL SCH MLS/HR: 10 INJECTION, EMULSION INTRAVENOUS at 05:13

## 2020-03-28 RX ADMIN — SODIUM CHLORIDE SCH MLS/HR: 900 INJECTION, SOLUTION INTRAVENOUS at 05:13

## 2020-03-28 RX ADMIN — POTASSIUM CHLORIDE SCH MEQ: 1500 TABLET, EXTENDED RELEASE ORAL at 04:00

## 2020-03-28 RX ADMIN — SODIUM CHLORIDE SCH MLS/HR: 900 INJECTION, SOLUTION INTRAVENOUS at 04:37

## 2020-03-28 RX ADMIN — ENOXAPARIN SODIUM SCH MG: 100 INJECTION SUBCUTANEOUS at 05:13

## 2020-03-28 RX ADMIN — IPRATROPIUM BROMIDE AND ALBUTEROL SULFATE SCH ML: .5; 3 SOLUTION RESPIRATORY (INHALATION) at 14:24

## 2020-03-28 RX ADMIN — DOCUSATE SODIUM AND SENNOSIDES SCH EA: 8.6; 5 TABLET, FILM COATED ORAL at 08:32

## 2020-03-28 RX ADMIN — IPRATROPIUM BROMIDE AND ALBUTEROL SULFATE SCH ML: .5; 3 SOLUTION RESPIRATORY (INHALATION) at 21:21

## 2020-03-28 RX ADMIN — SODIUM CHLORIDE SCH MLS/HR: 900 INJECTION, SOLUTION INTRAVENOUS at 21:52

## 2020-03-28 RX ADMIN — SODIUM CHLORIDE SCH MLS/HR: 900 INJECTION, SOLUTION INTRAVENOUS at 13:41

## 2020-03-28 RX ADMIN — Medication SCH MG: at 08:32

## 2020-03-28 RX ADMIN — POTASSIUM CHLORIDE SCH MLS/HR: 200 INJECTION, SOLUTION INTRAVENOUS at 03:30

## 2020-03-28 RX ADMIN — SODIUM CHLORIDE SCH MLS/HR: 900 INJECTION, SOLUTION INTRAVENOUS at 15:33

## 2020-03-28 RX ADMIN — FOLIC ACID SCH MG: 1 TABLET ORAL at 08:32

## 2020-03-28 RX ADMIN — ENOXAPARIN SODIUM SCH MG: 100 INJECTION SUBCUTANEOUS at 17:21

## 2020-03-28 RX ADMIN — POTASSIUM CHLORIDE SCH MLS/HR: 200 INJECTION, SOLUTION INTRAVENOUS at 04:37

## 2020-03-28 NOTE — PROGRESS NOTE - HOSPITALIST
Subjective


HPI/CC On Admission


Date Seen by Provider:  Mar 28, 2020


Time Seen by Provider:  10:00


Chief complaint: Altered mental status with profound weight loss with severe 

hypokalemia with leukocytosis





History of present illness: This is a 64-year-old -American male who was 

brought to the [facility] by his  at his company 

extrusions in [place] due to confusion and difficulty tracking and taking 

care of himself.  Apparently went to [place] ER yesterday for lower back pain

lumbar spine x-ray obtained showing no abnormality and was sent home with a 

potassium supplement because it was low but he was noncompliant with that 

prescription as he has been noncompliant with all of Dr. Romero recommendation 

since he sees him in his care van in [place] on a regular basis for hype

rtension management.  He also reports he is an oral tobacco user and he has been

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 

the weekends but does not touch it during the week because it could alter his 

job performance.  He is a .  He has never been  but 

engaged twice and has no children.  His sister was just discharged off my 

service today due to profound hypokalemia of 2.3 requiring aggressive IV 

supplementation.  She is a retired nurse of 35 years at [employer].  Urgent care ARNP called me and reported bilateral lower lobe pneumonia 

and in need of admission to the hospital. Labs and CXR were ordered and sepsis 

protocol followed. At this current time patient does have some problems turning 

in the bed because of left flank pain and I did order CT scans which showed an 

abnormality on the CT of the brain which requires an MRI scheduled for tomorrow 

and Dr. Fox was consulted and he had no evidence of the source of his 

profound weight loss or flank pain.  Urinalysis has been ordered.


Subjective/Events-last exam


Long conversation with eicu doctor


Loading with phenobarbital to help with presumed ETOH withdrawal will be 

initiated


LP analysis reveals high protein and oligo band test pending in case this is 

some sore of MS since CT abnl and can't get MRI due to body size


Remains intubated


Abx maintained from admit


CXR ok


HSV added to LP fluid and started on Acyclovir in case this is herpes 

encephalitis


Lovenox maintained for DVT PPx


PPI added


Checked meds and labs


Conferred with RN





Focused Exam


Lactate Level


3/26/20 15:25: Lactic Acid Level 1.06








Objective


Exam


Vital Signs





Vital Signs








  Date Time  Temp Pulse Resp B/P (MAP) Pulse Ox O2 Delivery O2 Flow Rate FiO2


 


3/28/20 14:25  68 16  96   21


 


3/28/20 14:00    117/78 (91)  Mechanical Ventilator 21.00 


 


3/28/20 12:00 35.3       





Capillary Refill : Less Than 3 Seconds


General Appearance:  No Apparent Distress, Obese, Other (intubated)


Respiratory:  Lungs Clear


Cardiovascular:  Regular Rate, Rhythm


Neurologic/Psychiatric:  Other (sedated on vent)





Results/Procedures


Lab


Laboratory Tests


3/27/20 21:25








3/28/20 03:10








3/28/20 08:40








Patient resulted labs reviewed.





Assessment/Plan


Assessment and Plan


Assess & Plan/Chief Complaint


Assessment:


Respiratory failure requiring elective intubation vent day # 2


Encephalopathy presumed ETOH withdrawal adding phenobarbital


80# wt loss in the past 8 months?


Dehydration


Hypokalemia severe requiring IV supplement refractory requiring more supplement 

today


HTN


Leukocytosis improved


Left flank pain 


Abnormal CT scan ordered MRI but can't fit in machine so obtained LP for fluid 

analysis which reveals elevated protein adding HSV to LP analysis and adding 

Acyclovir


Non-compliance with Dr Romero med recs in the past


Oral tobacco user


Regular ETOH user





Plan:


Vent management appreciated by EICU


IVF


Potassium IV


Magnesium IV


ETOH vitamin deficiency supplement


CT scan reviewed and LP obtained and reviewed and added Acyclovir


Dr Fox consultation


MRI brain since abnl CT but can't fit in machine


Pain control


Repat CT to evaluate any evolution or change of status/abnl


Appreciate EICU





Diagnosis/Problems


Diagnosis/Problems





(1) Encephalopathy


(2) Abnormal CT of brain


(3) Hypertension


(4) Excessive drinking alcohol


(5) Weight loss, unintentional


(6) Tobacco chew use


(7) Left flank pain


(8) Pneumonia


(9) Leukocytosis


Status:  Acute


(10) Hypokalemia


Status:  Acute


(11) Lumbar radiculopathy


Status:  Acute


(12) Ventilator dependence


(13) Aggression


(14) Spinal fluid protein elevation





Clinical Quality Measures


DVT/VTE Risk/Contraindication:


Risk Factor Score Per Nursin


RFS Level Per Nursing on Admit:  4+=Very High











ABISAI CORTES DO                Mar 28, 2020 10:00

## 2020-03-28 NOTE — DIAGNOSTIC IMAGING REPORT
PROCEDURE: CT head with and without contrast.



TECHNIQUE: Multiple contiguous axial images were obtained through

the brain before and after the administration of intravenous

contrast. Auto Exposure Controls were utilized during the CT exam

to meet ALARA standards for radiation dose reduction. 



INDICATION:  Altered mental status, status change. Intubated.



CORRELATION STUDY: 

03/26/2020



FINDINGS:  

Ventricles and sulci appearing unchanged. There are prominent,

scattered patchy areas of decreased attenuation again

demonstrated. This is most pronounced adjacent to the posterior

horn and to a lesser degree anterior horns bilateral lateral

ventricles. These appear generally stable. More focal regional

area of low-attenuation through a large portion of the right

temporal lobe anteriorly also appears generally stable. Slight ex

vacuo dilatation of temporal horn adjacent to this area

suggestive of volume loss. Definitive new regional area of

decreased attenuation suggest edema is not suggested. There is no

midline shift or mass effect. No intracranial hemorrhage. No

hyperdense MCA sign. There is presence of intracranial vascular

calcification.



Post contrast imaging demonstrates no abnormal areas of

intracranial enhancement.



Mildly prominent ossification of the frontal bone. Paranasal

sinus and mastoid air cells are generally clear.



Patient is noted to be intubated.



IMPRESSION:

1. Generally stable appearing pre and postcontrast CT imaging of

the head. No definitive new findings or acute intracranial

abnormality.

2. Scattered areas of low attenuation likely reflect small vessel

ischemic disease with a more focal area of low-attenuation in the

right temporal lobe. These areas may reflect underlying chronic

small vessel disease and perhaps previous areas of

infarct/encephalomalacia.



Dictated by: 



  Dictated on workstation # [id number]

## 2020-03-29 VITALS — SYSTOLIC BLOOD PRESSURE: 121 MMHG | DIASTOLIC BLOOD PRESSURE: 99 MMHG

## 2020-03-29 VITALS — DIASTOLIC BLOOD PRESSURE: 67 MMHG | SYSTOLIC BLOOD PRESSURE: 134 MMHG

## 2020-03-29 VITALS — SYSTOLIC BLOOD PRESSURE: 97 MMHG | DIASTOLIC BLOOD PRESSURE: 54 MMHG

## 2020-03-29 VITALS — SYSTOLIC BLOOD PRESSURE: 88 MMHG | DIASTOLIC BLOOD PRESSURE: 63 MMHG

## 2020-03-29 VITALS — DIASTOLIC BLOOD PRESSURE: 86 MMHG | SYSTOLIC BLOOD PRESSURE: 180 MMHG

## 2020-03-29 VITALS — SYSTOLIC BLOOD PRESSURE: 104 MMHG | DIASTOLIC BLOOD PRESSURE: 57 MMHG

## 2020-03-29 VITALS — SYSTOLIC BLOOD PRESSURE: 139 MMHG | DIASTOLIC BLOOD PRESSURE: 70 MMHG

## 2020-03-29 VITALS — SYSTOLIC BLOOD PRESSURE: 166 MMHG | DIASTOLIC BLOOD PRESSURE: 77 MMHG

## 2020-03-29 VITALS — DIASTOLIC BLOOD PRESSURE: 79 MMHG | SYSTOLIC BLOOD PRESSURE: 160 MMHG

## 2020-03-29 VITALS — DIASTOLIC BLOOD PRESSURE: 61 MMHG | SYSTOLIC BLOOD PRESSURE: 127 MMHG

## 2020-03-29 VITALS — SYSTOLIC BLOOD PRESSURE: 114 MMHG | DIASTOLIC BLOOD PRESSURE: 64 MMHG

## 2020-03-29 VITALS — DIASTOLIC BLOOD PRESSURE: 79 MMHG | SYSTOLIC BLOOD PRESSURE: 152 MMHG

## 2020-03-29 VITALS — SYSTOLIC BLOOD PRESSURE: 136 MMHG | DIASTOLIC BLOOD PRESSURE: 71 MMHG

## 2020-03-29 VITALS — DIASTOLIC BLOOD PRESSURE: 48 MMHG | SYSTOLIC BLOOD PRESSURE: 85 MMHG

## 2020-03-29 VITALS — SYSTOLIC BLOOD PRESSURE: 103 MMHG | DIASTOLIC BLOOD PRESSURE: 59 MMHG

## 2020-03-29 VITALS — DIASTOLIC BLOOD PRESSURE: 80 MMHG | SYSTOLIC BLOOD PRESSURE: 100 MMHG

## 2020-03-29 VITALS — DIASTOLIC BLOOD PRESSURE: 73 MMHG | SYSTOLIC BLOOD PRESSURE: 145 MMHG

## 2020-03-29 VITALS — DIASTOLIC BLOOD PRESSURE: 89 MMHG | SYSTOLIC BLOOD PRESSURE: 122 MMHG

## 2020-03-29 VITALS — DIASTOLIC BLOOD PRESSURE: 76 MMHG | SYSTOLIC BLOOD PRESSURE: 158 MMHG

## 2020-03-29 VITALS — SYSTOLIC BLOOD PRESSURE: 131 MMHG | DIASTOLIC BLOOD PRESSURE: 61 MMHG

## 2020-03-29 VITALS — DIASTOLIC BLOOD PRESSURE: 66 MMHG | SYSTOLIC BLOOD PRESSURE: 127 MMHG

## 2020-03-29 VITALS — DIASTOLIC BLOOD PRESSURE: 77 MMHG | SYSTOLIC BLOOD PRESSURE: 159 MMHG

## 2020-03-29 VITALS — DIASTOLIC BLOOD PRESSURE: 56 MMHG | SYSTOLIC BLOOD PRESSURE: 90 MMHG

## 2020-03-29 VITALS — DIASTOLIC BLOOD PRESSURE: 79 MMHG | SYSTOLIC BLOOD PRESSURE: 163 MMHG

## 2020-03-29 VITALS — DIASTOLIC BLOOD PRESSURE: 57 MMHG | SYSTOLIC BLOOD PRESSURE: 101 MMHG

## 2020-03-29 VITALS — SYSTOLIC BLOOD PRESSURE: 172 MMHG | DIASTOLIC BLOOD PRESSURE: 90 MMHG

## 2020-03-29 VITALS — SYSTOLIC BLOOD PRESSURE: 191 MMHG | DIASTOLIC BLOOD PRESSURE: 93 MMHG

## 2020-03-29 VITALS — SYSTOLIC BLOOD PRESSURE: 115 MMHG | DIASTOLIC BLOOD PRESSURE: 65 MMHG

## 2020-03-29 VITALS — SYSTOLIC BLOOD PRESSURE: 130 MMHG | DIASTOLIC BLOOD PRESSURE: 66 MMHG

## 2020-03-29 LAB
ARTERIAL PATENCY WRIST A: (no result)
ARTERIAL PATENCY WRIST A: POSITIVE
BASE EXCESS STD BLDA CALC-SCNC: 1.4 MMOL/L (ref -2.5–2.5)
BASE EXCESS STD BLDA CALC-SCNC: 2.4 MMOL/L (ref -2.5–2.5)
BASOPHILS # BLD AUTO: 0 10^3/UL (ref 0–0.1)
BASOPHILS NFR BLD AUTO: 0 % (ref 0–10)
BDY SITE: (no result)
BDY SITE: (no result)
BODY TEMPERATURE: 35.9
BODY TEMPERATURE: 36.2
BUN/CREAT SERPL: 6
CALCIUM SERPL-MCNC: 7.9 MG/DL (ref 8.5–10.1)
CHLORIDE SERPL-SCNC: 113 MMOL/L (ref 98–107)
CO2 BLDA CALC-SCNC: 26.2 MMOL/L (ref 21–31)
CO2 BLDA CALC-SCNC: 26.4 MMOL/L (ref 21–31)
CO2 SERPL-SCNC: 23 MMOL/L (ref 21–32)
CREAT SERPL-MCNC: 0.81 MG/DL (ref 0.6–1.3)
EOSINOPHIL # BLD AUTO: 0.1 10^3/UL (ref 0–0.3)
EOSINOPHIL NFR BLD AUTO: 1 % (ref 0–10)
ERYTHROCYTE [DISTWIDTH] IN BLOOD BY AUTOMATED COUNT: 15.9 % (ref 10–14.5)
GFR SERPLBLD BASED ON 1.73 SQ M-ARVRAT: > 60 ML/MIN
GLUCOSE SERPL-MCNC: 119 MG/DL (ref 70–105)
HCT VFR BLD CALC: 33 % (ref 40–54)
HGB BLD-MCNC: 11.3 G/DL (ref 13.3–17.7)
INHALED O2 FLOW RATE: (no result) L/MIN
INHALED O2 FLOW RATE: 21 L/MIN
LYMPHOCYTES # BLD AUTO: 1.2 X 10^3 (ref 1–4)
LYMPHOCYTES NFR BLD AUTO: 11 % (ref 12–44)
MAGNESIUM SERPL-MCNC: 1.9 MG/DL (ref 1.6–2.4)
MANUAL DIFFERENTIAL PERFORMED BLD QL: NO
MCH RBC QN AUTO: 34 PG (ref 25–34)
MCHC RBC AUTO-ENTMCNC: 35 G/DL (ref 32–36)
MCV RBC AUTO: 98 FL (ref 80–99)
MONOCYTES # BLD AUTO: 0.8 X 10^3 (ref 0–1)
MONOCYTES NFR BLD AUTO: 7 % (ref 0–12)
NEUTROPHILS # BLD AUTO: 9.4 X 10^3 (ref 1.8–7.8)
NEUTROPHILS NFR BLD AUTO: 82 % (ref 42–75)
PCO2 BLDA: 31 MMHG (ref 35–45)
PCO2 BLDA: 35 MMHG (ref 35–45)
PH BLDA: 7.46 [PH] (ref 7.37–7.43)
PH BLDA: 7.52 [PH] (ref 7.37–7.43)
PHOSPHATE SERPL-MCNC: 2.7 MG/DL (ref 2.3–4.7)
PLATELET # BLD: 190 10^3/UL (ref 130–400)
PMV BLD AUTO: 10.6 FL (ref 7.4–10.4)
PO2 BLDA: 79 MMHG (ref 79–93)
PO2 BLDA: 81 MMHG (ref 79–93)
POTASSIUM SERPL-SCNC: 3.1 MMOL/L (ref 3.6–5)
SAO2 % BLDA FROM PO2: 97 % (ref 94–100)
SAO2 % BLDA FROM PO2: 98 % (ref 94–100)
SODIUM SERPL-SCNC: 146 MMOL/L (ref 135–145)
TRIGL SERPL-MCNC: 75 MG/DL (ref ?–150)
VENTILATION MODE VENT: YES
VENTILATION MODE VENT: YES
WBC # BLD AUTO: 11.5 10^3/UL (ref 4.3–11)

## 2020-03-29 RX ADMIN — SODIUM CHLORIDE, SODIUM LACTATE, POTASSIUM CHLORIDE, AND CALCIUM CHLORIDE SCH MLS/HR: 600; 310; 30; 20 INJECTION, SOLUTION INTRAVENOUS at 08:59

## 2020-03-29 RX ADMIN — IPRATROPIUM BROMIDE AND ALBUTEROL SULFATE SCH ML: .5; 3 SOLUTION RESPIRATORY (INHALATION) at 18:09

## 2020-03-29 RX ADMIN — IPRATROPIUM BROMIDE AND ALBUTEROL SULFATE SCH ML: .5; 3 SOLUTION RESPIRATORY (INHALATION) at 14:32

## 2020-03-29 RX ADMIN — SODIUM CHLORIDE, SODIUM LACTATE, POTASSIUM CHLORIDE, AND CALCIUM CHLORIDE SCH MLS/HR: 600; 310; 30; 20 INJECTION, SOLUTION INTRAVENOUS at 14:00

## 2020-03-29 RX ADMIN — PROPOFOL SCH MLS/HR: 10 INJECTION, EMULSION INTRAVENOUS at 20:13

## 2020-03-29 RX ADMIN — PROPOFOL SCH MLS/HR: 10 INJECTION, EMULSION INTRAVENOUS at 04:08

## 2020-03-29 RX ADMIN — Medication SCH EA: at 05:23

## 2020-03-29 RX ADMIN — PROPOFOL SCH MLS/HR: 10 INJECTION, EMULSION INTRAVENOUS at 21:49

## 2020-03-29 RX ADMIN — SODIUM CHLORIDE SCH MLS/HR: 900 INJECTION, SOLUTION INTRAVENOUS at 05:11

## 2020-03-29 RX ADMIN — SODIUM CHLORIDE SCH MLS/HR: 900 INJECTION, SOLUTION INTRAVENOUS at 13:17

## 2020-03-29 RX ADMIN — SODIUM CHLORIDE SCH MLS/HR: 900 INJECTION, SOLUTION INTRAVENOUS at 18:13

## 2020-03-29 RX ADMIN — SODIUM CHLORIDE SCH MLS/HR: 900 INJECTION, SOLUTION INTRAVENOUS at 22:32

## 2020-03-29 RX ADMIN — PANTOPRAZOLE SODIUM SCH MG: 40 TABLET, DELAYED RELEASE ORAL at 08:59

## 2020-03-29 RX ADMIN — Medication SCH MLS/HR: at 11:25

## 2020-03-29 RX ADMIN — POTASSIUM CHLORIDE SCH MLS/HR: 200 INJECTION, SOLUTION INTRAVENOUS at 03:10

## 2020-03-29 RX ADMIN — PROPOFOL SCH MLS/HR: 10 INJECTION, EMULSION INTRAVENOUS at 12:47

## 2020-03-29 RX ADMIN — SODIUM CHLORIDE SCH MLS/HR: 900 INJECTION, SOLUTION INTRAVENOUS at 13:11

## 2020-03-29 RX ADMIN — MAGNESIUM SULFATE IN DEXTROSE SCH MLS/HR: 10 INJECTION, SOLUTION INTRAVENOUS at 03:11

## 2020-03-29 RX ADMIN — Medication SCH MLS/HR: at 17:20

## 2020-03-29 RX ADMIN — PROPOFOL SCH MLS/HR: 10 INJECTION, EMULSION INTRAVENOUS at 09:01

## 2020-03-29 RX ADMIN — POTASSIUM CHLORIDE SCH MLS/HR: 200 INJECTION, SOLUTION INTRAVENOUS at 06:52

## 2020-03-29 RX ADMIN — FOLIC ACID SCH MG: 1 TABLET ORAL at 08:58

## 2020-03-29 RX ADMIN — Medication SCH MG: at 08:59

## 2020-03-29 RX ADMIN — AMLODIPINE BESYLATE SCH MG: 5 TABLET ORAL at 08:59

## 2020-03-29 RX ADMIN — SODIUM CHLORIDE SCH MLS/HR: 900 INJECTION, SOLUTION INTRAVENOUS at 05:10

## 2020-03-29 RX ADMIN — ASCORBIC ACID, VITAMIN A PALMITATE, CHOLECALCIFEROL, THIAMINE HYDROCHLORIDE, RIBOFLAVIN-5 PHOSPHATE SODIUM, PYRIDOXINE HYDROCHLORIDE, NIACINAMIDE, DEXPANTHENOL, ALPHA-TOCOPHEROL ACETATE, VITAMIN K1, FOLIC ACID, BIOTIN, CYANOCOBALAMIN SCH MG: 200; 3300; 200; 6; 3.6; 6; 40; 15; 10; 150; 600; 60; 5 INJECTION, SOLUTION INTRAVENOUS at 08:59

## 2020-03-29 RX ADMIN — ENOXAPARIN SODIUM SCH MG: 100 INJECTION SUBCUTANEOUS at 05:10

## 2020-03-29 RX ADMIN — POTASSIUM CHLORIDE SCH MLS/HR: 200 INJECTION, SOLUTION INTRAVENOUS at 03:36

## 2020-03-29 RX ADMIN — DOCUSATE SODIUM AND SENNOSIDES SCH EA: 8.6; 5 TABLET, FILM COATED ORAL at 08:15

## 2020-03-29 RX ADMIN — DEXMEDETOMIDINE HYDROCHLORIDE SCH MLS/HR: 100 INJECTION, SOLUTION, CONCENTRATE INTRAVENOUS at 05:10

## 2020-03-29 RX ADMIN — SODIUM CHLORIDE SCH MLS/HR: 900 INJECTION, SOLUTION INTRAVENOUS at 01:26

## 2020-03-29 RX ADMIN — SODIUM CHLORIDE SCH MLS/HR: 900 INJECTION, SOLUTION INTRAVENOUS at 06:53

## 2020-03-29 RX ADMIN — IPRATROPIUM BROMIDE AND ALBUTEROL SULFATE SCH ML: .5; 3 SOLUTION RESPIRATORY (INHALATION) at 07:01

## 2020-03-29 RX ADMIN — POTASSIUM CHLORIDE SCH MEQ: 1500 TABLET, EXTENDED RELEASE ORAL at 03:11

## 2020-03-29 RX ADMIN — IPRATROPIUM BROMIDE AND ALBUTEROL SULFATE SCH ML: .5; 3 SOLUTION RESPIRATORY (INHALATION) at 02:13

## 2020-03-29 RX ADMIN — Medication SCH MLS/HR: at 06:53

## 2020-03-29 RX ADMIN — ENOXAPARIN SODIUM SCH MG: 100 INJECTION SUBCUTANEOUS at 16:27

## 2020-03-29 RX ADMIN — SODIUM CHLORIDE SCH MLS/HR: 900 INJECTION, SOLUTION INTRAVENOUS at 21:50

## 2020-03-29 RX ADMIN — POTASSIUM CHLORIDE SCH MLS/HR: 200 INJECTION, SOLUTION INTRAVENOUS at 04:44

## 2020-03-29 RX ADMIN — SODIUM CHLORIDE SCH MLS/HR: 900 INJECTION, SOLUTION INTRAVENOUS at 14:36

## 2020-03-29 RX ADMIN — POTASSIUM CHLORIDE SCH MLS/HR: 200 INJECTION, SOLUTION INTRAVENOUS at 07:30

## 2020-03-29 RX ADMIN — DOCUSATE SODIUM AND SENNOSIDES SCH EA: 8.6; 5 TABLET, FILM COATED ORAL at 21:49

## 2020-03-29 RX ADMIN — SODIUM CHLORIDE SCH MLS/HR: 900 INJECTION, SOLUTION INTRAVENOUS at 13:12

## 2020-03-29 RX ADMIN — SODIUM CHLORIDE, SODIUM LACTATE, POTASSIUM CHLORIDE, AND CALCIUM CHLORIDE SCH MLS/HR: 600; 310; 30; 20 INJECTION, SOLUTION INTRAVENOUS at 03:33

## 2020-03-29 RX ADMIN — Medication SCH MLS/HR: at 22:31

## 2020-03-29 RX ADMIN — SODIUM CHLORIDE, SODIUM LACTATE, POTASSIUM CHLORIDE, AND CALCIUM CHLORIDE SCH MLS/HR: 600; 310; 30; 20 INJECTION, SOLUTION INTRAVENOUS at 23:54

## 2020-03-29 NOTE — CONSULTATION REPORT
DATE OF SERVICE:  03/29/2020



ATTENDING PHYSICIAN:

Dr. Maria M Damon.



HISTORY OF PRESENT ILLNESS:

The patient is a 64-year-old male who was seen in consultation with Dr. Gandara. 

The patient was directly admitted to the hospital from urgent care.  He was at

work, was noted to have some mental status changes.  The patient did state at

that time that he had been forgetful and stated that _____ come back and _____

remember if he finished what he was working on.  He did report he had 80-pound

weight loss over a short time and reports he has not been trying to lose any

weight.  He did complain of some back and left flank pain, which he reports

started a week ago.  He denied any abdominal pain.  He was admitted to the

medical surgical floor and did become combative requiring sedation and

mechanical ventilation.  He was then admitted to ICU where he did develop

hypotension requiring vasopressors.  We were then consulted for placement of

subclavian central line placement.



PAST MEDICAL HISTORY:

Anxiety, arthritis, chronic back pain, hypertension, history of pneumonia.



PAST SURGICAL HISTORY:

Cholecystectomy.



ALLERGIES:

No known drug allergies.



MEDICATIONS:

1.  Flexeril 10 mg t.i.d. p.r.n.

2.  Ibuprofen 600 mg q.6 hours p.r.n.



SOCIAL HISTORY:

Positive for smokeless tobacco, positive for alcohol.



FAMILY HISTORY:

Noncontributory.



VITAL SIGNS:

Temperature 36.4 degrees Celsius, pulse 56, respirations 16, blood pressure is

131/61, pulse ox 99% on 21% mechanical ventilator.



REVIEW OF SYSTEMS:

Unable to obtain due to the patient sedated on ventilator.



PHYSICAL EXAMINATION:

CHEST:  Clear.  Good breath sounds bilaterally.

HEART:  Regular, no murmurs.

EXTREMITIES:  No lower extremity edema.  Negative Homans sign.

HEENT:  No scleral icterus.

NECK:  No cervical lymphadenopathy.

ABDOMEN:  Soft, nondistended.

SKIN:  Warm, dry and pink.

NEUROLOGIC:  Unable to assess due to the patient sedated on ventilator.



ASSESSMENT AND PLAN:

A 64-year-old male with history of alcohol abuse, weight loss as well as

respiratory failure requiring mechanical ventilation in hypotension.  At this

time, he is needing central venous access for vasoactive pressors as well as IV

fluids and antibiotics.  We will proceed with placement of the central venous

catheter once consent is obtained.  We will then have the patient continue with

medical management.





Job ID: [id number]

DocumentID: [id number]

Dictated Date:  03/29/2020 12:12:54

Transcription Date: 03/29/2020 13:36:36

Dictated By: LAUREN NICHOLE

## 2020-03-29 NOTE — DIAGNOSTIC IMAGING REPORT
INDICATION: Pneumonia.



Comparison made with prior examination from  03/28/2020.



FINDINGS: There is cardiomegaly. There is some venous congestion.

 ET and NG tubes are in place. There is no pneumothorax.

Mediastinum is unremarkable.



IMPRESSION: Cardiomegaly and some central pulmonary venous

congestion.



Dictated by: 



  Dictated on workstation # [id number]

## 2020-03-29 NOTE — PROGRESS NOTE - HOSPITALIST
Subjective


HPI/CC On Admission


Date Seen by Provider:  Mar 29, 2020


Time Seen by Provider:  11:00


Chief complaint: Altered mental status with profound weight loss with severe 

hypokalemia with leukocytosis





History of present illness: This is a 64-year-old -American male who was 

brought to the [facility] by his  at his company 

extrusions in [place] due to confusion and difficulty tracking and taking 

care of himself.  Apparently went to [place] ER yesterday for lower back pain

lumbar spine x-ray obtained showing no abnormality and was sent home with a 

potassium supplement because it was low but he was noncompliant with that 

prescription as he has been noncompliant with all of Dr. Romero recommendation 

since he sees him in his care van in [place] on a regular basis for hype

rtension management.  He also reports he is an oral tobacco user and he has been

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 

the weekends but does not touch it during the week because it could alter his 

job performance.  He is a .  He has never been  but 

engaged twice and has no children.  His sister was just discharged off my 

service today due to profound hypokalemia of 2.3 requiring aggressive IV 

supplementation.  She is a retired nurse of 35 years at [employer].  Urgent care ARNP called me and reported bilateral lower lobe pneumonia 

and in need of admission to the hospital. Labs and CXR were ordered and sepsis 

protocol followed. At this current time patient does have some problems turning 

in the bed because of left flank pain and I did order CT scans which showed an 

abnormality on the CT of the brain which requires an MRI scheduled for tomorrow 

and Dr. Fox was consulted and he had no evidence of the source of his 

profound weight loss or flank pain.  Urinalysis has been ordered.


Subjective/Events-last exam


Patient was hypotensive this morning requiring low dose pressor therapy


Spoke to ICU doctor in-depth


Reviewed all scans and labs and meds


No pain is assessed


Sedation maintained


Phenobarbital load and maintained now and tolerated well


NGT placed and nutrition started yesterday


Checked meds and labs


Conferred with RN








I rounded again on the patient at 1700 in the midst of weaning and possible 

extubation and I assessed the patient to not be tracking well off sedation and 

patient was placed back on sedation and eicu doctor was updated





Focused Exam


Lactate Level


3/29/20 12:00: Lactic Acid Level 1.21








Objective


Exam


Vital Signs





Vital Signs








  Date Time  Temp Pulse Resp B/P (MAP) Pulse Ox O2 Delivery O2 Flow Rate FiO2


 


3/29/20 20:13  72  127/66    


 


3/29/20 18:09   16  99   21


 


3/29/20 18:00      Mechanical Ventilator 21.00 


 


3/29/20 15:47 36.1       





Capillary Refill : Less Than 3 Seconds


General Appearance:  No Apparent Distress, WD/WN, Other (sedated on vent)


Respiratory:  Chest Non Tender, Lungs Clear, Normal Breath Sounds, No Accessory 

Muscle Use, No Respiratory Distress


Cardiovascular:  Regular Rate, Rhythm


Neurologic/Psychiatric:  Other (intubated)





Results/Procedures


Lab


Laboratory Tests


3/29/20 02:30








Patient resulted labs reviewed.





Assessment/Plan


Assessment and Plan


Assess & Plan/Chief Complaint


Assessment:


Respiratory failure requiring elective intubation vent day # 3


Encephalopathy presumed ETOH withdrawal adding phenobarbital


80# wt loss in the past 8 months?


Dehydration


Hypokalemia severe requiring IV supplement refractory requiring more supplement 

today


HTN


Leukocytosis improved


Left flank pain 


Abnormal CT scan ordered MRI but can't fit in machine so obtained LP for fluid 

analysis which reveals elevated protein adding HSV to LP analysis and adding 

Acyclovir


Non-compliance with Dr Romero med recs in the past


Oral tobacco user


Regular ETOH user





Plan:


Vent management appreciated by EICU


IVF


Potassium IV


Magnesium IV


ETOH vitamin deficiency supplement


CT scan reviewed and LP obtained and reviewed and added Acyclovir


Dr Fox consultation


MRI brain since abnl CT but can't fit in machine


Pain control


Repeated CT to evaluate any evolution or change of status/abnl


Appreciate EICU





Diagnosis/Problems


Diagnosis/Problems





(1) Encephalopathy


(2) Abnormal CT of brain


(3) Hypertension


(4) Excessive drinking alcohol


(5) Weight loss, unintentional


(6) Tobacco chew use


(7) Left flank pain


(8) Pneumonia


(9) Leukocytosis


Status:  Acute


(10) Hypokalemia


Status:  Acute


(11) Lumbar radiculopathy


Status:  Acute


(12) Ventilator dependence


(13) Aggression


(14) Spinal fluid protein elevation





Clinical Quality Measures


DVT/VTE Risk/Contraindication:


Risk Factor Score Per Nursin


RFS Level Per Nursing on Admit:  4+=Very High











ABISAI CORTES DO                Mar 29, 2020 11:02

## 2020-03-29 NOTE — OPERATIVE REPORT
DATE OF SERVICE:  03/29/2020



ADMITTING PHYSICIAN:

Dr. Damon.



INDICATIONS:

The patient is a 64-year-old male brought in due to confusion from [place]. 

He was confused and did develop respiratory insufficiency requiring intubation. 

His blood pressure has been stable; however, he has lost IV access.  He will

need a central venous catheter due to poor peripheral venous circulation as well

as possible pressors for his pneumonia.



DESCRIPTION OF PROCEDURE:

The chest and neck were prepped and draped in standard surgical fashion.  1%

lidocaine was then used to anesthetize the subclavian region.  The left

subclavian vein was then cannulated with drawing of venous blood.  The guidewire

was then inserted without any resistance.  The cannulating needle removed and a

skin incision made using 11 blade.  A tract was then created using a venous

dilator and through this opening, a triple lumen central venous catheter was

placed over the guidewire using the Seldinger technique.  The guidewire was then

removed and all three ports cindy venous blood and saline pushed in without any

resistance.  The catheter was then sutured to the skin using interrupted 3-0

silk sutures.  The catheter was then cleaned and covered with Op-Site.



The patient tolerated the procedure well.  The post-procedure chest x-ray shows

proper placement and may be accessed and used any time.





Job ID: [id number]

DocumentID: [id number]

Dictated Date:  03/29/2020 11:53:47

Transcription Date: 03/29/2020 14:26:10

Dictated By: DEISI CORONA MD

## 2020-03-29 NOTE — NUR
E-ICU NOTIFIED AGAIN AT 0600 OF CONTINUED HYPOTENSION AND LOW URINE OUTPUT. NEW ORDERS 
RECEIVED AT THIS TIME.

## 2020-03-29 NOTE — NUR
CENTRAL LINE PLACED BY PRANAY ANGEL USING STERILE TECHNIQUE, CXR CONFIRMATION PLACEMENT 
CHECKED BY DR CORONA AND DR MORAN TO USE CENTRAL LINE.

## 2020-03-29 NOTE — DIAGNOSTIC IMAGING REPORT
EXAM: CHEST 1 VIEW, AP/PA ONLY



INDICATION: Central line placement.



COMPARISON: 03/29/2020.



FINDINGS: Cardiomegaly with prominent central pulmonary

vascularity. No pleural effusion or pneumothorax. ETT tip at the

level of the clavicles. NG tube tip and side-port within the

stomach. New left PICC tip mid SVC.



IMPRESSION: 

1. New left PICC tip mid SVC.

2. ETT in expected position.

3. Stable cardiomegaly with prominent central pulmonary

vascularity.



Dictated by: 



  Dictated on workstation # [id number]

## 2020-03-30 VITALS — SYSTOLIC BLOOD PRESSURE: 135 MMHG | DIASTOLIC BLOOD PRESSURE: 73 MMHG

## 2020-03-30 VITALS — SYSTOLIC BLOOD PRESSURE: 144 MMHG | DIASTOLIC BLOOD PRESSURE: 109 MMHG

## 2020-03-30 VITALS — DIASTOLIC BLOOD PRESSURE: 81 MMHG | SYSTOLIC BLOOD PRESSURE: 156 MMHG

## 2020-03-30 VITALS — SYSTOLIC BLOOD PRESSURE: 149 MMHG | DIASTOLIC BLOOD PRESSURE: 90 MMHG

## 2020-03-30 VITALS — SYSTOLIC BLOOD PRESSURE: 145 MMHG | DIASTOLIC BLOOD PRESSURE: 94 MMHG

## 2020-03-30 VITALS — DIASTOLIC BLOOD PRESSURE: 95 MMHG | SYSTOLIC BLOOD PRESSURE: 140 MMHG

## 2020-03-30 VITALS — SYSTOLIC BLOOD PRESSURE: 146 MMHG | DIASTOLIC BLOOD PRESSURE: 98 MMHG

## 2020-03-30 VITALS — DIASTOLIC BLOOD PRESSURE: 96 MMHG | SYSTOLIC BLOOD PRESSURE: 143 MMHG

## 2020-03-30 VITALS — DIASTOLIC BLOOD PRESSURE: 92 MMHG | SYSTOLIC BLOOD PRESSURE: 138 MMHG

## 2020-03-30 VITALS — DIASTOLIC BLOOD PRESSURE: 95 MMHG | SYSTOLIC BLOOD PRESSURE: 135 MMHG

## 2020-03-30 VITALS — SYSTOLIC BLOOD PRESSURE: 159 MMHG | DIASTOLIC BLOOD PRESSURE: 105 MMHG

## 2020-03-30 VITALS — SYSTOLIC BLOOD PRESSURE: 153 MMHG | DIASTOLIC BLOOD PRESSURE: 99 MMHG

## 2020-03-30 VITALS — DIASTOLIC BLOOD PRESSURE: 105 MMHG | SYSTOLIC BLOOD PRESSURE: 155 MMHG

## 2020-03-30 VITALS — DIASTOLIC BLOOD PRESSURE: 99 MMHG | SYSTOLIC BLOOD PRESSURE: 156 MMHG

## 2020-03-30 VITALS — SYSTOLIC BLOOD PRESSURE: 151 MMHG | DIASTOLIC BLOOD PRESSURE: 99 MMHG

## 2020-03-30 VITALS — DIASTOLIC BLOOD PRESSURE: 94 MMHG | SYSTOLIC BLOOD PRESSURE: 136 MMHG

## 2020-03-30 VITALS — SYSTOLIC BLOOD PRESSURE: 139 MMHG | DIASTOLIC BLOOD PRESSURE: 96 MMHG

## 2020-03-30 VITALS — SYSTOLIC BLOOD PRESSURE: 143 MMHG | DIASTOLIC BLOOD PRESSURE: 89 MMHG

## 2020-03-30 VITALS — DIASTOLIC BLOOD PRESSURE: 65 MMHG | SYSTOLIC BLOOD PRESSURE: 137 MMHG

## 2020-03-30 VITALS — SYSTOLIC BLOOD PRESSURE: 148 MMHG | DIASTOLIC BLOOD PRESSURE: 100 MMHG

## 2020-03-30 VITALS — SYSTOLIC BLOOD PRESSURE: 161 MMHG | DIASTOLIC BLOOD PRESSURE: 99 MMHG

## 2020-03-30 VITALS — DIASTOLIC BLOOD PRESSURE: 79 MMHG | SYSTOLIC BLOOD PRESSURE: 158 MMHG

## 2020-03-30 VITALS — SYSTOLIC BLOOD PRESSURE: 157 MMHG | DIASTOLIC BLOOD PRESSURE: 102 MMHG

## 2020-03-30 VITALS — DIASTOLIC BLOOD PRESSURE: 73 MMHG | SYSTOLIC BLOOD PRESSURE: 118 MMHG

## 2020-03-30 VITALS — SYSTOLIC BLOOD PRESSURE: 156 MMHG | DIASTOLIC BLOOD PRESSURE: 112 MMHG

## 2020-03-30 LAB
ARTERIAL PATENCY WRIST A: POSITIVE
BASE EXCESS STD BLDA CALC-SCNC: 0.2 MMOL/L (ref -2.5–2.5)
BASOPHILS # BLD AUTO: 0 10^3/UL (ref 0–0.1)
BASOPHILS NFR BLD AUTO: 0 % (ref 0–10)
BDY SITE: (no result)
BODY TEMPERATURE: 36.4
BUN/CREAT SERPL: 6
CALCIUM SERPL-MCNC: 7.8 MG/DL (ref 8.5–10.1)
CHLORIDE SERPL-SCNC: 116 MMOL/L (ref 98–107)
CO2 BLDA CALC-SCNC: 24.4 MMOL/L (ref 21–31)
CO2 SERPL-SCNC: 22 MMOL/L (ref 21–32)
CREAT SERPL-MCNC: 0.83 MG/DL (ref 0.6–1.3)
EOSINOPHIL # BLD AUTO: 0.2 10^3/UL (ref 0–0.3)
EOSINOPHIL NFR BLD AUTO: 2 % (ref 0–10)
ERYTHROCYTE [DISTWIDTH] IN BLOOD BY AUTOMATED COUNT: 15.9 % (ref 10–14.5)
GFR SERPLBLD BASED ON 1.73 SQ M-ARVRAT: > 60 ML/MIN
GLUCOSE SERPL-MCNC: 89 MG/DL (ref 70–105)
HCT VFR BLD CALC: 31 % (ref 40–54)
HGB BLD-MCNC: 10.4 G/DL (ref 13.3–17.7)
INHALED O2 FLOW RATE: 21 L/MIN
LYMPHOCYTES # BLD AUTO: 1.5 X 10^3 (ref 1–4)
LYMPHOCYTES NFR BLD AUTO: 14 % (ref 12–44)
MAGNESIUM SERPL-MCNC: 1.8 MG/DL (ref 1.6–2.4)
MANUAL DIFFERENTIAL PERFORMED BLD QL: NO
MCH RBC QN AUTO: 33 PG (ref 25–34)
MCHC RBC AUTO-ENTMCNC: 33 G/DL (ref 32–36)
MCV RBC AUTO: 99 FL (ref 80–99)
MONOCYTES # BLD AUTO: 0.8 X 10^3 (ref 0–1)
MONOCYTES NFR BLD AUTO: 7 % (ref 0–12)
NEUTROPHILS # BLD AUTO: 7.9 X 10^3 (ref 1.8–7.8)
NEUTROPHILS NFR BLD AUTO: 77 % (ref 42–75)
PCO2 BLDA: 31 MMHG (ref 35–45)
PH BLDA: 7.49 [PH] (ref 7.37–7.43)
PHOSPHATE SERPL-MCNC: 3 MG/DL (ref 2.3–4.7)
PLATELET # BLD: 202 10^3/UL (ref 130–400)
PMV BLD AUTO: 10.7 FL (ref 7.4–10.4)
PO2 BLDA: 80 MMHG (ref 79–93)
POTASSIUM SERPL-SCNC: 2.7 MMOL/L (ref 3.6–5)
SAO2 % BLDA FROM PO2: 97 % (ref 94–100)
SODIUM SERPL-SCNC: 148 MMOL/L (ref 135–145)
VENTILATION MODE VENT: YES
WBC # BLD AUTO: 10.4 10^3/UL (ref 4.3–11)

## 2020-03-30 RX ADMIN — SODIUM CHLORIDE SCH MLS/HR: 900 INJECTION, SOLUTION INTRAVENOUS at 14:30

## 2020-03-30 RX ADMIN — DEXMEDETOMIDINE HYDROCHLORIDE SCH MLS/HR: 100 INJECTION, SOLUTION, CONCENTRATE INTRAVENOUS at 19:34

## 2020-03-30 RX ADMIN — POTASSIUM CHLORIDE SCH MLS/HR: 200 INJECTION, SOLUTION INTRAVENOUS at 08:07

## 2020-03-30 RX ADMIN — SODIUM CHLORIDE SCH MLS/HR: 900 INJECTION, SOLUTION INTRAVENOUS at 23:54

## 2020-03-30 RX ADMIN — Medication SCH EA: at 05:48

## 2020-03-30 RX ADMIN — HYDRALAZINE HYDROCHLORIDE PRN MG: 20 INJECTION INTRAMUSCULAR; INTRAVENOUS at 05:46

## 2020-03-30 RX ADMIN — DEXTROSE MONOHYDRATE, SODIUM CHLORIDE, AND POTASSIUM CHLORIDE SCH MLS/HR: 50; 4.5; 1.49 INJECTION, SOLUTION INTRAVENOUS at 05:51

## 2020-03-30 RX ADMIN — DEXMEDETOMIDINE HYDROCHLORIDE SCH MLS/HR: 100 INJECTION, SOLUTION, CONCENTRATE INTRAVENOUS at 09:09

## 2020-03-30 RX ADMIN — DEXTROSE MONOHYDRATE, SODIUM CHLORIDE, AND POTASSIUM CHLORIDE SCH MLS/HR: 50; 4.5; 1.49 INJECTION, SOLUTION INTRAVENOUS at 13:04

## 2020-03-30 RX ADMIN — PANTOPRAZOLE SODIUM SCH MG: 40 TABLET, DELAYED RELEASE ORAL at 10:21

## 2020-03-30 RX ADMIN — ASCORBIC ACID, VITAMIN A PALMITATE, CHOLECALCIFEROL, THIAMINE HYDROCHLORIDE, RIBOFLAVIN-5 PHOSPHATE SODIUM, PYRIDOXINE HYDROCHLORIDE, NIACINAMIDE, DEXPANTHENOL, ALPHA-TOCOPHEROL ACETATE, VITAMIN K1, FOLIC ACID, BIOTIN, CYANOCOBALAMIN SCH MG: 200; 3300; 200; 6; 3.6; 6; 40; 15; 10; 150; 600; 60; 5 INJECTION, SOLUTION INTRAVENOUS at 09:00

## 2020-03-30 RX ADMIN — POTASSIUM CHLORIDE SCH MEQ: 1500 TABLET, EXTENDED RELEASE ORAL at 06:43

## 2020-03-30 RX ADMIN — POTASSIUM CHLORIDE SCH MLS/HR: 200 INJECTION, SOLUTION INTRAVENOUS at 10:15

## 2020-03-30 RX ADMIN — ENOXAPARIN SODIUM SCH MG: 100 INJECTION SUBCUTANEOUS at 17:38

## 2020-03-30 RX ADMIN — FOLIC ACID SCH MG: 1 TABLET ORAL at 09:16

## 2020-03-30 RX ADMIN — ASCORBIC ACID, VITAMIN A PALMITATE, CHOLECALCIFEROL, THIAMINE HYDROCHLORIDE, RIBOFLAVIN-5 PHOSPHATE SODIUM, PYRIDOXINE HYDROCHLORIDE, NIACINAMIDE, DEXPANTHENOL, ALPHA-TOCOPHEROL ACETATE, VITAMIN K1, FOLIC ACID, BIOTIN, CYANOCOBALAMIN SCH MG: 200; 3300; 200; 6; 3.6; 6; 40; 15; 10; 150; 600; 60; 5 INJECTION, SOLUTION INTRAVENOUS at 09:16

## 2020-03-30 RX ADMIN — Medication SCH MLS/HR: at 03:47

## 2020-03-30 RX ADMIN — POTASSIUM CHLORIDE SCH MLS/HR: 200 INJECTION, SOLUTION INTRAVENOUS at 06:42

## 2020-03-30 RX ADMIN — Medication SCH MLS/HR: at 19:00

## 2020-03-30 RX ADMIN — IPRATROPIUM BROMIDE AND ALBUTEROL SULFATE SCH ML: .5; 3 SOLUTION RESPIRATORY (INHALATION) at 07:23

## 2020-03-30 RX ADMIN — MAGNESIUM SULFATE IN DEXTROSE SCH MLS/HR: 10 INJECTION, SOLUTION INTRAVENOUS at 05:49

## 2020-03-30 RX ADMIN — POTASSIUM CHLORIDE SCH MLS/HR: 200 INJECTION, SOLUTION INTRAVENOUS at 05:50

## 2020-03-30 RX ADMIN — DEXTROSE MONOHYDRATE, SODIUM CHLORIDE, AND POTASSIUM CHLORIDE SCH MLS/HR: 50; 4.5; 1.49 INJECTION, SOLUTION INTRAVENOUS at 19:23

## 2020-03-30 RX ADMIN — DOCUSATE SODIUM AND SENNOSIDES SCH EA: 8.6; 5 TABLET, FILM COATED ORAL at 10:21

## 2020-03-30 RX ADMIN — FOLIC ACID SCH MG: 1 TABLET ORAL at 10:21

## 2020-03-30 RX ADMIN — Medication SCH MG: at 09:16

## 2020-03-30 RX ADMIN — ENOXAPARIN SODIUM SCH MG: 100 INJECTION SUBCUTANEOUS at 05:50

## 2020-03-30 RX ADMIN — POTASSIUM CHLORIDE SCH MLS/HR: 200 INJECTION, SOLUTION INTRAVENOUS at 10:14

## 2020-03-30 RX ADMIN — DOCUSATE SODIUM AND SENNOSIDES SCH EA: 8.6; 5 TABLET, FILM COATED ORAL at 23:49

## 2020-03-30 RX ADMIN — SODIUM CHLORIDE SCH MLS/HR: 900 INJECTION, SOLUTION INTRAVENOUS at 05:49

## 2020-03-30 RX ADMIN — METOPROLOL TARTRATE SCH MG: 25 TABLET, FILM COATED ORAL at 09:16

## 2020-03-30 RX ADMIN — SODIUM CHLORIDE SCH MLS/HR: 900 INJECTION, SOLUTION INTRAVENOUS at 14:34

## 2020-03-30 RX ADMIN — AMLODIPINE BESYLATE SCH MG: 5 TABLET ORAL at 05:47

## 2020-03-30 RX ADMIN — METOPROLOL TARTRATE SCH MG: 25 TABLET, FILM COATED ORAL at 23:49

## 2020-03-30 RX ADMIN — SODIUM CHLORIDE SCH MLS/HR: 900 INJECTION, SOLUTION INTRAVENOUS at 05:50

## 2020-03-30 RX ADMIN — MAGNESIUM SULFATE IN DEXTROSE SCH MLS/HR: 10 INJECTION, SOLUTION INTRAVENOUS at 06:43

## 2020-03-30 RX ADMIN — Medication SCH MG: at 10:21

## 2020-03-30 RX ADMIN — DOCUSATE SODIUM AND SENNOSIDES SCH EA: 8.6; 5 TABLET, FILM COATED ORAL at 09:16

## 2020-03-30 RX ADMIN — Medication SCH MLS/HR: at 14:30

## 2020-03-30 RX ADMIN — PROPOFOL SCH MLS/HR: 10 INJECTION, EMULSION INTRAVENOUS at 01:51

## 2020-03-30 RX ADMIN — Medication SCH MLS/HR: at 09:10

## 2020-03-30 RX ADMIN — PANTOPRAZOLE SODIUM SCH MG: 40 TABLET, DELAYED RELEASE ORAL at 09:16

## 2020-03-30 RX ADMIN — POTASSIUM CHLORIDE SCH MLS/HR: 200 INJECTION, SOLUTION INTRAVENOUS at 08:08

## 2020-03-30 RX ADMIN — POTASSIUM CHLORIDE SCH MLS/HR: 200 INJECTION, SOLUTION INTRAVENOUS at 08:10

## 2020-03-30 NOTE — OCC THERAPY PROGRESS NOTE
Therapy Progress Note


Pt attempted to be seen at 1013. Pt asleep in bed. Nursing states pt not 

oriented nor following many directions. OT to check on pt at next available time

to initiate eval/ treat.











KEATON BANKS OTR                Mar 30, 2020 10:41

## 2020-03-30 NOTE — NUR
ADJUSTED ACYCLOVIR DOSE (DOSING ON ADJUSTED BODY WEIGHT) CHANGED DOSE TO 800MG IV Q8HR 
(DISCUSSED WITH DR VASQUEZ).

## 2020-03-30 NOTE — DIAGNOSTIC IMAGING REPORT
EXAMINATION: Chest radiograph, portable AP view.



DATE: 3/30/2020 3:57 AM hours.



INDICATION: 64-year-old male, bilateral pneumonia.



COMPARISON:  March 29, 2020.



FINDINGS: The endotracheal tube is approximately 6.4 cm above the

watson. The left-sided central venous line overlies the upper

SVC. Stable overall appearance of the cardiomediastinal

silhouette. The nasogastric tube appears to be positioned within

the stomach. There is no identified pneumothorax. There is no

large pleural effusion. There are streaky opacities in the left

perihilar region and left lung base. There is severe left

glenohumeral arthritis.



IMPRESSION: 

1. Streaky opacities in the left perihilar region and left lung

base which are unchanged. This may relate to atelectasis and/or

infiltrate.



Dictated by: 



  Dictated on workstation # [id number]

## 2020-03-30 NOTE — NUR
Patient awake on vent and following commands, RT called to bedside to extubate per Dr. Wang's orders. 

Patient's VSS. This RN will continue to monitor.
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
## 2020-03-30 NOTE — OCC THERAPY PROGRESS NOTE
Therapy Progress Note


Pt attempted to see at 1240 this afternoon. Per nursing, pt likely will not be 

participatory based on orientation/ difficulty following direction/ attitude; OT

to hold eval/ treat on this date and initiate when pt able to participate in 

skilled therapy tx.











KEATON BANKS OTR                Mar 30, 2020 12:49

## 2020-03-30 NOTE — PHYSICAL THERAPY EVALUATION
PT Evaluation-General


Medical Diagnosis


Admission Date


Mar 26, 2020 at 14:45


Medical Diagnosis:  pneumonia


Onset Date:  Mar 26, 2020





Therapy Diagnosis


Therapy Diagnosis:  generalized weakness/debility





Height/Weight


Height (Feet):  5


Height (Inches):  7.50


Weight (Pounds):  365


Weight (Ounces):  0





Precautions


Precautions/Isolations:  Fall Prevention, Standard Precautions, Pressure Ulcer





Referral


Physician:  Nelson


Reason for Referral:  Evaluation/Treatment





Medical History


Pertinent Medical History:  Alcoholism, HTN


Additional Medical History


oral tobacco use/morbid obesity/80# weight loss in 8 months


Current History


admit from urgent care due to confusion and SOA/non compliant with medical 

issues prior to this admit


Reviewed History:  Yes





Social History


patient is independent and works out side of home





Prior


Prior Level of Function


SCALE: Activities may be completed with or without assistive devices.





6-Indepedent-patient completes the activity by him/herself with no assistance 

from a helper.


5-Set-up or Clean-up Assistance-helper sets up or cleans up; patient completes 

activity. [place] assists only prior to or  


    following the activity.


4-Supervision or Touching Assistance-helper provides verbal cues and/or 

touching/steadying and/or contact guard assistance as patient completes 

activity. Assistance may be provided   


    throughout the activity or intermittently.


3-Partial/Moderate Assistance-helper does LESS THAN HALF the effort. [place] 

lifts, holds or supports trunk or limbs, but provides less than half the effort.


2-Substantial/Maximal Assistance-helper does MORE THAN HALF the effort. [place] 

lifts or holds trunk or limbs and provides more than half the effort.


7-Odtnpxdbk-jfnsho does ALL the effort. Patient does none of the effort to 

complete the activity. Or, the assistance of 2 or more helpers is required for 

the patient to complete the  


    activity.


If activity was not attempted, code reason:


7-Patient Refused.


9-Not Applicable-not attempted and the patient did not perform the activity 

before the current illness, exacerbation or injury.


10-Not Attempted due to Environmental Limitations-(lack of equipment, weather 

restraints, etc.).


88-Not Attempted due to Medical Conditions or Safety Concerns.


Bed Mobility:  6


Transfers (B,C,W/C):  6


Gait:  6


Indoor Mobility (Ambulation):  Independent


Prior Devices Use:  None


works out of home





PT Evaluation-Current


Subjective


Patient extubated this a.m.  Alert, confused.





Pain





   Numeric Pain Scale:  10-Worst Possible Pain


   Location:  Lower


   Location Body Site:  Back


   Comment:  FLACC





Objective


Patient Orientation:  Confused, Mumbles


Attachments:  SCD's, Oxygen, Hankins Catheter, IV





ROM/Strength


ROM Lower Extremities


bilateral LE WFL (noted morbid obesity)


Strength Lower Extremities


2/5 grossly bilateral LE (does show strength, however, unable to follow simple 

direction to formally perform testing/patient is very resistive)





Integumentary/Posture


Integumentary


refer to nursing notes


Bowel Incontinence:  Yes


Bladder Incontinence:  Hankins Cath





Neuromuscular


(Tone, Coordination, Reflexes)


diminished due to recent extubation





Sensory


Vision:  Unable to Assess


Hearing:  Functional


Sensation Right Lower Extremit:  Intact


Sensation Left Lower Extremity:  Intact





Transfers


Roll Left to Right (QC):  1


Sit to Lying (QC):  1


Lying to Sitting/Side of Bed(Q:  1


Sit to Stand (QC):  88


Chair/Bed-to-Chair Xfer(QC):  88


dependent assist x 2 with noted resistance with all mobility





Gait


Does the Patient Walk?:  No and Walking Goal IS indicated





Balance


Sitting Static:  Poor


Sitting Dynamic:  Poor





Assessment/Needs


64 y.o. male, will benefit from skilled PT to address functional strength and 

mobility to improve current LOF to safely return to home at maximum LOF.  

Patient extubated this a.m.


Rehab Potential:  Guarded





PT Short Term Goals


Short Term Goals


Time Frame:  Apr 4, 2020


Roll Left & Right:  3


Sit to lying:  3


Lying to sitting on side of be:  3


Sit to stand:  3


Chair/bed-to-chair transfer:  3


Walk 10 feet:  3





PT Long Term Goals


Long Term Goals


PT Long Term Goals Time Frame:  Apr 18, 2020


Roll Left & Right (QC):  6


Sit to Lying (QC):  6


Lying-Sitting on Side/Bed(QC):  6


Sit to Stand (QC):  6


Chair/Bed-to-Chair Xfer(QC):  6


Toilet Transfer (QC):  6


Does the Patient Walk:  Yes


Walk 10 feet (QC):  6


Walk 50ft with 2 Turns (QC):  6


Walk 150 ft (QC):  6


Walking 10ft on Uneven Surface:  6





PT Plan


Problem List


Problem List:  Activity Tolerance, Functional Strength, Safety, Balance, Gait, 

Transfer, Bed Mobility





Treatment/Plan


Treatment Plan:  Continue Plan of Care


Treatment Plan:  Bed Mobility, Education, Functional Activity Rosie, Functional 

Strength, Gait, Safety, Therapeutic Exercise, Transfers


Treatment Duration:  Apr 18, 2020


Frequency:  6 times per week


Estimated Hrs Per Day:  .25 hour per day





Time/GCodes


Time In:  836


Time Out:  850


Total Billed Treatment Time:  14


Total Billed Treatment


1 visit


[facility]











WILLIAM SAAVEDRA PT              Mar 30, 2020 10:09

## 2020-03-30 NOTE — PROGRESS NOTE - HOSPITALIST
Subjective


HPI/CC On Admission


Date Seen by Provider:  Mar 30, 2020


Time Seen by Provider:  07:40


Chief complaint: Altered mental status with profound weight loss with severe 

hypokalemia with leukocytosis





History of present illness: This is a 64-year-old -American male who was 

brought to the [facility] by his  at his company 

extrusions in [place] due to confusion and difficulty tracking and taking 

care of himself.  Apparently went to [place] ER yesterday for lower back pain

lumbar spine x-ray obtained showing no abnormality and was sent home with a 

potassium supplement because it was low but he was noncompliant with that 

prescription as he has been noncompliant with all of Dr. Romero recommendation 

since he sees him in his care van in [place] on a regular basis for hype

rtension management.  He also reports he is an oral tobacco user and he has been

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 

the weekends but does not touch it during the week because it could alter his 

job performance.  He is a .  He has never been  but 

engaged twice and has no children.  His sister was just discharged off my 

service today due to profound hypokalemia of 2.3 requiring aggressive IV 

supplementation.  She is a retired nurse of 35 years at [employer].  Urgent care ARNP called me and reported bilateral lower lobe pneumonia 

and in need of admission to the hospital. Labs and CXR were ordered and sepsis 

protocol followed. At this current time patient does have some problems turning 

in the bed because of left flank pain and I did order CT scans which showed an 

abnormality on the CT of the brain which requires an MRI scheduled for tomorrow 

and Dr. Fox was consulted and he had no evidence of the source of his 

profound weight loss or flank pain.  Urinalysis has been ordered.


Subjective/Events-last exam


He was just extubated this morning. His voice is weak. He is confused. He is 

oriented to self. He is sleepy.





Focused Exam


Lactate Level


3/29/20 12:00: Lactic Acid Level 1.21








Objective


Exam


Vital Signs





Vital Signs








  Date Time  Temp Pulse Resp B/P (MAP) Pulse Ox O2 Delivery O2 Flow Rate FiO2


 


3/30/20 16:00 35.9       


 


3/30/20 16:00  64  153/99 (117)  Room Air  


 


3/30/20 11:51     94   21


 


3/30/20 11:00   26     


 


3/30/20 06:00       21.00 





Capillary Refill : Less Than 3 Seconds


General Appearance:  No Apparent Distress, Obese


Respiratory:  Lungs Clear, Normal Breath Sounds, No Respiratory Distress


Cardiovascular:  Regular Rate, Rhythm, No Murmur


Gastrointestinal:  Soft


Extremity:  Normal Inspection, Non Tender, Pedal Edema


Neurologic/Psychiatric:  Other (lethargic, confused, oriented to self)


Skin:  Normal Color, Warm/Dry





Results/Procedures


Lab


Laboratory Tests


3/30/20 02:35








Patient resulted labs reviewed.





Assessment/Plan


Assessment and Plan


Assess & Plan/Chief Complaint


Acute respiratory failure with hypoxia


Acute metabolic encephalopathy


EtOH withdrawal


Pneumonia


-Extubated this morning


-Continue Zosyn and Azithromycin for pneumonia


-Continue Acyclovir for possible HSV encephalitis


-CSF viral panel pending


-Transitioned to Precedex


-CIWA protocol





Hypernatremia


Hypokalemia


-Transitioned to D5 1/2 NS


-Monitor and replace potassium as needed





Essential HTN


-Continue amlodipine and metoprolol


-Hydralazine PRN





Tobacco abuse


-Nicotine patch





DVT Prophylaxis: Lovenox





Diagnosis/Problems


Diagnosis/Problems





(1) Acute respiratory failure with hypoxia


Status:  Acute


(2) Acute metabolic encephalopathy


Status:  Acute


(3) Alcohol withdrawal


Status:  Acute


(4) Hypokalemia


Status:  Acute


(5) Hyponatremia


Status:  Acute


(6) Essential hypertension


Status:  Chronic


(7) Tobacco abuse


Status:  Chronic





Clinical Quality Measures


DVT/VTE Risk/Contraindication:


Risk Factor Score Per Nursin


RFS Level Per Nursing on Admit:  4+=Very High











KACY IVY MD              Mar 30, 2020 17:01

## 2020-03-31 VITALS — SYSTOLIC BLOOD PRESSURE: 147 MMHG | DIASTOLIC BLOOD PRESSURE: 100 MMHG

## 2020-03-31 VITALS — SYSTOLIC BLOOD PRESSURE: 170 MMHG | DIASTOLIC BLOOD PRESSURE: 110 MMHG

## 2020-03-31 VITALS — SYSTOLIC BLOOD PRESSURE: 198 MMHG | DIASTOLIC BLOOD PRESSURE: 137 MMHG

## 2020-03-31 VITALS — DIASTOLIC BLOOD PRESSURE: 104 MMHG | SYSTOLIC BLOOD PRESSURE: 184 MMHG

## 2020-03-31 VITALS — DIASTOLIC BLOOD PRESSURE: 79 MMHG | SYSTOLIC BLOOD PRESSURE: 120 MMHG

## 2020-03-31 VITALS — DIASTOLIC BLOOD PRESSURE: 112 MMHG | SYSTOLIC BLOOD PRESSURE: 175 MMHG

## 2020-03-31 VITALS — DIASTOLIC BLOOD PRESSURE: 126 MMHG | SYSTOLIC BLOOD PRESSURE: 179 MMHG

## 2020-03-31 VITALS — SYSTOLIC BLOOD PRESSURE: 120 MMHG | DIASTOLIC BLOOD PRESSURE: 78 MMHG

## 2020-03-31 VITALS — SYSTOLIC BLOOD PRESSURE: 177 MMHG | DIASTOLIC BLOOD PRESSURE: 108 MMHG

## 2020-03-31 VITALS — SYSTOLIC BLOOD PRESSURE: 137 MMHG | DIASTOLIC BLOOD PRESSURE: 85 MMHG

## 2020-03-31 VITALS — SYSTOLIC BLOOD PRESSURE: 162 MMHG | DIASTOLIC BLOOD PRESSURE: 113 MMHG

## 2020-03-31 VITALS — DIASTOLIC BLOOD PRESSURE: 100 MMHG | SYSTOLIC BLOOD PRESSURE: 152 MMHG

## 2020-03-31 VITALS — DIASTOLIC BLOOD PRESSURE: 113 MMHG | SYSTOLIC BLOOD PRESSURE: 171 MMHG

## 2020-03-31 VITALS — DIASTOLIC BLOOD PRESSURE: 116 MMHG | SYSTOLIC BLOOD PRESSURE: 187 MMHG

## 2020-03-31 VITALS — SYSTOLIC BLOOD PRESSURE: 140 MMHG | DIASTOLIC BLOOD PRESSURE: 103 MMHG

## 2020-03-31 VITALS — SYSTOLIC BLOOD PRESSURE: 149 MMHG | DIASTOLIC BLOOD PRESSURE: 85 MMHG

## 2020-03-31 VITALS — DIASTOLIC BLOOD PRESSURE: 98 MMHG | SYSTOLIC BLOOD PRESSURE: 145 MMHG

## 2020-03-31 VITALS — SYSTOLIC BLOOD PRESSURE: 181 MMHG | DIASTOLIC BLOOD PRESSURE: 137 MMHG

## 2020-03-31 VITALS — SYSTOLIC BLOOD PRESSURE: 176 MMHG | DIASTOLIC BLOOD PRESSURE: 122 MMHG

## 2020-03-31 VITALS — SYSTOLIC BLOOD PRESSURE: 146 MMHG | DIASTOLIC BLOOD PRESSURE: 95 MMHG

## 2020-03-31 VITALS — DIASTOLIC BLOOD PRESSURE: 116 MMHG | SYSTOLIC BLOOD PRESSURE: 160 MMHG

## 2020-03-31 VITALS — DIASTOLIC BLOOD PRESSURE: 88 MMHG | SYSTOLIC BLOOD PRESSURE: 129 MMHG

## 2020-03-31 VITALS — SYSTOLIC BLOOD PRESSURE: 162 MMHG | DIASTOLIC BLOOD PRESSURE: 117 MMHG

## 2020-03-31 VITALS — SYSTOLIC BLOOD PRESSURE: 166 MMHG | DIASTOLIC BLOOD PRESSURE: 118 MMHG

## 2020-03-31 LAB
BASOPHILS # BLD AUTO: 0 10^3/UL (ref 0–0.1)
BASOPHILS NFR BLD AUTO: 0 % (ref 0–10)
BUN/CREAT SERPL: 5
CALCIUM SERPL-MCNC: 8 MG/DL (ref 8.5–10.1)
CHLORIDE SERPL-SCNC: 115 MMOL/L (ref 98–107)
CO2 SERPL-SCNC: 20 MMOL/L (ref 21–32)
CREAT SERPL-MCNC: 0.84 MG/DL (ref 0.6–1.3)
EOSINOPHIL # BLD AUTO: 0.3 10^3/UL (ref 0–0.3)
EOSINOPHIL NFR BLD AUTO: 3 % (ref 0–10)
ERYTHROCYTE [DISTWIDTH] IN BLOOD BY AUTOMATED COUNT: 16.2 % (ref 10–14.5)
GFR SERPLBLD BASED ON 1.73 SQ M-ARVRAT: > 60 ML/MIN
GLUCOSE SERPL-MCNC: 125 MG/DL (ref 70–105)
HCT VFR BLD CALC: 32 % (ref 40–54)
HGB BLD-MCNC: 10.9 G/DL (ref 13.3–17.7)
LYMPHOCYTES # BLD AUTO: 1.3 X 10^3 (ref 1–4)
LYMPHOCYTES NFR BLD AUTO: 12 % (ref 12–44)
MAGNESIUM SERPL-MCNC: 2.1 MG/DL (ref 1.6–2.4)
MANUAL DIFFERENTIAL PERFORMED BLD QL: NO
MCH RBC QN AUTO: 34 PG (ref 25–34)
MCHC RBC AUTO-ENTMCNC: 34 G/DL (ref 32–36)
MCV RBC AUTO: 100 FL (ref 80–99)
MONOCYTES # BLD AUTO: 0.7 X 10^3 (ref 0–1)
MONOCYTES NFR BLD AUTO: 7 % (ref 0–12)
NEUTROPHILS # BLD AUTO: 8.1 X 10^3 (ref 1.8–7.8)
NEUTROPHILS NFR BLD AUTO: 77 % (ref 42–75)
PHOSPHATE SERPL-MCNC: 2.7 MG/DL (ref 2.3–4.7)
PLATELET # BLD: 218 10^3/UL (ref 130–400)
PMV BLD AUTO: 10.4 FL (ref 7.4–10.4)
POTASSIUM SERPL-SCNC: 3.5 MMOL/L (ref 3.6–5)
SODIUM SERPL-SCNC: 145 MMOL/L (ref 135–145)
WBC # BLD AUTO: 10.5 10^3/UL (ref 4.3–11)

## 2020-03-31 RX ADMIN — HYDRALAZINE HYDROCHLORIDE PRN MG: 20 INJECTION INTRAMUSCULAR; INTRAVENOUS at 08:42

## 2020-03-31 RX ADMIN — METOPROLOL TARTRATE SCH MG: 25 TABLET, FILM COATED ORAL at 21:53

## 2020-03-31 RX ADMIN — HYDRALAZINE HYDROCHLORIDE PRN MG: 20 INJECTION INTRAMUSCULAR; INTRAVENOUS at 21:54

## 2020-03-31 RX ADMIN — POTASSIUM CHLORIDE SCH MLS/HR: 200 INJECTION, SOLUTION INTRAVENOUS at 09:45

## 2020-03-31 RX ADMIN — AMLODIPINE BESYLATE SCH MG: 5 TABLET ORAL at 08:42

## 2020-03-31 RX ADMIN — POTASSIUM CHLORIDE SCH MEQ: 1500 TABLET, EXTENDED RELEASE ORAL at 06:06

## 2020-03-31 RX ADMIN — ZIPRASIDONE MESYLATE SCH MG: 20 INJECTION, POWDER, LYOPHILIZED, FOR SOLUTION INTRAMUSCULAR at 14:55

## 2020-03-31 RX ADMIN — POTASSIUM CHLORIDE SCH MLS/HR: 200 INJECTION, SOLUTION INTRAVENOUS at 06:32

## 2020-03-31 RX ADMIN — Medication SCH MG: at 09:13

## 2020-03-31 RX ADMIN — FOLIC ACID SCH MG: 1 TABLET ORAL at 08:41

## 2020-03-31 RX ADMIN — LORAZEPAM PRN MG: 2 INJECTION INTRAMUSCULAR; INTRAVENOUS at 12:04

## 2020-03-31 RX ADMIN — SODIUM CHLORIDE SCH MLS/HR: 900 INJECTION, SOLUTION INTRAVENOUS at 06:32

## 2020-03-31 RX ADMIN — SODIUM CHLORIDE SCH MLS/HR: 900 INJECTION, SOLUTION INTRAVENOUS at 15:23

## 2020-03-31 RX ADMIN — Medication SCH MLS/HR: at 00:42

## 2020-03-31 RX ADMIN — MAGNESIUM SULFATE IN DEXTROSE SCH MLS/HR: 10 INJECTION, SOLUTION INTRAVENOUS at 06:05

## 2020-03-31 RX ADMIN — ZIPRASIDONE MESYLATE SCH MG: 20 INJECTION, POWDER, LYOPHILIZED, FOR SOLUTION INTRAMUSCULAR at 21:00

## 2020-03-31 RX ADMIN — DEXTROSE MONOHYDRATE, SODIUM CHLORIDE, AND POTASSIUM CHLORIDE SCH MLS/HR: 50; 4.5; 1.49 INJECTION, SOLUTION INTRAVENOUS at 00:38

## 2020-03-31 RX ADMIN — HALOPERIDOL LACTATE PRN MG: 5 INJECTION, SOLUTION INTRAMUSCULAR at 10:39

## 2020-03-31 RX ADMIN — DEXTROSE MONOHYDRATE, SODIUM CHLORIDE, AND POTASSIUM CHLORIDE SCH MLS/HR: 50; 4.5; 1.49 INJECTION, SOLUTION INTRAVENOUS at 21:55

## 2020-03-31 RX ADMIN — PANTOPRAZOLE SODIUM SCH MG: 40 TABLET, DELAYED RELEASE ORAL at 09:12

## 2020-03-31 RX ADMIN — DEXTROSE MONOHYDRATE, SODIUM CHLORIDE, AND POTASSIUM CHLORIDE SCH MLS/HR: 50; 4.5; 1.49 INJECTION, SOLUTION INTRAVENOUS at 08:34

## 2020-03-31 RX ADMIN — IPRATROPIUM BROMIDE AND ALBUTEROL SULFATE SCH ML: .5; 3 SOLUTION RESPIRATORY (INHALATION) at 18:17

## 2020-03-31 RX ADMIN — POTASSIUM CHLORIDE SCH MLS/HR: 200 INJECTION, SOLUTION INTRAVENOUS at 07:33

## 2020-03-31 RX ADMIN — POTASSIUM CHLORIDE SCH MLS/HR: 200 INJECTION, SOLUTION INTRAVENOUS at 06:05

## 2020-03-31 RX ADMIN — DOCUSATE SODIUM AND SENNOSIDES SCH EA: 8.6; 5 TABLET, FILM COATED ORAL at 09:12

## 2020-03-31 RX ADMIN — POTASSIUM CHLORIDE SCH MLS/HR: 200 INJECTION, SOLUTION INTRAVENOUS at 08:35

## 2020-03-31 RX ADMIN — ENOXAPARIN SODIUM SCH MG: 100 INJECTION SUBCUTANEOUS at 06:33

## 2020-03-31 RX ADMIN — ASCORBIC ACID, VITAMIN A PALMITATE, CHOLECALCIFEROL, THIAMINE HYDROCHLORIDE, RIBOFLAVIN-5 PHOSPHATE SODIUM, PYRIDOXINE HYDROCHLORIDE, NIACINAMIDE, DEXPANTHENOL, ALPHA-TOCOPHEROL ACETATE, VITAMIN K1, FOLIC ACID, BIOTIN, CYANOCOBALAMIN SCH MG: 200; 3300; 200; 6; 3.6; 6; 40; 15; 10; 150; 600; 60; 5 INJECTION, SOLUTION INTRAVENOUS at 08:35

## 2020-03-31 RX ADMIN — ZIPRASIDONE MESYLATE SCH MG: 20 INJECTION, POWDER, LYOPHILIZED, FOR SOLUTION INTRAMUSCULAR at 11:13

## 2020-03-31 RX ADMIN — DEXMEDETOMIDINE HYDROCHLORIDE SCH MLS/HR: 100 INJECTION, SOLUTION, CONCENTRATE INTRAVENOUS at 03:33

## 2020-03-31 RX ADMIN — IPRATROPIUM BROMIDE AND ALBUTEROL SULFATE SCH ML: .5; 3 SOLUTION RESPIRATORY (INHALATION) at 09:32

## 2020-03-31 RX ADMIN — METOPROLOL TARTRATE SCH MG: 25 TABLET, FILM COATED ORAL at 08:42

## 2020-03-31 RX ADMIN — DEXTROSE MONOHYDRATE, SODIUM CHLORIDE, AND POTASSIUM CHLORIDE SCH MLS/HR: 50; 4.5; 1.49 INJECTION, SOLUTION INTRAVENOUS at 01:44

## 2020-03-31 RX ADMIN — DEXTROSE MONOHYDRATE, SODIUM CHLORIDE, AND POTASSIUM CHLORIDE SCH MLS/HR: 50; 4.5; 1.49 INJECTION, SOLUTION INTRAVENOUS at 15:50

## 2020-03-31 RX ADMIN — Medication SCH EA: at 06:07

## 2020-03-31 RX ADMIN — ENOXAPARIN SODIUM SCH MG: 100 INJECTION SUBCUTANEOUS at 19:07

## 2020-03-31 RX ADMIN — SODIUM CHLORIDE SCH MLS/HR: 900 INJECTION, SOLUTION INTRAVENOUS at 06:42

## 2020-03-31 RX ADMIN — SODIUM CHLORIDE SCH MLS/HR: 900 INJECTION, SOLUTION INTRAVENOUS at 21:54

## 2020-03-31 RX ADMIN — DOCUSATE SODIUM AND SENNOSIDES SCH EA: 8.6; 5 TABLET, FILM COATED ORAL at 21:53

## 2020-03-31 RX ADMIN — SODIUM CHLORIDE SCH MLS/HR: 900 INJECTION, SOLUTION INTRAVENOUS at 15:18

## 2020-03-31 NOTE — OCCUPATIONAL THERAPY EVAL
OT Evaluation-General/PLF


Medical Diagnosis


Admission Date


Mar 26, 2020 at 14:45


Medical Diagnosis:  pneumonia


Onset Date:  Mar 26, 2020





Therapy Diagnosis


Therapy Diagnosis:  Decreased ADL function





Height/Weight


Height (Feet):  5


Height (Inches):  7.50


Weight (Pounds):  365


Weight (Ounces):  0





Precautions


Precautions/Isolations:  Fall Prevention, Standard Precautions


Safety Interventions:  Bed Exit Alarm





Referral


Physician:  Nelson


Referral Reason:  Activity Tolerance, Self Care, Evaluation/Treatment, 

Strengthening/ROM





Medical History


Pertinent Medical History:  Alcoholism, HTN


Current History


Dx of PNA and L flank pain, intubated 3/27/20, extubated 3/30- hold therapy on 

that day due to decreased cognitive awareness. 


Initiation of eval/ treat 3/31- pt continues to have aggressive behaviors and 

decreased cognition


Reviewed History:  Yes





Social History


Current Living Status:  Alone


Pt unable to give much hx, states he lives alone and has brother that contacts 

him





ADL-Prior Level of Function


SCALE: Activities may be completed with or without assistive devices.





6-Indepedent-patient completes the activity by him/herself with no assistance 

from a helper.


5-Set-up or Clean-up Assistance-helper sets up or cleans up; patient completes 

activity. [place] assists only prior to or  


    following the activity.


4-Supervision or Touching Assistance-helper provides verbal cues and/or 

touching/steadying and/or contact guard assistance as patient completes 

activity. Assistance may be provided   


    throughout the activity or intermittently.


3-Partial/Moderate Assistance-helper does LESS THAN HALF the effort. [place] 

lifts, holds or supports trunk or limbs, but provides less than half the effort.


2-Substantial/Maximal Assistance-helper does MORE THAN HALF the effort. [place] 

lifts or holds trunk or limbs and provides more than half the effort.


2-Plywpfsaw-jllfdy does ALL the effort. Patient does none of the effort to 

complete the activity. Or, the assistance of 2 or more helpers is required for 

the patient to complete the  


    activity.


If activity was not attempted, code reason:


7-Patient Refused.


9-Not Applicable-not attempted and the patient did not perform the activity 

before the current illness, exacerbation or injury.


10-Not Attempted due to Environmental Limitations-(lack of equipment, weather 

restraints, etc.).


88-Not Attempted due to Medical Conditions or Safety Concerns.


ADL PLOF Comments


Based on pt's work position and pt states no assist at home, pt was IND prior to

hospitalization.


Self Care:  Independent


Functional Cognition:  Independent


Occupation:  


Drive Self:  Yes


Leisure Interests:  fishing/ hunting/ country cruising.





OT Current Status


Subjective


Nursing agrees to modified tx due to pt's cognitive abilities. Pt seen in bed, 

nursing present. Pt's LE's off side of bed. Pt oriented to person only.





Mental Status/Objective


Patient Orientation:  Person


Attachments:  Hankins Catheter, IV, Telemetry





Current


Hearing Aids:  No


Dentures/Partials:  No


Hand Dominance:  Right


Upper Extremity ROM


unable to test fully due to pt's decrease in direction following/ attention


Upper Extremity Coordination


unable to test due to pt's decrease in direction following/ attention


Upper Extremity Sensation


WFL based on pt's function


Upper Extremity Strength


Decreased based on attempt to pull self up in bed, attempt to get out of bed and

reach for items.





ADL-Treatment


Eating (QC):  88 (pt NPO)


Oral Hygiene (QC):  88


Shower/Bathe Self (QC):  1


Upper Body Dressing (QC):  1


Lower Body Dressing (QC):  1


On/Off Footwear (QC):  1


Toileting Hygiene (QC):  1


based on clinical judgement and ability to follow directions pt is TD with all 

ADL tasks at this time.





Other Treatments


Pt seen in bed. Pt introduced/ oriented to person only. Pt states, "I love you."

Pt reoriented to person/ place/ situation. Pt mumbles during session. Pt able to

state job and where lives/ alone- pt unable to follow directions for MMT/ ROM 

screen. Pt begins attempting to sit up in bed. Pt agrees for repositioning. Pt 

rolls to R side with min A and pillow placed under L back. Pt agrees he would 

like to sit further toward HOB for comfort- nursing aide notified. Upon reentry 

pt is attempting to sit EOB, bed alarm going off. Nursing is present, along with

nursing assistant. Pt desires OOB, pt is attempted to be calmed and return to 

bed. Pt educated on OT role and goals of strengthening for safe sitting/ 

standing/ return home. Nursing administrates medication and pt repositioned to 

comfort, warm blankets placed. Pt left in room with nursing and call light 

placed on pt's blanket.





Education


OT Patient Education:  Correct positioning, Safety issues, Transfer techniques


Teaching Recipient:  Patient


Teaching Methods:  Demonstration, Discussion


Response to Teaching:  Unable to Return Demonstration, Unable to Comprehend, 

Reinforcement Needed





OT Long Term Goals


Long Term Goals


Time Frame:  Apr 7, 2020


Eating (QC):  5


Oral Hygiene (QC):  5


Toileting Hygiene (QC):  3


Shower/Bathe Self (QC):  3


Upper Body Dressing (QC):  5


Lower Body Dressing (QC):  3


On/Off Footwear (QC):  3


Additional Goals:  1-Demonstrate ADL Tasks, 2-Verbalize Understanding, 

3-ImproveStrength/Rosie


1=Demonstrate adherence to instructed precautions during ADL tasks.


2=Patient will verbalize/demonstrate understanding of assistive 

devices/modifications for ADL.


3=Patient will improve strength/tolerance for activity to enable patient to 

perform ADL's.





OT Education/Plan


Problem List/Assessment


Assessment:  Decreased Activ Tolerance, Decreased Safety Aware, Decreased UE 

Strength, Dependent Transfers, Impaired Bed Mobility, Impaired Cognition, 

Impaired Funct Balance, Impaired I ADL's, Impaired Self-Care Skills





Discharge Recommendations


Plan/Recommendations:  Continue POC


Therapy Discharge Recommendati:  24 Hour Supervision, Post Acute OT





Treatment Plan/Plan of Care


Treatment,Training & Education:  Yes


Patient would benefit from OT for education, treatment and training to promote 

independence in ADL's, mobility, safety and/or upper extremity function for 

ADL's.


Plan of Care:  ADL Retraining, Caregiver Training, Cognitive Retraining, 

Functional Mobility, UE Funct Exercise/Act, W/C Management Training


Treatment Duration:  Apr 7, 2020


Frequency:  5 times per week


Estimated Hrs Per Day:  .25 hour per day


Agreement:  Yes


Rehab Potential:  Guarded





Time/GCodes


Start Time:  10:22


Stop Time:  10:49


Total Time Billed (hr/min):  27


Billed Treatment Time


1, EVM (10), FA (17)= 27











KEATON BANKS OTR                Mar 31, 2020 11:26

## 2020-03-31 NOTE — DIAGNOSTIC IMAGING REPORT
INDICATION: Bilateral pneumonia



COMPARISON: 03/30/2020



TECHNIQUE: Single frontal radiograph of the chest dated

03/31/2020.



FINDINGS: Left-sided central venous line is stable. Previously

noted endotracheal tube and enteric catheters been removed. The

cardiac silhouette is enlarged, though stable. Improved aeration

of the lungs with improved perihilar opacities. No new focal

pulmonary opacity. No pleural effusion. No pneumothorax. No acute

osseous abnormality.



IMPRESSION:

 Interval extubation and removal of enteric catheter.



Improved aeration of the lungs with improved minimal perihilar

opacities.



Dictated by: 



  Dictated on workstation # [id number]

## 2020-03-31 NOTE — OCC THERAPY PROGRESS NOTE
Therapy Progress Note


Nursing stated pt okay to be seen by therapy on this date. OT attempted 

evaluation with pt laying in bed. Pt opened his eyes slightly to his name but 

then closed his eyes again. He did not respond to any questions OT asked/did not

follow any simple commands. Pt unable to participate in skilled therapy at this 

time. OT will attempt again later today.





1, visit


0830











MIKE JI OT          Mar 31, 2020 08:40

## 2020-03-31 NOTE — PROGRESS NOTE - HOSPITALIST
Subjective


HPI/CC On Admission


Date Seen by Provider:  Mar 31, 2020


Time Seen by Provider:  08:20


Chief complaint: Altered mental status with profound weight loss with severe 

hypokalemia with leukocytosis





History of present illness: This is a 64-year-old -American male who was 

brought to the [facility] by his  at his company 

extrusions in [place] due to confusion and difficulty tracking and taking 

care of himself.  Apparently went to [place] ER yesterday for lower back pain

lumbar spine x-ray obtained showing no abnormality and was sent home with a 

potassium supplement because it was low but he was noncompliant with that 

prescription as he has been noncompliant with all of Dr. Romero recommendation 

since he sees him in his care van in [place] on a regular basis for hype

rtension management.  He also reports he is an oral tobacco user and he has been

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 

the weekends but does not touch it during the week because it could alter his 

job performance.  He is a .  He has never been  but 

engaged twice and has no children.  His sister was just discharged off my 

service today due to profound hypokalemia of 2.3 requiring aggressive IV 

supplementation.  She is a retired nurse of 35 years at [employer].  Urgent care ARNP called me and reported bilateral lower lobe pneumonia 

and in need of admission to the hospital. Labs and CXR were ordered and sepsis 

protocol followed. At this current time patient does have some problems turning 

in the bed because of left flank pain and I did order CT scans which showed an 

abnormality on the CT of the brain which requires an MRI scheduled for tomorrow 

and Dr. Fox was consulted and he had no evidence of the source of his 

profound weight loss or flank pain.  Urinalysis has been ordered.


Subjective/Events-last exam


He is more responsive this morning than yesterday.  He is oriented to person, 

place, and situation.  He is reporting pain in his legs with movement.





Focused Exam


Lactate Level


3/29/20 12:00: Lactic Acid Level 1.21








Objective


Exam


Vital Signs





Vital Signs








  Date Time  Temp Pulse Resp B/P (MAP) Pulse Ox O2 Delivery O2 Flow Rate FiO2


 


3/31/20 11:00  106 14 181/137 (152)  Room Air  


 


3/31/20 09:33     98   


 


3/30/20 20:00 35.9       


 


3/30/20 11:51        21


 


3/30/20 06:00       21.00 





Capillary Refill : Less Than 3 Seconds


General Appearance:  No Apparent Distress, Obese


Respiratory:  Lungs Clear, Normal Breath Sounds, No Respiratory Distress


Cardiovascular:  Regular Rate, Rhythm, No Murmur


Gastrointestinal:  Normal Bowel Sounds, Non Tender, Soft


Extremity:  Normal Inspection, Non Tender, Pedal Edema


Neurologic/Psychiatric:  Disoriented, Motor Weakness, Other (Lethargic)


Skin:  Normal Color, Warm/Dry





Results/Procedures


Lab


Laboratory Tests


3/31/20 03:30








Patient resulted labs reviewed.





Assessment/Plan


Assessment and Plan


Assess & Plan/Chief Complaint


Acute respiratory failure with hypoxia


Acute metabolic encephalopathy


Pneumonia


Agitation


-Continue Zosyn and Azithromycin for pneumonia


-CSF viral panel indicative of past HSV infection, HSV I/II IgM pending


-Continue Acyclovir for possible HSV encephalitis


-Precedex discontinued


-CIWA protocol discontinued


-Started on Geodon


-Ativan as needed





Hypernatremia


Hypokalemia


-Continue D5 1/2 NS


-Monitor and replace potassium as needed





Essential HTN


-Continue amlodipine and metoprolol


-Hydralazine PRN





Tobacco abuse


-Nicotine patch





Morbid obesity


-Clinically significant, no acute management needs





Critical illness myopathy


-PT/OT


- assisting with placement





DVT Prophylaxis: Lovenox





EtOH withdrawal, resolved





Diagnosis/Problems


Diagnosis/Problems





(1) Acute respiratory failure with hypoxia


Status:  Acute


(2) Acute metabolic encephalopathy


Status:  Acute


(3) Alcohol withdrawal


Status:  Resolved


Resolution Date/Time:  3/31/20 @ 11:30


(4) Hypokalemia


Status:  Acute


(5) Hyponatremia


Status:  Acute


(6) Essential hypertension


Status:  Chronic


(7) Tobacco abuse


Status:  Chronic





Clinical Quality Measures


DVT/VTE Risk/Contraindication:


Risk Factor Score Per Nursin


RFS Level Per Nursing on Admit:  4+=Very High











KACY IVY MD              Mar 31, 2020 11:31

## 2020-03-31 NOTE — PHYSICAL THERAPY DAILY NOTE
PT Daily Note-Current


Subjective


Patient severely lethargic, however, does respond to verbal and painful stimuli.





Pain





   Numeric Pain Scale:  10-Worst Possible Pain


   Location:  Right, Left


   Location Body Site:  Generalized


   Comment:  FLACC bilateral LE's





Mental Status


Patient Orientation:  Confused, Mumbles, Listless


Attachments:  Oxygen, Hankins Catheter, IV





Transfers


SCALE: Activities may be completed with or without assistive devices.





6-Indepedent-patient completes the activity by him/herself with no assistance 

from a helper.


5-Set-up or Clean-up Assistance-helper sets up or cleans up; patient completes 

activity. [place] assists only prior to or  


    following the activity.


4-Supervision or Touching Assistance-helper provides verbal cues and/or 

touching/steadying and/or contact guard assistance as patient completes 

activity. Assistance may be provided   


    throughout the activity or intermittently.


3-Partial/Moderate Assistance-helper does LESS THAN HALF the effort. [place] 

lifts, holds or supports trunk or limbs, but provides less than half the effort.


2-Substantial/Maximal Assistance-helper does MORE THAN HALF the effort. [place] 

lifts or holds trunk or limbs and provides more than half the effort.


5-Enogcdewb-jpbrvd does ALL the effort. Patient does none of the effort to compl

ete the activity. Or, the assistance of 2 or more helpers is required for the 

patient to complete the  


    activity.


If activity was not attempted, code reason:


7-Patient Refused.


9-Not Applicable-not attempted and the patient did not perform the activity 

before the current illness, exacerbation or injury.


10-Not Attempted due to Environmental Limitations-(lack of equipment, weather 

restraints, etc.).


88-Not Attempted due to Medical Conditions or Safety Concerns.





Exercises


Supine Ex:  Ankle pumps, Heel Slides, Straight leg raise, Hip abd/add


Supine Reps:  15 (PROM with patient resistive due to increase c/o pain with ROM)





Assessment


Physician and RN present during part of treatment and witnessed patient response

to PROM bilateral LE.  PT to increase activity as patient becomes able to 

actively participate with skilled therapy.





PT Short Term Goals


Short Term Goals


Time Frame:  Apr 4, 2020


Roll Left & Right:  3


Sit to lying:  3


Lying to sitting on side of be:  3


Sit to stand:  3


Chair/bed-to-chair transfer:  3


Walk 10 feet:  3





PT Long Term Goals


Long Term Goals


PT Long Term Goals Time Frame:  Apr 18, 2020


Roll Left & Right (QC):  6


Sit to Lying (QC):  6


Lying-Sitting on Side/Bed(QC):  6


Sit to Stand (QC):  6


Chair/Bed-to-Chair Xfer(QC):  6


Toilet Transfer (QC):  6


Does the Patient Walk:  Yes


Walk 10 feet (QC):  6


Walk 50ft with 2 Turns (QC):  6


Walk 150 ft (QC):  6


Walking 10ft on Uneven Surface:  6





PT Plan


Treatment/Plan


Treatment Plan:  Continue Plan of Care


Treatment Plan:  Bed Mobility, Education, Functional Activity Rosie, Functional 

Strength, Gait, Safety, Therapeutic Exercise, Transfers


Treatment Duration:  Apr 18, 2020


Frequency:  6 times per week


Estimated Hrs Per Day:  .25 hour per day





Time/GCodes


Time In:  805


Time Out:  829


Total Billed Treatment Time:  24


Total Billed Treatment


1 visit


EX x 2 24 min











WILLIAM SAAVEDRA PT              Mar 31, 2020 09:12

## 2020-03-31 NOTE — PULMONARY PROGRESS NOTE
Subjective


Time Seen by a Provider:  04:57


Subjective/Events-last exam


pt is doing well off vent. He has been combative threatening RN.





Sepsis Event


Evaluation


Height, Weight, BMI


Height: 5'7.50"


Weight: 365lbs. 0oz. 165.283985zy; 42.63 BMI


Method:Estimated





Focused Exam


Lactate Level


3/29/20 12:00: Lactic Acid Level 1.21





Exam


Exam





Vital Signs








  Date Time  Temp Pulse Resp B/P (MAP) Pulse Ox O2 Delivery O2 Flow Rate FiO2


 


3/31/20 04:00  59 27 140/103 (115) 99 Room Air  


 


3/31/20 03:33  58  145/98    


 


3/31/20 03:00  58 16 145/98 (114) 98 Room Air  


 


3/31/20 02:00  60 16 120/78 (92) 97 Room Air  


 


3/31/20 01:17  67      


 


3/31/20 01:00  72 22 146/95 (112)  Room Air  


 


3/31/20 00:00      Room Air  


 


3/31/20 00:00  61 17 152/100 (117) 97 Room Air  


 


3/30/20 23:00  61 17 136/94 (108) 98 Room Air  


 


3/30/20 22:00  62 20 143/96 (112) 98 Room Air  


 


3/30/20 21:00  61 16 118/73 (88) 97 Room Air  


 


3/30/20 20:00 35.9       


 


3/30/20 20:00      Room Air  


 


3/30/20 20:00  64 21 140/95 (110) 97 Room Air  


 


3/30/20 19:34  64  146/98    


 


3/30/20 19:00  61 18 155/105 (122) 100 Room Air  


 


3/30/20 19:00  61      


 


3/30/20 18:00  64 25 146/98 (114) 100 Room Air  


 


3/30/20 17:00  62 23 145/94 (111) 100 Room Air  


 


3/30/20 16:00 35.9       


 


3/30/20 16:00  64  153/99 (117)  Room Air  


 


3/30/20 16:00      Room Air  


 


3/30/20 15:00  63  139/96 (110)  Room Air  


 


3/30/20 14:00  65  135/95 (108)  Room Air  


 


3/30/20 13:00  67  138/92 (107)  Room Air  


 


3/30/20 12:28  72      


 


3/30/20 12:00      Room Air  


 


3/30/20 12:00  66  148/100 (116)  Room Air  


 


3/30/20 11:51 35.8 67   94   21


 


3/30/20 11:16 35.8       


 


3/30/20 11:00  73 26 159/105 (123)  Room Air  


 


3/30/20 10:00  80 19 156/99 (118) 98 Room Air  


 


3/30/20 09:09    156/112    


 


3/30/20 09:00  87 23 156/112 (127) 100 Room Air  


 


3/30/20 08:00      Room Air  


 


3/30/20 08:00  83 34 149/90 (109) 98 Room Air  


 


3/30/20 07:24     95 Room Air  


 


3/30/20 07:12 36.3       


 


3/30/20 07:00  84 16 156/81 (106) 94 Room Air  


 


3/30/20 07:00  86      


 


3/30/20 06:30  76 24 144/109 (121) 96 Room Air  


 


3/30/20 06:00  69 16 143/89 (107) 97 Mechanical Ventilator 21.00 


 


3/30/20 05:00  73 15 151/99 (116) 96 Mechanical Ventilator 21.00 














I & O 


 


 3/31/20





 07:00


 


Intake Total 2936 ml


 


Output Total 640 ml


 


Balance 2296 ml








Height & Weight


Height: 5'7.50"


Weight: 365lbs. 0oz. 165.355010nf; 42.63 BMI


Method:Estimated


General Appearance:  No Apparent Distress, Obese


HEENT:  PERRL/EOMI, Moist Mucous Membranes


Respiratory:  Lungs Clear, Normal Breath Sounds, No Respiratory Distress


Cardiovascular:  Regular Rate, Rhythm, No Murmur


Capillary Refill:  Less Than 3 Seconds


Gastrointestinal:  non tender, soft, no organomegaly, no pulsatile mass, hernia 

(??small UH)


Extremity:  Normal Inspection, Non Tender, Pedal Edema


Neurologic/Psychiatric:  Other (lethargic, confused, oriented to self)


Skin:  Normal Color, Warm/Dry





Results


Lab


Laboratory Tests


3/30/20 02:35








3/31/20 03:30











Assessment/Plan


Assessment/Plan


Acute respiratory failure 


   -resolved 


   -D/c Precedex gtt 


Psychosis - aggressive behavior 


   -Pt is threatening to kill RN 


   -Will start Scheduled Geodon IM 


   -PRN Ativan, morphine 


   -Titrate Precedex gtt off. 


PNA and r/o meningitis


   -Currently on Zosyn, azithromycin, and acylovir 


   -Cultures neg thus far


   -MRSA neg  


   -HSV ab pending 


Alcohol dependance 


   -CIWA -- D/C 


    -Ativan PRN for agitation 


Hypokalemia 


   -Replace 


Hypernatremia


   -Change to 1/2 NS with D5 and 20meq of KCL at 150 


HTN 


   -Change lopressor to per OG 


   -PRN hydralazine 


   -Increase Norvasc to 10mg daily 


      -Give OG meds now 


Severe dehydration 





80lb wt loss over last 8 mo 


metabolic encephalopathy s/p lumbar puncture 


GI/DVT ppx











MIGUEL VASQUEZ DO              Mar 31, 2020 05:02

## 2020-03-31 NOTE — NUR
I continue to monitor this pt and am available as needed.  I interacted with the pt upon his 
admission, and made him aware of our availably.

## 2020-03-31 NOTE — NUR
CM/SS visited with the patient for discharge planning. 



The patient was awake and able to talk but kept pulling the blankets over his head during 
the conversation. CM/SS discussed with the patient the possibility of going to a skilled 
nursing facility to continue therapies for a longer duration. However, the patient was 
falling asleep while this SS was talking and appeared to not understand fully. CM/SS 
explained the patient preference form. CM/SS will discuss this with the patient again when 
he is more able to understand. 



The patient verbalized that he is currently living alone and is employed. He reports that he 
is working from home but could not give a company name. This information differs from 
previous statements made by the patient. CM/SS will follow up on this. Will continue to 
follow.

## 2020-04-01 VITALS — SYSTOLIC BLOOD PRESSURE: 168 MMHG | DIASTOLIC BLOOD PRESSURE: 99 MMHG

## 2020-04-01 VITALS — SYSTOLIC BLOOD PRESSURE: 178 MMHG | DIASTOLIC BLOOD PRESSURE: 128 MMHG

## 2020-04-01 VITALS — DIASTOLIC BLOOD PRESSURE: 92 MMHG | SYSTOLIC BLOOD PRESSURE: 129 MMHG

## 2020-04-01 VITALS — SYSTOLIC BLOOD PRESSURE: 156 MMHG | DIASTOLIC BLOOD PRESSURE: 95 MMHG

## 2020-04-01 VITALS — SYSTOLIC BLOOD PRESSURE: 166 MMHG | DIASTOLIC BLOOD PRESSURE: 98 MMHG

## 2020-04-01 VITALS — SYSTOLIC BLOOD PRESSURE: 131 MMHG | DIASTOLIC BLOOD PRESSURE: 69 MMHG

## 2020-04-01 VITALS — SYSTOLIC BLOOD PRESSURE: 159 MMHG | DIASTOLIC BLOOD PRESSURE: 114 MMHG

## 2020-04-01 VITALS — SYSTOLIC BLOOD PRESSURE: 162 MMHG | DIASTOLIC BLOOD PRESSURE: 128 MMHG

## 2020-04-01 VITALS — DIASTOLIC BLOOD PRESSURE: 71 MMHG | SYSTOLIC BLOOD PRESSURE: 118 MMHG

## 2020-04-01 VITALS — DIASTOLIC BLOOD PRESSURE: 93 MMHG | SYSTOLIC BLOOD PRESSURE: 157 MMHG

## 2020-04-01 VITALS — DIASTOLIC BLOOD PRESSURE: 102 MMHG | SYSTOLIC BLOOD PRESSURE: 146 MMHG

## 2020-04-01 VITALS — DIASTOLIC BLOOD PRESSURE: 90 MMHG | SYSTOLIC BLOOD PRESSURE: 151 MMHG

## 2020-04-01 VITALS — SYSTOLIC BLOOD PRESSURE: 144 MMHG | DIASTOLIC BLOOD PRESSURE: 83 MMHG

## 2020-04-01 VITALS — DIASTOLIC BLOOD PRESSURE: 101 MMHG | SYSTOLIC BLOOD PRESSURE: 141 MMHG

## 2020-04-01 VITALS — DIASTOLIC BLOOD PRESSURE: 78 MMHG | SYSTOLIC BLOOD PRESSURE: 132 MMHG

## 2020-04-01 VITALS — SYSTOLIC BLOOD PRESSURE: 162 MMHG | DIASTOLIC BLOOD PRESSURE: 91 MMHG

## 2020-04-01 LAB
BASOPHILS # BLD AUTO: 0 10^3/UL (ref 0–0.1)
BASOPHILS NFR BLD AUTO: 0 % (ref 0–10)
BUN/CREAT SERPL: 4
CALCIUM SERPL-MCNC: 9.1 MG/DL (ref 8.5–10.1)
CHLORIDE SERPL-SCNC: 117 MMOL/L (ref 98–107)
CO2 SERPL-SCNC: 19 MMOL/L (ref 21–32)
CREAT SERPL-MCNC: 0.78 MG/DL (ref 0.6–1.3)
EOSINOPHIL # BLD AUTO: 0.4 10^3/UL (ref 0–0.3)
EOSINOPHIL NFR BLD AUTO: 4 % (ref 0–10)
ERYTHROCYTE [DISTWIDTH] IN BLOOD BY AUTOMATED COUNT: 15.8 % (ref 10–14.5)
GFR SERPLBLD BASED ON 1.73 SQ M-ARVRAT: > 60 ML/MIN
GLUCOSE SERPL-MCNC: 97 MG/DL (ref 70–105)
HCT VFR BLD CALC: 37 % (ref 40–54)
HGB BLD-MCNC: 12.7 G/DL (ref 13.3–17.7)
LYMPHOCYTES # BLD AUTO: 1.2 X 10^3 (ref 1–4)
LYMPHOCYTES NFR BLD AUTO: 11 % (ref 12–44)
MAGNESIUM SERPL-MCNC: 1.9 MG/DL (ref 1.6–2.4)
MANUAL DIFFERENTIAL PERFORMED BLD QL: NO
MCH RBC QN AUTO: 34 PG (ref 25–34)
MCHC RBC AUTO-ENTMCNC: 35 G/DL (ref 32–36)
MCV RBC AUTO: 97 FL (ref 80–99)
MONOCYTES # BLD AUTO: 0.7 X 10^3 (ref 0–1)
MONOCYTES NFR BLD AUTO: 7 % (ref 0–12)
NEUTROPHILS # BLD AUTO: 8.4 X 10^3 (ref 1.8–7.8)
NEUTROPHILS NFR BLD AUTO: 78 % (ref 42–75)
PHOSPHATE SERPL-MCNC: 2.7 MG/DL (ref 2.3–4.7)
PLATELET # BLD: 264 10^3/UL (ref 130–400)
PMV BLD AUTO: 10.2 FL (ref 7.4–10.4)
POTASSIUM SERPL-SCNC: 3.8 MMOL/L (ref 3.6–5)
SODIUM SERPL-SCNC: 146 MMOL/L (ref 135–145)
WBC # BLD AUTO: 10.7 10^3/UL (ref 4.3–11)

## 2020-04-01 RX ADMIN — DEXTROSE MONOHYDRATE, SODIUM CHLORIDE, AND POTASSIUM CHLORIDE SCH MLS/HR: 50; 4.5; 1.49 INJECTION, SOLUTION INTRAVENOUS at 06:43

## 2020-04-01 RX ADMIN — SODIUM CHLORIDE SCH MLS/HR: 900 INJECTION, SOLUTION INTRAVENOUS at 06:32

## 2020-04-01 RX ADMIN — ZIPRASIDONE MESYLATE SCH MG: 20 INJECTION, POWDER, LYOPHILIZED, FOR SOLUTION INTRAMUSCULAR at 12:47

## 2020-04-01 RX ADMIN — LORAZEPAM PRN MG: 2 INJECTION INTRAMUSCULAR; INTRAVENOUS at 06:32

## 2020-04-01 RX ADMIN — METOPROLOL TARTRATE SCH MG: 1 INJECTION, SOLUTION INTRAVENOUS at 06:41

## 2020-04-01 RX ADMIN — METOPROLOL TARTRATE SCH MG: 1 INJECTION, SOLUTION INTRAVENOUS at 01:23

## 2020-04-01 RX ADMIN — ZIPRASIDONE MESYLATE SCH MG: 20 INJECTION, POWDER, LYOPHILIZED, FOR SOLUTION INTRAMUSCULAR at 09:29

## 2020-04-01 RX ADMIN — ENOXAPARIN SODIUM SCH MG: 100 INJECTION SUBCUTANEOUS at 05:59

## 2020-04-01 RX ADMIN — METOPROLOL TARTRATE SCH MG: 1 INJECTION, SOLUTION INTRAVENOUS at 12:47

## 2020-04-01 RX ADMIN — AMLODIPINE BESYLATE SCH MG: 5 TABLET ORAL at 09:29

## 2020-04-01 RX ADMIN — ASCORBIC ACID, VITAMIN A PALMITATE, CHOLECALCIFEROL, THIAMINE HYDROCHLORIDE, RIBOFLAVIN-5 PHOSPHATE SODIUM, PYRIDOXINE HYDROCHLORIDE, NIACINAMIDE, DEXPANTHENOL, ALPHA-TOCOPHEROL ACETATE, VITAMIN K1, FOLIC ACID, BIOTIN, CYANOCOBALAMIN SCH MG: 200; 3300; 200; 6; 3.6; 6; 40; 15; 10; 150; 600; 60; 5 INJECTION, SOLUTION INTRAVENOUS at 09:29

## 2020-04-01 RX ADMIN — PANTOPRAZOLE SODIUM SCH MG: 40 TABLET, DELAYED RELEASE ORAL at 09:30

## 2020-04-01 RX ADMIN — HYDRALAZINE HYDROCHLORIDE PRN MG: 20 INJECTION INTRAMUSCULAR; INTRAVENOUS at 01:32

## 2020-04-01 RX ADMIN — Medication SCH EA: at 06:41

## 2020-04-01 RX ADMIN — DOCUSATE SODIUM AND SENNOSIDES SCH EA: 8.6; 5 TABLET, FILM COATED ORAL at 09:30

## 2020-04-01 RX ADMIN — LORAZEPAM PRN MG: 2 INJECTION INTRAMUSCULAR; INTRAVENOUS at 15:42

## 2020-04-01 RX ADMIN — FOLIC ACID SCH MG: 1 TABLET ORAL at 09:30

## 2020-04-01 RX ADMIN — HYDRALAZINE HYDROCHLORIDE PRN MG: 20 INJECTION INTRAMUSCULAR; INTRAVENOUS at 15:42

## 2020-04-01 RX ADMIN — Medication SCH MG: at 09:29

## 2020-04-01 RX ADMIN — MAGNESIUM SULFATE IN DEXTROSE SCH MLS/HR: 10 INJECTION, SOLUTION INTRAVENOUS at 03:57

## 2020-04-01 RX ADMIN — POTASSIUM CHLORIDE SCH MLS/HR: 200 INJECTION, SOLUTION INTRAVENOUS at 03:57

## 2020-04-01 RX ADMIN — POTASSIUM CHLORIDE SCH MEQ: 1500 TABLET, EXTENDED RELEASE ORAL at 03:57

## 2020-04-01 NOTE — NUR
CM/SS finalized discharge plan.



Plan: The patient is discharging to [facility] in [place], [place].  CM/SS sent a referral to 
[facility] and [facility] from [employer] verbalized that he was accepted. Patient to be transferred by 
EMS. 



CM/SS spoke with the patients nurse to assess patients cognitive status. Unable to talk to 
patient at this time due to confusion.  



Next of Kin: The patients emergency contact Mariely Goldberg (his sister, [phone]) was 
contacted by this SS to inform her of acceptance to [facility] and to get verbal permission to 
transport patient for a continuation of care. Mariely verbalized that the patient also had 5 
brothers who needed to be involved with this decision. Mariely contacted all siblings. Mariely 
contacted this SS to report that they are all agreeable with the care plan/transfer. CM/SS 
gave [facility] from [facility] Mariely's number per his request to get verbal permission. No other 
needs at this time.

## 2020-04-01 NOTE — PULMONARY PROGRESS NOTE
Subjective


Date Seen by a Provider:  Apr 1, 2020


Time Seen by a Provider:  05:50


Subjective/Events-last exam


Pt is sedated and back on Precedex





Sepsis Event


Evaluation


Height, Weight, BMI


Height: 5'7.50"


Weight: 365lbs. 0oz. 165.286093cf; 42.63 BMI


Method:Estimated





Focused Exam


Lactate Level


3/29/20 12:00: Lactic Acid Level 1.21





Exam


Exam





Vital Signs








  Date Time  Temp Pulse Resp B/P (MAP) Pulse Ox O2 Delivery O2 Flow Rate FiO2


 


4/1/20 05:00  65 17 131/69 (89) 99 Room Air  


 


4/1/20 04:00      Room Air  


 


4/1/20 04:00  76 18 156/95 (115) 100 Room Air  


 


4/1/20 03:00  81 26 151/90 (110) 98 Room Air  


 


4/1/20 02:00  79 25 162/128 (139) 99 Room Air  


 


4/1/20 01:00  72      


 


4/1/20 01:00  73 19 146/102 (117) 98 Room Air  


 


4/1/20 00:00  67 14 129/92 (104) 99 Room Air  


 


4/1/20 00:00 36.1       


 


4/1/20 00:00      Room Air  


 


3/31/20 23:00  75 15 149/85 (106) 100 Room Air  


 


3/31/20 22:00  68 16 177/108 (131) 100 Room Air  


 


3/31/20 21:00  73 19 198/137 (157) 99 Room Air  


 


3/31/20 20:00  86 19 171/113 (132) 100 Room Air  


 


3/31/20 20:00      Room Air  


 


3/31/20 20:00 35.9       


 


3/31/20 19:30  73  198/137    


 


3/31/20 19:00  72 16 187/116 (139) 98 Room Air  


 


3/31/20 19:00  74      


 


3/31/20 18:17     98 Room Air  


 


3/31/20 18:00  71 15 175/112 (133) 98 Room Air  


 


3/31/20 17:00  68 22 160/116 (131)  Room Air  


 


3/31/20 16:00  71 14 137/85 (102)  Room Air  


 


3/31/20 16:00 35.8       


 


3/31/20 16:00      Room Air  


 


3/31/20 16:00     98   


 


3/31/20 15:00  71 15 162/117 (132)  Room Air  


 


3/31/20 14:00  75 16 129/88 (102) 98 Room Air  


 


3/31/20 13:00  84 15 120/79 (93) 99 Room Air  


 


3/31/20 12:26  105      


 


3/31/20 12:00      Room Air  


 


3/31/20 12:00  112 34 184/104 (130)  Room Air  


 


3/31/20 11:00  106 14 181/137 (152)  Room Air  


 


3/31/20 10:00  98 15 166/118 (134)  Room Air  


 


3/31/20 09:33     98 Room Air  


 


3/31/20 09:00  87 16 176/122 (140) 99 Room Air  


 


3/31/20 08:22      Room Air  


 


3/31/20 08:00  81 26 179/126 (143) 96 Room Air  


 


3/31/20 07:00  65 14 170/110 (130) 96 Room Air  


 


3/31/20 07:00  75      


 


3/31/20 06:00  62 18 162/113 (129) 99 Room Air  














I & O 


 


 4/1/20





 07:00


 


Intake Total 1366 ml


 


Output Total 78864 ml


 


Balance -95234 ml








Height & Weight


Height: 5'7.50"


Weight: 365lbs. 0oz. 165.361429hj; 42.63 BMI


Method:Estimated


General Appearance:  No Apparent Distress, Obese


HEENT:  PERRL/EOMI, Moist Mucous Membranes


Respiratory:  Lungs Clear, Normal Breath Sounds, No Respiratory Distress


Cardiovascular:  Regular Rate, Rhythm, No Murmur


Capillary Refill:  Less Than 3 Seconds


Gastrointestinal:  non tender, soft, no organomegaly, no pulsatile mass, hernia 

(??small UH)


Extremity:  Normal Inspection, Non Tender, Pedal Edema


Neurologic/Psychiatric:  Disoriented, Motor Weakness, Other (Lethargic)


Skin:  Normal Color, Warm/Dry





Results


Lab


Laboratory Tests


3/31/20 03:30








4/1/20 02:51











Assessment/Plan


Assessment/Plan


Acute respiratory failure 


   -resolved 


   -D/c Precedex gtt 


Psychosis - aggressive behavior 


   -Pt is threatening to kill RN 


   -increase Geodon IM and D/C Precedex


      -Precedex was d/c'd yesterday however pt became very agitated swinging at 

RN. 


   -PRN Ativan, morphine 


PNA and r/o meningitis


   -Currently on Zosyn, azithromycin, and acylovir 


   -Cultures neg thus far


   -MRSA neg  


   -HSV ab pending 


Alcohol dependance 


   -CIWA -- D/C 


    -Ativan PRN for agitation 


Hypokalemia 


   -Replace 


Hypernatremia


   - 1/2 NS with D5 and 20meq of KCL at 50


HTN 


   -Change lopressor to per OG 


   -PRN hydralazine 


   -Increase Norvasc to 10mg daily 


      -Give OG meds now 


Severe dehydration 





80lb wt loss over last 8 mo 


metabolic encephalopathy s/p lumbar puncture 


GI/DVT ppx











MIGUEL VASQUEZ DO               Apr 1, 2020 05:54

## 2020-04-01 NOTE — PROGRESS NOTE - HOSPITALIST
Subjective


HPI/CC On Admission


Date Seen by Provider:  2020


Time Seen by Provider:  08:50


Chief complaint: Altered mental status with profound weight loss with severe 

hypokalemia with leukocytosis





History of present illness: This is a 64-year-old -American male who was 

brought to the [facility] by his  at his company 

extrusions in [place] due to confusion and difficulty tracking and taking 

care of himself.  Apparently went to [place] ER yesterday for lower back pain

lumbar spine x-ray obtained showing no abnormality and was sent home with a 

potassium supplement because it was low but he was noncompliant with that 

prescription as he has been noncompliant with all of Dr. Romero recommendation 

since he sees him in his care van in [place] on a regular basis for hyper

tension management.  He also reports he is an oral tobacco user and he has been 

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 

the weekends but does not touch it during the week because it could alter his 

job performance.  He is a .  He has never been  but 

engaged twice and has no children.  His sister was just discharged off my 

service today due to profound hypokalemia of 2.3 requiring aggressive IV 

supplementation.  She is a retired nurse of 35 years at [employer].  Urgent care ARNP called me and reported bilateral lower lobe pneumonia 

and in need of admission to the hospital. Labs and CXR were ordered and sepsis 

protocol followed. At this current time patient does have some problems turning 

in the bed because of left flank pain and I did order CT scans which showed an 

abnormality on the CT of the brain which requires an MRI scheduled for tomorrow 

and Dr. Fox was consulted and he had no evidence of the source of his 

profound weight loss or flank pain.  Urinalysis has been ordered.


Subjective/Events-last exam


He is lethargic this morning.  He is unable to provide any information.





Focused Exam


Lactate Level


3/29/20 12:00: Lactic Acid Level 1.21








Objective


Exam


Vital Signs





Vital Signs








  Date Time  Temp Pulse Resp B/P (MAP) Pulse Ox O2 Delivery O2 Flow Rate FiO2


 


20 10:01     99 Room Air  


 


20 10:00  78 19 132/78 (96)    


 


20 08:00 35.8       


 


3/30/20 11:51        21


 


3/30/20 06:00       21.00 





Capillary Refill : Less Than 3 Seconds


General Appearance:  Obese


Respiratory:  Lungs Clear, Normal Breath Sounds, No Respiratory Distress


Cardiovascular:  Regular Rate, Rhythm, No Murmur


Gastrointestinal:  Normal Bowel Sounds, Soft


Extremity:  Normal Inspection, Pedal Edema


Neurologic/Psychiatric:  Disoriented, Other (Lethargic)


Skin:  Normal Color, Warm/Dry





Results/Procedures


Lab


Laboratory Tests


20 02:51








Patient resulted labs reviewed.


Imaging:  Reviewed Imaging Report





Assessment/Plan


Assessment and Plan


Assess & Plan/Chief Complaint


Acute respiratory failure with hypoxia


Acute metabolic encephalopathy


Pneumonia


Agitation


-Continue Zosyn and Azithromycin for pneumonia


-CSF viral panel indicative of past HSV infection, IgM negative


-Discontinue acyclovir


-Precedex restarted overnight


-Attempting to transition to Geodon as needed


-Ativan as needed





Hypernatremia


-Continue D5 1/2 NS





Essential HTN


-Continue amlodipine and metoprolol


-Hydralazine PRN





Tobacco abuse


-Nicotine patch





Morbid obesity


-Clinically significant, no acute management needs





Critical illness myopathy


-PT/OT


- assisting with placement





DVT Prophylaxis: Lovenox





EtOH withdrawal, resolved


Hypokalemia, resolved





Diagnosis/Problems


Diagnosis/Problems





(1) Acute respiratory failure with hypoxia


Status:  Acute


(2) Acute metabolic encephalopathy


Status:  Acute


(3) Alcohol withdrawal


Status:  Resolved


Resolution Date/Time:  3/31/20 @ 11:30


(4) Hypokalemia


Status:  Resolved


Resolution Date/Time:  20 @ 11:13


(5) Hyponatremia


Status:  Resolved


Resolution Date/Time:  20 @ 11:13


(6) Essential hypertension


Status:  Chronic


(7) Tobacco abuse


Status:  Chronic





Clinical Quality Measures


DVT/VTE Risk/Contraindication:


Risk Factor Score Per Nursin


RFS Level Per Nursing on Admit:  4+=Very High











KACY IVY MD               2020 11:13

## 2020-04-01 NOTE — PHYSICAL THERAPY DAILY NOTE
PT Daily Note-Current


Subjective


Pt in bed upon arrival. Pt eyes closed, responded to name and PROM w/ occasional

grunts.





Pain





   Numeric Pain Scale:  10-Worst Possible Pain


   Location:  Lower


   Location Body Site:  Generalized


   Comment:  Pt unable to rate pain. Pt moaned/groaned/pulled LE's away during 

PROM





Mental Status


Patient Orientation:  Mumbles, Listless


Attachments:  Hankins Catheter, Other-See Comments (Telemetry ), IV





Transfers


SCALE: Activities may be completed with or without assistive devices.





6-Indepedent-patient completes the activity by him/herself with no assistance 

from a helper.


5-Set-up or Clean-up Assistance-helper sets up or cleans up; patient completes 

activity. [place] assists only prior to or  


    following the activity.


4-Supervision or Touching Assistance-helper provides verbal cues and/or 

touching/steadying and/or contact guard assistance as patient completes 

activity. Assistance may be provided   


    throughout the activity or intermittently.


3-Partial/Moderate Assistance-helper does LESS THAN HALF the effort. [place] 

lifts, holds or supports trunk or limbs, but provides less than half the effort.


2-Substantial/Maximal Assistance-helper does MORE THAN HALF the effort. [place] 

lifts or holds trunk or limbs and provides more than half the effort.


4-Rspgjvhcu-isxcrk does ALL the effort. Patient does none of the effort to 

complete the activity. Or, the assistance of 2 or more helpers is required for 

the patient to complete the  


    activity.


If activity was not attempted, code reason:


7-Patient Refused.


9-Not Applicable-not attempted and the patient did not perform the activity 

before the current illness, exacerbation or injury.


10-Not Attempted due to Environmental Limitations-(lack of equipment, weather 

restraints, etc.).


88-Not Attempted due to Medical Conditions or Safety Concerns.





Exercises


Supine Ex:  Ankle pumps, Heel Slides, Hip abd/add


Supine Reps:  15 (PROM for all supine Ex)





Treatments


Completed PROM on LE's. Pt had all needs met at end of treatment.





Assessment


Current Status:  Poor Progress


Pt moaned/groaned/pulled LE's away during PROM tx. Pt did not open eyes the 

entire time PTA was in room.





PT Short Term Goals


Short Term Goals


Time Frame:  Apr 4, 2020


Roll Left & Right:  3


Sit to lying:  3


Lying to sitting on side of be:  3


Sit to stand:  3


Chair/bed-to-chair transfer:  3


Walk 10 feet:  3





PT Long Term Goals


Long Term Goals


PT Long Term Goals Time Frame:  Apr 18, 2020


Roll Left & Right (QC):  6


Sit to Lying (QC):  6


Lying-Sitting on Side/Bed(QC):  6


Sit to Stand (QC):  6


Chair/Bed-to-Chair Xfer(QC):  6


Toilet Transfer (QC):  6


Does the Patient Walk:  Yes


Walk 10 feet (QC):  6


Walk 50ft with 2 Turns (QC):  6


Walk 150 ft (QC):  6


Walking 10ft on Uneven Surface:  6





PT Plan


Problem List


Problem List:  Activity Tolerance, Functional Strength, Safety, Balance, Gait, 

Transfer, Bed Mobility, ROM





Treatment/Plan


Treatment Plan:  Continue Plan of Care


Treatment Plan:  Bed Mobility, Education, Functional Activity Rosie, Functional 

Strength, Gait, Safety, Therapeutic Exercise, Transfers


Treatment Duration:  Apr 18, 2020


Frequency:  6 times per week


Estimated Hrs Per Day:  .25 hour per day





Time/GCodes


Time In:  1005


Time Out:  1020


Total Billed Treatment Time:  15


Total Billed Treatment


1, Ex x1 (15m)











ARNULFO LISA PTA            Apr 1, 2020 10:30

## 2020-04-01 NOTE — OCCUPATIONAL THER DAILY NOTE
OT Current Status-Daily Note


Subjective


Pt. sedated and opens/closes eyes during treatment.  OT checked with nursing.  

Nursing okay for OT to attempt treatment.





Mental Status/Objective


Patient Orientation:  Confused


Attachments:  IV, Telemetry





ADL-Treatment


Therapy Code Descriptions/Definitions 





Functional [place] Measure:


0=Not Assessed/NA        4=Minimal Assistance


1=Total Assistance        5=Supervision or Setup


2=Maximal Assistance  6=Modified [place]


3=Moderate Assistance 7=Complete IndependenceSCALE: Activities may be completed 

with or without assistive devices.





6-Indepedent-patient completes the activity by him/herself with no assistance 

from a helper.


5-Set-up or Clean-up Assistance-helper sets up or cleans up; patient completes 

activity. [place] assists only prior to or  


    following the activity.


4-Supervision or Touching Assistance-helper provides verbal cues and/or 

touching/steadying and/or contact guard assistance as patient completes 

activity. Assistance may be provided   


    throughout the activity or intermittently.


3-Partial/Moderate Assistance-helper does LESS THAN HALF the effort. [place] 

lifts, holds or supports trunk or limbs, but provides less than half the effort.


2-Substantial/Maximal Assistance-helper does MORE THAN HALF the effort. [place] 

lifts or holds trunk or limbs and provides more than half the effort.


4-Shapslqjg-txnwuq does ALL the effort. Patient does none of the effort to 

complete the activity. Or, the assistance of 2 or more helpers is required for 

the patient to complete the  


    activity.


If activity was not attempted, code reason:


7-Patient Refused.


9-Not Applicable-not attempted and the patient did not perform the activity 

before the current illness, exacerbation or injury.


10-Not Attempted due to Environmental Limitations-(lack of equipment, weather 

restraints, etc.).


88-Not Attempted due to Medical Conditions or Safety Concerns.





Other Treatment


Pt. in bed asleep.  OT gently encourages pt. to open eyes.  Pt's eyes flutter.  

OT lets pt. know that she will be performing gentle PROM to bilateral UE.  OT 

completes elbow flexion/extension.  However, pt. resistive at times.  Unable to 

follow cues.  OT attempts gentle wrist flexion/extension.  Pt. resistive at this

time as well.  OT unable to achieve full PROM movements.  Noted pt's oxygen 

saturations dropping throughout.  Sats dropped to 78%.  OT stopped treatment and

gently encouraged pt.  Sats came back up to 93% on monitor.  Session shortened 

due to inability to participate and unstable saturations with movement.  Will 

continue to monitor pt.





Education


OT Patient Education:  Correct positioning, Exercise program


Teaching Recipient:  Patient


Teaching Methods:  Discussion


Response to Teaching:  Unable to Comprehend





OT Long Term Goals


Long Term Goals


Time Frame:  Apr 7, 2020


Eating (QC):  5


Oral Hygiene (QC):  5


Toileting Hygiene (QC):  3


Shower/Bathe Self (QC):  3


Upper Body Dressing (QC):  5


Lower Body Dressing (QC):  3


On/Off Footwear (QC):  3


Additional Goals:  1-Demonstrate ADL Tasks, 2-Verbalize Understanding, 3-

ImproveStrength/Rosie


1=Demonstrate adherence to instructed precautions during ADL tasks.


2=Patient will verbalize/demonstrate understanding of assistive 

devices/modifications for ADL.


3=Patient will improve strength/tolerance for activity to enable patient to 

perform ADL's.





OT Education/Plan


Problem List/Assessment


Assessment:  Decreased Activ Tolerance, Decreased Safety Aware, Decreased UE 

Strength, Dependent Transfers, Impaired Bed Mobility, Impaired Cognition, 

Impaired Coordination, Impaired Funct Balance, Impaired I ADL's, Impaired Self-

Care Skills, Restricted Funct UE ROM





Discharge Recommendations


Plan/Recommendations:  Continue POC


Therapy Discharge Recommendati:  24 Hour Supervision





Treatment Plan/Plan of Care


Treatment,Training & Education:  Yes


Patient would benefit from OT for education, treatment and training to promote 

independence in ADL's, mobility, safety and/or upper extremity function for 

ADL's.


Plan of Care:  ADL Retraining, Caregiver Training, Cognitive Retraining, 

Functional Mobility, UE Funct Exercise/Act, W/C Management Training


Treatment Duration:  Apr 7, 2020


Frequency:  5 times per week


Estimated Hrs Per Day:  .25 hour per day


Agreement:  Yes


Rehab Potential:  Guarded





Time/GCodes


Start Time:  10:27


Stop Time:  10:35


Total Time Billed (hr/min):  8


Billed Treatment Time


1, Ex











BEHZAD CELIS OT            Apr 1, 2020 10:44

## 2020-04-01 NOTE — DIAGNOSTIC IMAGING REPORT
INDICATION: Follow-up pneumonia. ICU patient.



COMPARISON: 03/31/2020.



FINDINGS: Left PICC line remains present. There is increasing

consolidation left lower lung with obscuration left hemidiaphragm

and left costophrenic angle. Left upper lung is clear. Right lung

is well-aerated and clear. There does appear to be some volume

loss left lung base.



IMPRESSION: Development of left lower lobe atelectasis and

infiltrate since previous exam with obscuration left

hemidiaphragm.



Dictated by: 



  Dictated on workstation # [id number]

## 2020-04-01 NOTE — NUR
1441 EMS Shift captain called by this nurse, he gave me the go ahead to call [facility] 
EMS dispatch.

1442 this nurse called [facility] EMS dispatch

1454 this nurse called [facility] where patient will be transferring. Report given to 
Isadora ROBERTS at [place], I asked Isadora to please call me back if she has any further questions. 


1550 EMS here to transport patient to [place]. Patients blood pressure elevated and patient 
now getting agitated, this nurse spoke with EMS about PRN medications available, PRN 
hydralazine IV and ativan IV given and EMS agreed to administration. Transfer sheet and 
packet given to EMS. This nurse, 2 EMS and Adamaris RN transferred patient to [facility].

## 2020-04-02 NOTE — DISCHARGE SUMMARY
Discharge Summary


Hospital Course


Was the Problem List Reviewed?:  Yes


Problems/Dx:  


(1) Acute respiratory failure with hypoxia


Status:  Resolved


(2) Acute metabolic encephalopathy


Status:  Acute


(3) Alcohol withdrawal


Status:  Resolved


(4) Hypokalemia


Status:  Resolved


(5) Hyponatremia


Status:  Resolved


(6) Essential hypertension


Status:  Chronic


(7) Tobacco abuse


Status:  Chronic


Hospital Course


Date of Admission: Mar 26, 2020 at 14:45 


Admission Diagnosis :  Acute metabolic encephalopathy





Family Physician/Provider: No,Local Physician  





Date of Discharge: 20 


Discharge Diagnosis:  Acute respiratory failure with hypoxia due to pneumonia, 

acute metabolic encephalopathy








Hospital Course:


Sánchez Chacon is a 64-year-old male who presented with altered mental status and 

was admitted with acute hypoxic respiratory failure and pneumonia.  He required 

intubation and after a few days was successfully extubated.  He was treated with

IV antibiotics, Zosyn and azithromycin.  He underwent a lumbar puncture to 

evaluate for meningitis/encephalitis.  The lumbar puncture did not reveal an 

infectious source for his encephalopathy.  His altered mental status improved 

but he remained very agitated and combative.  He required Precedex for sedation.

 He was also given Geodon and Ativan.  He was initially placed on alcohol 

withdrawal protocol but this resolved prior to discharge.  His course was 

complicated by hypertension which was treated with IV hydralazine and 

electrolyte disturbances which were corrected.  He was transferred to [facility] for ongoing cares.














Labs and Pending Lab Test:


Laboratory Tests


20 11:44: Glucometer 96





Microbiology


3/27/20 Gram Stain - Final, Complete


          


3/27/20 CSF Culture - Final, Complete


          No growth


3/27/20 Gram Stain - Final, Complete


          


3/27/20 Sputum Culture - Final, Complete


          Usual upper respiratory tanesha


3/26/20 Blood Culture - Final, Complete


          Staph, Coag Neg (CNS)





Home Meds


Active


No Active Prescriptions or Reported Medications


Assessment/Pt Instructions


Patient transferred to the [facility].


Discharge Planning:  <30 minutes discharge planning





Discharge Instructions


Discharge Diet:  No Restrictions


Activity as Tolerated:  Yes


Consultations


Pulmonology





Discharge Physical Examination


Vital Signs





Vital Signs








  Date Time  Temp Pulse Resp B/P (MAP) Pulse Ox O2 Delivery O2 Flow Rate FiO2


 


20 15:00  105 18 178/128 (145) 98 Room Air  


 


20 12:00 36.2       


 


3/30/20 11:51        21


 


3/30/20 06:00       21.00 








General Appearance:  No Apparent Distress, Obese


Neurologic/Psychiatric:  Disoriented


Allergies:  


Coded Allergies:  


     No Known Drug Allergies (Unverified , 19)





Discharge Summary


Date of Admission


Mar 26, 2020 at 14:45


Date of Discharge


2020 at 15:50


Discharge Date:  2020


Discharge Time:  15:50


Admission Diagnosis


Acute metabolic encephalopathy


Consults/Procedures


Consulations


Pulmonology





Discharge Diagnosis


Acute respiratory failure with hypoxia


Acute metabolic encephalopathy


Pneumonia


Agitation





(1) Acute respiratory failure with hypoxia


Status:  Resolved


(2) Acute metabolic encephalopathy


Status:  Acute


(3) Alcohol withdrawal


Status:  Resolved


(4) Hypokalemia


Status:  Resolved


(5) Hyponatremia


Status:  Resolved


(6) Essential hypertension


Status:  Chronic


(7) Tobacco abuse


Status:  Chronic





Clinical Quality Measures


DVT/VTE Risk/Contraindication:


Risk Factor Score Per Nursin


RFS Level Per Nursing on Admit:  4+=Very High











KACY IVY MD               2020 09:30

## 2020-11-07 ENCOUNTER — HOSPITAL ENCOUNTER (INPATIENT)
Dept: HOSPITAL 75 - ER FS | Age: 65
LOS: 9 days | Discharge: HOME | DRG: 208 | End: 2020-11-16
Attending: FAMILY MEDICINE | Admitting: FAMILY MEDICINE
Payer: MEDICAID

## 2020-11-07 VITALS — SYSTOLIC BLOOD PRESSURE: 88 MMHG | DIASTOLIC BLOOD PRESSURE: 55 MMHG

## 2020-11-07 VITALS — BODY MASS INDEX: 38.6 KG/M2 | WEIGHT: 251.77 LBS | HEIGHT: 67.72 IN

## 2020-11-07 VITALS — DIASTOLIC BLOOD PRESSURE: 57 MMHG | SYSTOLIC BLOOD PRESSURE: 99 MMHG

## 2020-11-07 DIAGNOSIS — M19.91: ICD-10-CM

## 2020-11-07 DIAGNOSIS — F03.91: ICD-10-CM

## 2020-11-07 DIAGNOSIS — S20.411A: ICD-10-CM

## 2020-11-07 DIAGNOSIS — F41.9: ICD-10-CM

## 2020-11-07 DIAGNOSIS — J96.02: Primary | ICD-10-CM
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
## 2020-11-07 DIAGNOSIS — I10: ICD-10-CM

## 2020-11-07 DIAGNOSIS — E87.2: ICD-10-CM

## 2020-11-07 DIAGNOSIS — S30.811A: ICD-10-CM

## 2020-11-07 DIAGNOSIS — E87.6: ICD-10-CM
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
## 2020-11-07 DIAGNOSIS — F17.220: ICD-10-CM

## 2020-11-07 DIAGNOSIS — F10.229: ICD-10-CM

## 2020-11-07 DIAGNOSIS — I95.9: ICD-10-CM

## 2020-11-07 DIAGNOSIS — G93.41: ICD-10-CM

## 2020-11-07 DIAGNOSIS — E66.01: ICD-10-CM

## 2020-11-07 DIAGNOSIS — I87.2: ICD-10-CM

## 2020-11-07 DIAGNOSIS — S20.412A: ICD-10-CM

## 2020-11-07 DIAGNOSIS — M54.9: ICD-10-CM

## 2020-11-07 DIAGNOSIS — K72.90: ICD-10-CM

## 2020-11-07 DIAGNOSIS — E87.0: ICD-10-CM

## 2020-11-07 LAB
ALBUMIN SERPL-MCNC: 3.1 GM/DL (ref 3.2–4.5)
ALBUMIN SERPL-MCNC: 3.2 GM/DL (ref 3.2–4.5)
ALBUMIN SERPL-MCNC: 3.4 GM/DL (ref 3.2–4.5)
ALP SERPL-CCNC: 48 U/L (ref 40–136)
ALP SERPL-CCNC: 50 U/L (ref 40–136)
ALP SERPL-CCNC: 52 U/L (ref 40–136)
ALT SERPL-CCNC: 11 U/L (ref 0–55)
ALT SERPL-CCNC: 9 U/L (ref 0–55)
ALT SERPL-CCNC: 9 U/L (ref 0–55)
AMMONIA PLAS-SCNC: 46 UMOL/L (ref 11–32)
AMORPH SED URNS QL MICRO: (no result) /LPF
APTT PPP: YELLOW S
ARTERIAL PATENCY WRIST A: (no result)
BACTERIA #/AREA URNS HPF: NEGATIVE /HPF
BARBITURATES UR QL: NEGATIVE
BASE EXCESS STD BLDA CALC-SCNC: -5 MMOL/L (ref -2.5–2.5)
BASE EXCESS STD BLDA CALC-SCNC: -7.1 MMOL/L (ref -2.5–2.5)
BASOPHILS # BLD AUTO: 0 10^3/UL (ref 0–0.1)
BASOPHILS NFR BLD AUTO: 0 % (ref 0–10)
BDY SITE: (no result)
BDY SITE: (no result)
BENZODIAZ UR QL SCN: NEGATIVE
BILIRUB SERPL-MCNC: 0.2 MG/DL (ref 0.1–1)
BILIRUB SERPL-MCNC: 0.2 MG/DL (ref 0.1–1)
BILIRUB SERPL-MCNC: 0.3 MG/DL (ref 0.1–1)
BILIRUB UR QL STRIP: NEGATIVE
BODY TEMPERATURE: 35.8
BUN/CREAT SERPL: 5
BUN/CREAT SERPL: 5
BUN/CREAT SERPL: 6
CALCIUM SERPL-MCNC: 7.9 MG/DL (ref 8.5–10.1)
CALCIUM SERPL-MCNC: 8.1 MG/DL (ref 8.5–10.1)
CALCIUM SERPL-MCNC: 8.8 MG/DL (ref 8.5–10.1)
CHLORIDE SERPL-SCNC: 108 MMOL/L (ref 98–107)
CHLORIDE SERPL-SCNC: 113 MMOL/L (ref 98–107)
CHLORIDE SERPL-SCNC: 115 MMOL/L (ref 98–107)
CO2 BLDA CALC-SCNC: 19.6 MMOL/L (ref 21–31)
CO2 BLDA CALC-SCNC: 23.5 MMOL/L (ref 21–31)
CO2 SERPL-SCNC: 15 MMOL/L (ref 21–32)
CO2 SERPL-SCNC: 17 MMOL/L (ref 21–32)
CO2 SERPL-SCNC: 21 MMOL/L (ref 21–32)
COCAINE UR QL: NEGATIVE
CREAT SERPL-MCNC: 0.8 MG/DL (ref 0.6–1.3)
CREAT SERPL-MCNC: 0.83 MG/DL (ref 0.6–1.3)
CREAT SERPL-MCNC: 0.97 MG/DL (ref 0.6–1.3)
EOSINOPHIL # BLD AUTO: 0.1 10^3/UL (ref 0–0.3)
EOSINOPHIL NFR BLD AUTO: 1 % (ref 0–10)
FIBRINOGEN PPP-MCNC: CLEAR MG/DL
GFR SERPLBLD BASED ON 1.73 SQ M-ARVRAT: > 60 ML/MIN
GLUCOSE SERPL-MCNC: 100 MG/DL (ref 70–105)
GLUCOSE SERPL-MCNC: 112 MG/DL (ref 70–105)
GLUCOSE SERPL-MCNC: 90 MG/DL (ref 70–105)
GLUCOSE UR STRIP-MCNC: NEGATIVE MG/DL
HCT VFR BLD CALC: 39 % (ref 40–54)
HCT VFR BLD CALC: 40 % (ref 40–54)
HGB BLD-MCNC: 12.3 G/DL (ref 13.3–17.7)
HGB BLD-MCNC: 12.9 G/DL (ref 13.3–17.7)
INHALED O2 FLOW RATE: (no result) L/MIN
INHALED O2 FLOW RATE: (no result) L/MIN
INR PPP: 1.1 (ref 0.8–1.4)
KETONES UR QL STRIP: NEGATIVE
LEUKOCYTE ESTERASE UR QL STRIP: NEGATIVE
LYMPHOCYTES # BLD AUTO: 2.7 10^3/UL (ref 1–4)
LYMPHOCYTES NFR BLD AUTO: 33 % (ref 12–44)
MANUAL DIFFERENTIAL PERFORMED BLD QL: NO
MCH RBC QN AUTO: 28 PG (ref 25–34)
MCH RBC QN AUTO: 28 PG (ref 25–34)
MCHC RBC AUTO-ENTMCNC: 32 G/DL (ref 32–36)
MCHC RBC AUTO-ENTMCNC: 32 G/DL (ref 32–36)
MCV RBC AUTO: 85 FL (ref 80–99)
MCV RBC AUTO: 87 FL (ref 80–99)
METHADONE UR QL SCN: NEGATIVE
METHAMPHETAMINE SCREEN URINE S: NEGATIVE
MONOCYTES # BLD AUTO: 0.6 10^3/UL (ref 0–1)
MONOCYTES NFR BLD AUTO: 7 % (ref 0–12)
NEUTROPHILS # BLD AUTO: 4.7 10^3/UL (ref 1.8–7.8)
NEUTROPHILS NFR BLD AUTO: 58 % (ref 42–75)
NITRITE UR QL STRIP: NEGATIVE
OPIATES UR QL SCN: NEGATIVE
OXYCODONE UR QL: NEGATIVE
PCO2 BLDA: 38 MMHG (ref 35–45)
PCO2 BLDA: 48 MMHG (ref 35–45)
PH BLDA: 7.27 [PH] (ref 7.37–7.43)
PH BLDA: 7.3 [PH] (ref 7.37–7.43)
PH UR STRIP: 5.5 [PH] (ref 5–9)
PLATELET # BLD: 227 10^3/UL (ref 130–400)
PLATELET # BLD: 239 10^3/UL (ref 130–400)
PMV BLD AUTO: 10.4 FL (ref 7.4–10.4)
PMV BLD AUTO: 10.5 FL (ref 9–12.2)
PO2 BLDA: 89 MMHG (ref 79–93)
PO2 BLDA: 94 MMHG (ref 79–93)
POTASSIUM SERPL-SCNC: 3.2 MMOL/L (ref 3.6–5)
POTASSIUM SERPL-SCNC: 3.5 MMOL/L (ref 3.6–5)
POTASSIUM SERPL-SCNC: 3.8 MMOL/L (ref 3.6–5)
PROPOXYPH UR QL: NEGATIVE
PROT SERPL-MCNC: 6.3 GM/DL (ref 6.4–8.2)
PROT SERPL-MCNC: 6.3 GM/DL (ref 6.4–8.2)
PROT SERPL-MCNC: 6.8 GM/DL (ref 6.4–8.2)
PROT UR QL STRIP: NEGATIVE
PROTHROMBIN TIME: 14.7 SEC (ref 12.2–14.7)
RBC #/AREA URNS HPF: (no result) /HPF
SAO2 % BLDA FROM PO2: 95 % (ref 94–100)
SAO2 % BLDA FROM PO2: 96 % (ref 94–100)
SODIUM SERPL-SCNC: 143 MMOL/L (ref 135–145)
SODIUM SERPL-SCNC: 145 MMOL/L (ref 135–145)
SODIUM SERPL-SCNC: 149 MMOL/L (ref 135–145)
SP GR UR STRIP: 1.02 (ref 1.02–1.02)
TRICYCLICS UR QL SCN: NEGATIVE
VENTILATION MODE VENT: NO
VENTILATION MODE VENT: YES
WBC # BLD AUTO: 8 10^3/UL (ref 4.3–11)
WBC # BLD AUTO: 8.6 10^3/UL (ref 4.3–11)
WBC #/AREA URNS HPF: (no result) /HPF

## 2020-11-07 PROCEDURE — 94640 AIRWAY INHALATION TREATMENT: CPT

## 2020-11-07 PROCEDURE — 93041 RHYTHM ECG TRACING: CPT

## 2020-11-07 PROCEDURE — 72125 CT NECK SPINE W/O DYE: CPT

## 2020-11-07 PROCEDURE — 93005 ELECTROCARDIOGRAM TRACING: CPT

## 2020-11-07 PROCEDURE — 87040 BLOOD CULTURE FOR BACTERIA: CPT

## 2020-11-07 PROCEDURE — 82805 BLOOD GASES W/O2 SATURATION: CPT

## 2020-11-07 PROCEDURE — 94002 VENT MGMT INPAT INIT DAY: CPT

## 2020-11-07 PROCEDURE — 5A1945Z RESPIRATORY VENTILATION, 24-96 CONSECUTIVE HOURS: ICD-10-PCS

## 2020-11-07 PROCEDURE — 83605 ASSAY OF LACTIC ACID: CPT

## 2020-11-07 PROCEDURE — 84484 ASSAY OF TROPONIN QUANT: CPT

## 2020-11-07 PROCEDURE — 99291 CRITICAL CARE FIRST HOUR: CPT

## 2020-11-07 PROCEDURE — 70450 CT HEAD/BRAIN W/O DYE: CPT

## 2020-11-07 PROCEDURE — 90686 IIV4 VACC NO PRSV 0.5 ML IM: CPT

## 2020-11-07 PROCEDURE — 0BH17EZ INSERTION OF ENDOTRACHEAL AIRWAY INTO TRACHEA, VIA NATURAL OR ARTIFICIAL OPENING: ICD-10-PCS

## 2020-11-07 PROCEDURE — 74018 RADEX ABDOMEN 1 VIEW: CPT

## 2020-11-07 PROCEDURE — 96374 THER/PROPH/DIAG INJ IV PUSH: CPT

## 2020-11-07 PROCEDURE — 36415 COLL VENOUS BLD VENIPUNCTURE: CPT

## 2020-11-07 PROCEDURE — 80053 COMPREHEN METABOLIC PANEL: CPT

## 2020-11-07 PROCEDURE — 51702 INSERT TEMP BLADDER CATH: CPT

## 2020-11-07 PROCEDURE — 71045 X-RAY EXAM CHEST 1 VIEW: CPT

## 2020-11-07 PROCEDURE — 80320 DRUG SCREEN QUANTALCOHOLS: CPT

## 2020-11-07 PROCEDURE — 81000 URINALYSIS NONAUTO W/SCOPE: CPT

## 2020-11-07 PROCEDURE — 82962 GLUCOSE BLOOD TEST: CPT

## 2020-11-07 PROCEDURE — 80048 BASIC METABOLIC PNL TOTAL CA: CPT

## 2020-11-07 PROCEDURE — 87081 CULTURE SCREEN ONLY: CPT

## 2020-11-07 PROCEDURE — 74177 CT ABD & PELVIS W/CONTRAST: CPT

## 2020-11-07 PROCEDURE — 85730 THROMBOPLASTIN TIME PARTIAL: CPT

## 2020-11-07 PROCEDURE — 87070 CULTURE OTHR SPECIMN AEROBIC: CPT

## 2020-11-07 PROCEDURE — 84145 PROCALCITONIN (PCT): CPT

## 2020-11-07 PROCEDURE — 85025 COMPLETE CBC W/AUTO DIFF WBC: CPT

## 2020-11-07 PROCEDURE — 71260 CT THORAX DX C+: CPT

## 2020-11-07 PROCEDURE — 82150 ASSAY OF AMYLASE: CPT

## 2020-11-07 PROCEDURE — 85027 COMPLETE CBC AUTOMATED: CPT

## 2020-11-07 PROCEDURE — 85610 PROTHROMBIN TIME: CPT

## 2020-11-07 PROCEDURE — 80306 DRUG TEST PRSMV INSTRMNT: CPT

## 2020-11-07 PROCEDURE — 84100 ASSAY OF PHOSPHORUS: CPT

## 2020-11-07 PROCEDURE — 84478 ASSAY OF TRIGLYCERIDES: CPT

## 2020-11-07 PROCEDURE — 87205 SMEAR GRAM STAIN: CPT

## 2020-11-07 PROCEDURE — 94003 VENT MGMT INPAT SUBQ DAY: CPT

## 2020-11-07 PROCEDURE — 94760 N-INVAS EAR/PLS OXIMETRY 1: CPT

## 2020-11-07 PROCEDURE — 02HV33Z INSERTION OF INFUSION DEVICE INTO SUPERIOR VENA CAVA, PERCUTANEOUS APPROACH: ICD-10-PCS | Performed by: SURGERY

## 2020-11-07 PROCEDURE — 94799 UNLISTED PULMONARY SVC/PX: CPT

## 2020-11-07 PROCEDURE — 82140 ASSAY OF AMMONIA: CPT

## 2020-11-07 PROCEDURE — 83735 ASSAY OF MAGNESIUM: CPT

## 2020-11-07 PROCEDURE — 83690 ASSAY OF LIPASE: CPT

## 2020-11-07 PROCEDURE — 31500 INSERT EMERGENCY AIRWAY: CPT

## 2020-11-07 RX ADMIN — Medication SCH MLS/HR: at 19:31

## 2020-11-07 RX ADMIN — VANCOMYCIN HYDROCHLORIDE SCH MLS/HR: 500 INJECTION, POWDER, LYOPHILIZED, FOR SOLUTION INTRAVENOUS at 22:53

## 2020-11-07 RX ADMIN — PROPOFOL SCH MLS/HR: 10 INJECTION, EMULSION INTRAVENOUS at 21:29

## 2020-11-07 RX ADMIN — INSULIN ASPART SCH UNIT: 100 INJECTION, SOLUTION INTRAVENOUS; SUBCUTANEOUS at 18:06

## 2020-11-07 RX ADMIN — METRONIDAZOLE SCH MLS/HR: 5 INJECTION, SOLUTION INTRAVENOUS at 22:50

## 2020-11-07 RX ADMIN — SODIUM CHLORIDE SCH MLS/HR: 900 INJECTION, SOLUTION INTRAVENOUS at 14:10

## 2020-11-07 RX ADMIN — DEXMEDETOMIDINE HYDROCHLORIDE SCH MLS/HR: 4 INJECTION, SOLUTION INTRAVENOUS at 17:00

## 2020-11-07 RX ADMIN — PROPOFOL SCH MLS/HR: 10 INJECTION, EMULSION INTRAVENOUS at 15:10

## 2020-11-07 RX ADMIN — SODIUM CHLORIDE SCH MLS/HR: 900 INJECTION, SOLUTION INTRAVENOUS at 12:27

## 2020-11-07 RX ADMIN — INSULIN ASPART SCH UNIT: 100 INJECTION, SOLUTION INTRAVENOUS; SUBCUTANEOUS at 21:39

## 2020-11-07 RX ADMIN — DEXTROSE MONOHYDRATE SCH MLS/HR: 5 INJECTION, SOLUTION INTRAVENOUS at 21:34

## 2020-11-07 RX ADMIN — ENOXAPARIN SODIUM SCH MG: 100 INJECTION SUBCUTANEOUS at 19:29

## 2020-11-07 RX ADMIN — SODIUM CHLORIDE SCH MLS/HR: 900 INJECTION, SOLUTION INTRAVENOUS at 22:50
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
## 2020-11-07 NOTE — NUR
PT .00 CASH ON HIM.  MONEY WAS COUNTED WITH DIANE FROM REGISTRATION.  
MONEY GIVEN TO JOE RESENDIZ THE SISTER ON HIS CHART.
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
## 2020-11-07 NOTE — PROGRESS NOTE-POST OPERATIVE
Post-Operative Progess Note


Surgeon (s)/Assistant (s)


Surgeon


CAMI DEL CASTILLO DO


Assistant:  none





Pre-Operative Diagnosis


Hypotension, Venous insufficiency





Post-Operative Diagnosis





same





Procedure & Operative Findings


Date of Procedure


11/7/20


Procedure Performed/Findings





PROCEDURE:


[Right] internal jugular central line placement using ultrasound guidance. 


 


COMPLICATIONS:


None. 


 


INDICATIONS:


The patient is a 64 year old male [with hypotension and venous insufficiency]. 


Patient is intubated and needs line, family informed.


 


PROCEDURE:


The patient was in his ICU bed; prepped and draped in the sterile fashion. 


A surgical pause was performed.  Ultrasound was used to locate the internal 


jugular vein.  Once located anesthetic was infiltrated above it.  Next an 18


gauge finder needle was advanced with negative inspiration and the right interna

l 


jugular vein was accessed. Able to watch the needle go into the vein with the


US and dark nonpulsatile blood was withdrawn. The wire was inserted.  US assured


proper placement. The needle was removed.  A #11 blade scalpel was used to


make a stab incision along the wire and then the dilator was placed over the


wire using the Seldinger technique.  It advanced easily and was then removed. 


Finally the triple lumen catheter was advanced over the guidewire using the


Seldinger technique and then the wire was removed. The catheter was


then accessed in all three ports without difficulty and then flushed with 

saline. 


The catheter was then sutured in place with 3-0 Silk suture on a Jerrod needle.


The areas were then washed and dried. Sterile dressing was placed over the 


catheter. The patient tolerated the procedure well without complication.


Anesthesia Type


local lidocaine





Estimated Blood Loss


Estimated blood loss (mL):  scant





Specimens/Packing


Specimens Removed


none











CAMI DEL CASTILLO DO                Nov 7, 2020 19:03
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
## 2020-11-07 NOTE — DIAGNOSTIC IMAGING REPORT
INDICATION: Respiratory distress



FINDINGS: An ET tube has been placed. The tip in the mid thoracic

trachea in good alignment. There is unlikely patchy infiltrate in

the medial left lung base. No pneumothorax. No displaced chest

wall fracture deformity.



IMPRESSION: Patchy opacity medial left base infiltrate or

atelectasis. The ET tube is in good alignment.



Dictated by: 



  Dictated on workstation # [id number]
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
## 2020-11-08 VITALS — SYSTOLIC BLOOD PRESSURE: 118 MMHG | DIASTOLIC BLOOD PRESSURE: 67 MMHG

## 2020-11-08 VITALS — SYSTOLIC BLOOD PRESSURE: 124 MMHG | DIASTOLIC BLOOD PRESSURE: 67 MMHG

## 2020-11-08 VITALS — DIASTOLIC BLOOD PRESSURE: 73 MMHG | SYSTOLIC BLOOD PRESSURE: 117 MMHG

## 2020-11-08 VITALS — SYSTOLIC BLOOD PRESSURE: 97 MMHG | DIASTOLIC BLOOD PRESSURE: 60 MMHG

## 2020-11-08 VITALS — DIASTOLIC BLOOD PRESSURE: 69 MMHG | SYSTOLIC BLOOD PRESSURE: 139 MMHG

## 2020-11-08 VITALS — SYSTOLIC BLOOD PRESSURE: 120 MMHG | DIASTOLIC BLOOD PRESSURE: 60 MMHG

## 2020-11-08 LAB
AMYLASE SERPL-CCNC: 39 U/L (ref 25–125)
ARTERIAL PATENCY WRIST A: POSITIVE
BASE EXCESS STD BLDA CALC-SCNC: -8.8 MMOL/L (ref -2.5–2.5)
BDY SITE: (no result)
BODY TEMPERATURE: 36.6
BUN/CREAT SERPL: 6
CALCIUM SERPL-MCNC: 7.8 MG/DL (ref 8.5–10.1)
CHLORIDE SERPL-SCNC: 117 MMOL/L (ref 98–107)
CO2 BLDA CALC-SCNC: 16.5 MMOL/L (ref 21–31)
CO2 SERPL-SCNC: 15 MMOL/L (ref 21–32)
CREAT SERPL-MCNC: 0.78 MG/DL (ref 0.6–1.3)
GFR SERPLBLD BASED ON 1.73 SQ M-ARVRAT: > 60 ML/MIN
GLUCOSE SERPL-MCNC: 83 MG/DL (ref 70–105)
HCT VFR BLD CALC: 37 % (ref 40–54)
HGB BLD-MCNC: 12 G/DL (ref 13.3–17.7)
INHALED O2 FLOW RATE: 21 L/MIN
LIPASE SERPL-CCNC: 15 U/L (ref 8–78)
MAGNESIUM SERPL-MCNC: 1.7 MG/DL (ref 1.6–2.4)
MCH RBC QN AUTO: 28 PG (ref 25–34)
MCHC RBC AUTO-ENTMCNC: 33 G/DL (ref 32–36)
MCV RBC AUTO: 86 FL (ref 80–99)
PCO2 BLDA: 28 MMHG (ref 35–45)
PH BLDA: 7.36 [PH] (ref 7.37–7.43)
PHOSPHATE SERPL-MCNC: 2.4 MG/DL (ref 2.3–4.7)
PLATELET # BLD: 209 10^3/UL (ref 130–400)
PMV BLD AUTO: 10.1 FL (ref 9–12.2)
PO2 BLDA: 113 MMHG (ref 79–93)
POTASSIUM SERPL-SCNC: 3.1 MMOL/L (ref 3.6–5)
SAO2 % BLDA FROM PO2: 98 % (ref 94–100)
SODIUM SERPL-SCNC: 148 MMOL/L (ref 135–145)
VENTILATION MODE VENT: YES
WBC # BLD AUTO: 9 10^3/UL (ref 4.3–11)

## 2020-11-08 RX ADMIN — FAMOTIDINE SCH MG: 10 INJECTION INTRAVENOUS at 10:15

## 2020-11-08 RX ADMIN — PROPOFOL SCH MLS/HR: 10 INJECTION, EMULSION INTRAVENOUS at 08:53

## 2020-11-08 RX ADMIN — POTASSIUM CHLORIDE SCH MLS/HR: 200 INJECTION, SOLUTION INTRAVENOUS at 10:07

## 2020-11-08 RX ADMIN — POTASSIUM CHLORIDE SCH MEQ: 1500 TABLET, EXTENDED RELEASE ORAL at 05:56

## 2020-11-08 RX ADMIN — POTASSIUM CHLORIDE SCH MLS/HR: 200 INJECTION, SOLUTION INTRAVENOUS at 07:51

## 2020-11-08 RX ADMIN — MAGNESIUM SULFATE IN DEXTROSE SCH MLS/HR: 10 INJECTION, SOLUTION INTRAVENOUS at 05:02

## 2020-11-08 RX ADMIN — POTASSIUM CHLORIDE SCH MLS/HR: 200 INJECTION, SOLUTION INTRAVENOUS at 06:33

## 2020-11-08 RX ADMIN — SODIUM CHLORIDE SCH MLS/HR: 900 INJECTION, SOLUTION INTRAVENOUS at 01:03

## 2020-11-08 RX ADMIN — INSULIN ASPART SCH UNIT: 100 INJECTION, SOLUTION INTRAVENOUS; SUBCUTANEOUS at 11:06

## 2020-11-08 RX ADMIN — DEXTROSE MONOHYDRATE SCH MLS/HR: 5 INJECTION, SOLUTION INTRAVENOUS at 07:52

## 2020-11-08 RX ADMIN — METRONIDAZOLE SCH MLS/HR: 5 INJECTION, SOLUTION INTRAVENOUS at 08:26

## 2020-11-08 RX ADMIN — PROPOFOL SCH MLS/HR: 10 INJECTION, EMULSION INTRAVENOUS at 01:03

## 2020-11-08 RX ADMIN — DEXTROSE MONOHYDRATE AND SODIUM CHLORIDE SCH MLS/HR: 5; .45 INJECTION, SOLUTION INTRAVENOUS at 14:59

## 2020-11-08 RX ADMIN — METRONIDAZOLE SCH MLS/HR: 5 INJECTION, SOLUTION INTRAVENOUS at 20:47

## 2020-11-08 RX ADMIN — PROPOFOL SCH MLS/HR: 10 INJECTION, EMULSION INTRAVENOUS at 20:21

## 2020-11-08 RX ADMIN — SODIUM CHLORIDE SCH MLS/HR: 900 INJECTION INTRAVENOUS at 11:10

## 2020-11-08 RX ADMIN — LACTULOSE SCH GM: 20 SOLUTION ORAL at 08:25

## 2020-11-08 RX ADMIN — VANCOMYCIN HYDROCHLORIDE SCH MLS/HR: 500 INJECTION, POWDER, LYOPHILIZED, FOR SOLUTION INTRAVENOUS at 23:02

## 2020-11-08 RX ADMIN — DEXMEDETOMIDINE HYDROCHLORIDE SCH MLS/HR: 4 INJECTION, SOLUTION INTRAVENOUS at 08:26

## 2020-11-08 RX ADMIN — SODIUM CHLORIDE SCH MLS/HR: 900 INJECTION, SOLUTION INTRAVENOUS at 17:32

## 2020-11-08 RX ADMIN — POTASSIUM CHLORIDE SCH MLS/HR: 200 INJECTION, SOLUTION INTRAVENOUS at 04:45

## 2020-11-08 RX ADMIN — Medication SCH MLS/HR: at 01:02

## 2020-11-08 RX ADMIN — POTASSIUM CHLORIDE SCH MLS/HR: 200 INJECTION, SOLUTION INTRAVENOUS at 09:03

## 2020-11-08 RX ADMIN — DEXTROSE MONOHYDRATE SCH MLS/HR: 5 INJECTION, SOLUTION INTRAVENOUS at 17:32

## 2020-11-08 RX ADMIN — POTASSIUM CHLORIDE SCH MLS/HR: 200 INJECTION, SOLUTION INTRAVENOUS at 05:56

## 2020-11-08 RX ADMIN — FAMOTIDINE SCH MG: 10 INJECTION INTRAVENOUS at 20:47

## 2020-11-08 RX ADMIN — POTASSIUM CHLORIDE SCH MLS/HR: 200 INJECTION, SOLUTION INTRAVENOUS at 12:33

## 2020-11-08 RX ADMIN — Medication SCH MLS/HR: at 10:53

## 2020-11-08 RX ADMIN — ENOXAPARIN SODIUM SCH MG: 100 INJECTION SUBCUTANEOUS at 17:40

## 2020-11-08 RX ADMIN — SODIUM CHLORIDE SCH MLS/HR: 900 INJECTION INTRAVENOUS at 05:03

## 2020-11-08 RX ADMIN — SODIUM CHLORIDE SCH MLS/HR: 900 INJECTION, SOLUTION INTRAVENOUS at 10:07

## 2020-11-08 RX ADMIN — POTASSIUM CHLORIDE SCH MLS/HR: 200 INJECTION, SOLUTION INTRAVENOUS at 05:37

## 2020-11-08 RX ADMIN — DEXTROSE MONOHYDRATE AND SODIUM CHLORIDE SCH MLS/HR: 5; .45 INJECTION, SOLUTION INTRAVENOUS at 05:02

## 2020-11-08 RX ADMIN — INSULIN ASPART SCH UNIT: 100 INJECTION, SOLUTION INTRAVENOUS; SUBCUTANEOUS at 05:02

## 2020-11-08 RX ADMIN — VANCOMYCIN HYDROCHLORIDE SCH MLS/HR: 500 INJECTION, POWDER, LYOPHILIZED, FOR SOLUTION INTRAVENOUS at 10:15

## 2020-11-08 RX ADMIN — DEXTROSE MONOHYDRATE AND SODIUM CHLORIDE SCH MLS/HR: 5; .45 INJECTION, SOLUTION INTRAVENOUS at 21:32

## 2020-11-08 RX ADMIN — SODIUM CHLORIDE SCH MLS/HR: 900 INJECTION, SOLUTION INTRAVENOUS at 00:02

## 2020-11-08 RX ADMIN — Medication SCH MLS/HR: at 05:56

## 2020-11-08 RX ADMIN — MAGNESIUM SULFATE IN DEXTROSE SCH MLS/HR: 10 INJECTION, SOLUTION INTRAVENOUS at 05:55

## 2020-11-08 RX ADMIN — INSULIN ASPART SCH UNIT: 100 INJECTION, SOLUTION INTRAVENOUS; SUBCUTANEOUS at 17:31

## 2020-11-08 RX ADMIN — LACTULOSE SCH GM: 20 SOLUTION ORAL at 20:47

## 2020-11-08 RX ADMIN — Medication SCH MLS/HR: at 14:59

## 2020-11-08 RX ADMIN — SODIUM CHLORIDE SCH MLS/HR: 900 INJECTION INTRAVENOUS at 17:40

## 2020-11-08 RX ADMIN — PROPOFOL SCH MLS/HR: 10 INJECTION, EMULSION INTRAVENOUS at 13:22

## 2020-11-08 RX ADMIN — ASCORBIC ACID, VITAMIN A PALMITATE, CHOLECALCIFEROL, THIAMINE HYDROCHLORIDE, RIBOFLAVIN-5 PHOSPHATE SODIUM, PYRIDOXINE HYDROCHLORIDE, NIACINAMIDE, DEXPANTHENOL, ALPHA-TOCOPHEROL ACETATE, VITAMIN K1, FOLIC ACID, BIOTIN, CYANOCOBALAMIN SCH MLS/HR: 200; 3300; 200; 6; 3.6; 6; 40; 15; 10; 150; 600; 60; 5 INJECTION, SOLUTION INTRAVENOUS at 10:07

## 2020-11-08 RX ADMIN — PROPOFOL SCH MLS/HR: 10 INJECTION, EMULSION INTRAVENOUS at 16:30

## 2020-11-08 RX ADMIN — SODIUM CHLORIDE SCH MLS/HR: 900 INJECTION INTRAVENOUS at 00:02

## 2020-11-08 RX ADMIN — RIFAXIMIN SCH MG: 550 TABLET ORAL at 08:25

## 2020-11-08 RX ADMIN — PROPOFOL SCH MLS/HR: 10 INJECTION, EMULSION INTRAVENOUS at 04:51

## 2020-11-08 RX ADMIN — SODIUM CHLORIDE SCH MLS/HR: 900 INJECTION, SOLUTION INTRAVENOUS at 20:47

## 2020-11-08 RX ADMIN — POTASSIUM CHLORIDE SCH MLS/HR: 200 INJECTION, SOLUTION INTRAVENOUS at 11:08

## 2020-11-08 RX ADMIN — VANCOMYCIN HYDROCHLORIDE SCH MLS/HR: 500 INJECTION, POWDER, LYOPHILIZED, FOR SOLUTION INTRAVENOUS at 00:01

## 2020-11-08 RX ADMIN — DEXTROSE MONOHYDRATE AND SODIUM CHLORIDE SCH MLS/HR: 5; .45 INJECTION, SOLUTION INTRAVENOUS at 10:08

## 2020-11-08 RX ADMIN — MAGNESIUM SULFATE IN DEXTROSE SCH MLS/HR: 10 INJECTION, SOLUTION INTRAVENOUS at 05:56

## 2020-11-08 RX ADMIN — ENOXAPARIN SODIUM SCH MG: 100 INJECTION SUBCUTANEOUS at 05:02

## 2020-11-08 RX ADMIN — Medication SCH MLS/HR: at 20:52

## 2020-11-08 RX ADMIN — RIFAXIMIN SCH MG: 550 TABLET ORAL at 20:47
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
## 2020-11-09 VITALS — DIASTOLIC BLOOD PRESSURE: 92 MMHG | SYSTOLIC BLOOD PRESSURE: 166 MMHG

## 2020-11-09 VITALS — DIASTOLIC BLOOD PRESSURE: 77 MMHG | SYSTOLIC BLOOD PRESSURE: 151 MMHG

## 2020-11-09 VITALS — DIASTOLIC BLOOD PRESSURE: 99 MMHG | SYSTOLIC BLOOD PRESSURE: 161 MMHG

## 2020-11-09 VITALS — DIASTOLIC BLOOD PRESSURE: 58 MMHG | SYSTOLIC BLOOD PRESSURE: 111 MMHG

## 2020-11-09 VITALS — SYSTOLIC BLOOD PRESSURE: 121 MMHG | DIASTOLIC BLOOD PRESSURE: 66 MMHG

## 2020-11-09 LAB
APTT BLD: 38 SEC (ref 24–35)
ARTERIAL PATENCY WRIST A: POSITIVE
BASE EXCESS STD BLDA CALC-SCNC: -2 MMOL/L (ref -2.5–2.5)
BDY SITE: (no result)
BODY TEMPERATURE: 36.1
BUN/CREAT SERPL: 6
CALCIUM SERPL-MCNC: 7.9 MG/DL (ref 8.5–10.1)
CHLORIDE SERPL-SCNC: 118 MMOL/L (ref 98–107)
CO2 BLDA CALC-SCNC: 22.4 MMOL/L (ref 21–31)
CO2 SERPL-SCNC: 20 MMOL/L (ref 21–32)
CREAT SERPL-MCNC: 0.81 MG/DL (ref 0.6–1.3)
GFR SERPLBLD BASED ON 1.73 SQ M-ARVRAT: > 60 ML/MIN
GLUCOSE SERPL-MCNC: 100 MG/DL (ref 70–105)
HCT VFR BLD CALC: 32 % (ref 40–54)
HGB BLD-MCNC: 10.2 G/DL (ref 13.3–17.7)
INHALED O2 FLOW RATE: 21 L/MIN
INR PPP: 1.2 (ref 0.8–1.4)
MAGNESIUM SERPL-MCNC: 2.3 MG/DL (ref 1.6–2.4)
MCH RBC QN AUTO: 28 PG (ref 25–34)
MCHC RBC AUTO-ENTMCNC: 32 G/DL (ref 32–36)
MCV RBC AUTO: 86 FL (ref 80–99)
PCO2 BLDA: 30 MMHG (ref 35–45)
PH BLDA: 7.46 [PH] (ref 7.37–7.43)
PHOSPHATE SERPL-MCNC: 2.4 MG/DL (ref 2.3–4.7)
PLATELET # BLD: 164 10^3/UL (ref 130–400)
PMV BLD AUTO: 11.3 FL (ref 9–12.2)
PO2 BLDA: 113 MMHG (ref 79–93)
POTASSIUM SERPL-SCNC: 3.5 MMOL/L (ref 3.6–5)
PROTHROMBIN TIME: 15.2 SEC (ref 12.2–14.7)
SAO2 % BLDA FROM PO2: 99 % (ref 94–100)
SODIUM SERPL-SCNC: 145 MMOL/L (ref 135–145)
TRIGL SERPL-MCNC: 91 MG/DL (ref ?–150)
VENTILATION MODE VENT: YES
WBC # BLD AUTO: 6.7 10^3/UL (ref 4.3–11)

## 2020-11-09 RX ADMIN — HYDRALAZINE HYDROCHLORIDE PRN MG: 20 INJECTION INTRAMUSCULAR; INTRAVENOUS at 09:04

## 2020-11-09 RX ADMIN — DEXMEDETOMIDINE HYDROCHLORIDE SCH MLS/HR: 4 INJECTION, SOLUTION INTRAVENOUS at 00:06

## 2020-11-09 RX ADMIN — ENOXAPARIN SODIUM SCH MG: 100 INJECTION SUBCUTANEOUS at 06:07

## 2020-11-09 RX ADMIN — PROPOFOL SCH MLS/HR: 10 INJECTION, EMULSION INTRAVENOUS at 00:05

## 2020-11-09 RX ADMIN — SODIUM CHLORIDE SCH MLS/HR: 900 INJECTION, SOLUTION INTRAVENOUS at 14:22

## 2020-11-09 RX ADMIN — SODIUM CHLORIDE SCH MLS/HR: 900 INJECTION INTRAVENOUS at 23:54

## 2020-11-09 RX ADMIN — LACTULOSE SCH GM: 20 SOLUTION ORAL at 20:07

## 2020-11-09 RX ADMIN — RIFAXIMIN SCH MG: 550 TABLET ORAL at 08:49

## 2020-11-09 RX ADMIN — SODIUM CHLORIDE SCH MLS/HR: 900 INJECTION, SOLUTION INTRAVENOUS at 06:08

## 2020-11-09 RX ADMIN — INSULIN ASPART SCH UNIT: 100 INJECTION, SOLUTION INTRAVENOUS; SUBCUTANEOUS at 06:07

## 2020-11-09 RX ADMIN — INSULIN ASPART SCH UNIT: 100 INJECTION, SOLUTION INTRAVENOUS; SUBCUTANEOUS at 23:54

## 2020-11-09 RX ADMIN — SODIUM CHLORIDE SCH MLS/HR: 900 INJECTION INTRAVENOUS at 11:35

## 2020-11-09 RX ADMIN — ASCORBIC ACID, VITAMIN A PALMITATE, CHOLECALCIFEROL, THIAMINE HYDROCHLORIDE, RIBOFLAVIN-5 PHOSPHATE SODIUM, PYRIDOXINE HYDROCHLORIDE, NIACINAMIDE, DEXPANTHENOL, ALPHA-TOCOPHEROL ACETATE, VITAMIN K1, FOLIC ACID, BIOTIN, CYANOCOBALAMIN SCH MLS/HR: 200; 3300; 200; 6; 3.6; 6; 40; 15; 10; 150; 600; 60; 5 INJECTION, SOLUTION INTRAVENOUS at 08:48

## 2020-11-09 RX ADMIN — SODIUM CHLORIDE SCH MLS/HR: 900 INJECTION, SOLUTION INTRAVENOUS at 23:54

## 2020-11-09 RX ADMIN — POTASSIUM CHLORIDE SCH MEQ: 1500 TABLET, EXTENDED RELEASE ORAL at 04:53

## 2020-11-09 RX ADMIN — RIFAXIMIN SCH MG: 550 TABLET ORAL at 20:07

## 2020-11-09 RX ADMIN — SODIUM CHLORIDE SCH MLS/HR: 900 INJECTION INTRAVENOUS at 18:39

## 2020-11-09 RX ADMIN — PROPOFOL SCH MLS/HR: 10 INJECTION, EMULSION INTRAVENOUS at 03:19

## 2020-11-09 RX ADMIN — SODIUM CHLORIDE SCH MLS/HR: 900 INJECTION, SOLUTION INTRAVENOUS at 03:25

## 2020-11-09 RX ADMIN — INSULIN ASPART SCH UNIT: 100 INJECTION, SOLUTION INTRAVENOUS; SUBCUTANEOUS at 18:39

## 2020-11-09 RX ADMIN — POTASSIUM CHLORIDE SCH MLS/HR: 200 INJECTION, SOLUTION INTRAVENOUS at 06:21

## 2020-11-09 RX ADMIN — LACTULOSE SCH GM: 20 SOLUTION ORAL at 06:07

## 2020-11-09 RX ADMIN — INSULIN ASPART SCH UNIT: 100 INJECTION, SOLUTION INTRAVENOUS; SUBCUTANEOUS at 00:17

## 2020-11-09 RX ADMIN — DEXTROSE MONOHYDRATE AND SODIUM CHLORIDE SCH MLS/HR: 5; .45 INJECTION, SOLUTION INTRAVENOUS at 04:27

## 2020-11-09 RX ADMIN — POTASSIUM CHLORIDE SCH MLS/HR: 200 INJECTION, SOLUTION INTRAVENOUS at 08:28

## 2020-11-09 RX ADMIN — SODIUM CHLORIDE SCH MLS/HR: 900 INJECTION INTRAVENOUS at 00:06

## 2020-11-09 RX ADMIN — METRONIDAZOLE SCH MLS/HR: 5 INJECTION, SOLUTION INTRAVENOUS at 20:07

## 2020-11-09 RX ADMIN — MAGNESIUM SULFATE IN DEXTROSE SCH MLS/HR: 10 INJECTION, SOLUTION INTRAVENOUS at 04:53

## 2020-11-09 RX ADMIN — DEXTROSE MONOHYDRATE AND SODIUM CHLORIDE SCH MLS/HR: 5; .45 INJECTION, SOLUTION INTRAVENOUS at 18:39

## 2020-11-09 RX ADMIN — FAMOTIDINE SCH MG: 10 INJECTION INTRAVENOUS at 08:48

## 2020-11-09 RX ADMIN — POTASSIUM CHLORIDE SCH MLS/HR: 200 INJECTION, SOLUTION INTRAVENOUS at 08:27

## 2020-11-09 RX ADMIN — DEXTROSE MONOHYDRATE AND SODIUM CHLORIDE SCH MLS/HR: 5; .45 INJECTION, SOLUTION INTRAVENOUS at 11:35

## 2020-11-09 RX ADMIN — INSULIN ASPART SCH UNIT: 100 INJECTION, SOLUTION INTRAVENOUS; SUBCUTANEOUS at 12:00

## 2020-11-09 RX ADMIN — FAMOTIDINE SCH MG: 10 INJECTION INTRAVENOUS at 20:07

## 2020-11-09 RX ADMIN — SODIUM CHLORIDE SCH MLS/HR: 900 INJECTION INTRAVENOUS at 06:07

## 2020-11-09 RX ADMIN — SODIUM CHLORIDE SCH MLS/HR: 900 INJECTION, SOLUTION INTRAVENOUS at 23:55

## 2020-11-09 RX ADMIN — POTASSIUM CHLORIDE SCH MLS/HR: 200 INJECTION, SOLUTION INTRAVENOUS at 04:52

## 2020-11-09 RX ADMIN — METRONIDAZOLE SCH MLS/HR: 5 INJECTION, SOLUTION INTRAVENOUS at 08:48

## 2020-11-09 RX ADMIN — DEXTROSE MONOHYDRATE SCH MLS/HR: 5 INJECTION, SOLUTION INTRAVENOUS at 03:10

## 2020-11-09 RX ADMIN — POTASSIUM CHLORIDE SCH MLS/HR: 200 INJECTION, SOLUTION INTRAVENOUS at 07:08

## 2020-11-09 RX ADMIN — ENOXAPARIN SODIUM SCH MG: 100 INJECTION SUBCUTANEOUS at 18:39

## 2020-11-09 RX ADMIN — SODIUM CHLORIDE SCH MLS/HR: 900 INJECTION, SOLUTION INTRAVENOUS at 16:30

## 2020-11-09 RX ADMIN — Medication SCH MLS/HR: at 03:19

## 2020-11-09 RX ADMIN — METOPROLOL TARTRATE SCH MG: 25 TABLET, FILM COATED ORAL at 20:07

## 2020-11-09 NOTE — NUR
UNABLE TO SPEAK WITH PT AT THIS TIME- I HAVE ENTERED THE MED REC USING THE EXT MED HISTORY 
AND A MEDICATION LIST FROM [facility]. 

WHEN I CAN SPEAK WITH THE PT I WILL UPDATE THE MED REC/NOTES AS NEEDED

## 2020-11-09 NOTE — NUR
This RN notified EICU of multiple episodes of diarrhea due to patient's medication for 
elevated Ammonia. Pt on vent and incontinent. 

Order received for Flexiseal from EICU.

## 2020-11-09 NOTE — OCC THERAPY PROGRESS NOTE
Therapy Progress Note


Continuing to monitor pt.  Will assess when pt. is medically stable and off 

ventilator support.





[id number]











BEHZAD CELIS OT            Nov 9, 2020 08:27
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
## 2020-11-09 NOTE — PROGRESS NOTE
Subjective


Subjective/Events-last exam


Afebrile, remains intubated, but sedation off this morning and plan for 

extubation soon. Remains somnolent, does not respond to voice at this time, but 

does move around some.





Focused Exam


Lactate Level


20 07:32: Lactic Acid Level 3.95*H


20 09:50: Lactic Acid Level 3.28*H


20 12:58: Lactic Acid Level 1.44





Objective


Exam


Last Set of Vital Signs





Vital Signs








  Date Time  Temp Pulse Resp B/P (MAP) Pulse Ox O2 Delivery O2 Flow Rate FiO2


 


20 06:42  53 22  99   21


 


20 06:00    127/69  Mechanical Ventilator 21.00 


 


20 03:31 36.1       





Capillary Refill : Less Than 3 Seconds


I&O











Intake and Output 


 


 20





 00:00


 


Intake Total 3855 ml


 


Output Total 1875 ml


 


Balance 1980 ml


 


 


 


Intake Oral 0 ml


 


IV Total 3735 ml


 


Other 120 ml


 


Output Urine Total 1800 ml


 


Gastric Drainage Total 75 ml


 


# Bowel Movements 2








General:  Other (somnolent, intubated)


Lungs:  Clear to Auscultation, Normal Air Movement


Abdomen:  Normal Bowel Sounds, Soft


Extremities:  No Edema





Results/Procedures


Lab


Laboratory Tests


20 09:50: Lactic Acid Level 3.28*H


20 10:58: Glucometer 87


20 12:58: Lactic Acid Level 1.44


20 17:31: Glucometer 115H


20 00:17: Glucometer 117H


20 03:30: 


White Blood Count 6.7, Red Blood Count 3.69L, Hemoglobin 10.2L, Hematocrit 32L, 

Mean Corpuscular Volume 86, Mean Corpuscular Hemoglobin 28, Mean Corpuscular 

Hemoglobin Concent 32, Red Cell Distribution Width 17.3H, Platelet Count 164, M

ming Platelet Volume 11.3, Prothrombin Time 15.2H, INR Comment 1.2, Activated 

Partial Thromboplast Time 38H, Blood Gas Puncture Site RIGHT RADIAL WRIST, Blood

Gas Patient Temperature 36.1, Arterial Blood pH 7.46H, Arterial Blood Partial 

Pressure CO2 30L, Arterial Blood Partial Pressure O2 113H, Arterial Blood HCO3 

21L, Arterial Blood Total CO2 22.4, Arterial Blood Oxygen Saturation 99, 

Arterial Blood Base Excess -2.0, Mazin Test POSITIVE, Blood Gas Ventilator 

Setting YES, Blood Gas Inspired Oxygen 21, Sodium Level 145, Potassium Level 

3.5L, Chloride Level 118H, Carbon Dioxide Level 20L, Anion Gap 7, Blood Urea 

Nitrogen 5L, Creatinine 0.81, Estimat Glomerular Filtration Rate > 60, 

BUN/Creatinine Ratio 6, Glucose Level 100, Calcium Level 7.9L, Phosphorus Level 

2.4, Magnesium Level 2.3, Triglycerides Level 91


20 06:05: Ammonia 35H





Microbiology


20 Blood Culture - Preliminary, Resulted


          No growth


20 MRSA Screen - Final, Complete


          MRSA not isolated





Assessment/Plan


Assessment/Plan





(1) Hypertension


Status:  Chronic


Assessment & Plan:  BP elevated today, hydralazine prn while intubated. Monitor 

for need for ativan as BP elevation may also be manifestation of EtOH 

withdrawal.


Qualifiers:  


   Qualified Codes:  I10 - Essential (primary) hypertension


(2) Hypokalemia


Status:  Resolved


(3) Respiratory acidosis


Status:  Resolved


Assessment & Plan:  Initially with low pH and elevated CO2, now with slightly 

elevated pH and low CO2, sedation discontinued, plan for extubation today.





(4) Alcohol intoxication


Status:  Acute


Assessment & Plan:  Monitor for withdrawal symptoms, has had significant 

agitation/aggression with withdrawal in the past. Receiving banana bag daily.


Qualifiers:  


   Qualified Codes:  F10.929 - Alcohol use, unspecified with intoxication, 

unspecified


(5) Acute respiratory failure


Status:  Acute


Assessment & Plan:  Plan for extubation today, improving. Appreciate Dr. Wang's recommendations.


Qualifiers:  


   Qualified Codes:  J96.02 - Acute respiratory failure with hypercapnia


(6) Acute metabolic encephalopathy


Status:  Acute


Assessment & Plan:  Ammonia elevated, lactulose and rifaximin started.





(7) DVT prophylaxis


Status:  Acute


Assessment & Plan:  Enoxaparin








Clinical Quality Measures


DVT/VTE Risk/Contraindication:


Risk Factor Score Per Nursin


RFS Level Per Nursing on Admit:  4+=Very High











DUANE CAGE MD              2020 08:43

## 2020-11-09 NOTE — NUR
Sedation medication turned off at approximately 0730.



0930 - Family updated on patients current condition.
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
## 2020-11-09 NOTE — PHYSICAL THERAPY PROGRESS NOTE
Therapy Progress Note


Patient remains sedated and intubated.  PT will continue to monitor patient 

status and initiate treatment when patient is medically stable and able to 

actively participate with skilled therapy.











WILLIAM SAAVEDRA PT               Nov 9, 2020 08:22

## 2020-11-10 VITALS — SYSTOLIC BLOOD PRESSURE: 98 MMHG | DIASTOLIC BLOOD PRESSURE: 62 MMHG

## 2020-11-10 VITALS — SYSTOLIC BLOOD PRESSURE: 174 MMHG | DIASTOLIC BLOOD PRESSURE: 103 MMHG

## 2020-11-10 VITALS — DIASTOLIC BLOOD PRESSURE: 79 MMHG | SYSTOLIC BLOOD PRESSURE: 174 MMHG

## 2020-11-10 VITALS — SYSTOLIC BLOOD PRESSURE: 196 MMHG | DIASTOLIC BLOOD PRESSURE: 99 MMHG

## 2020-11-10 VITALS — DIASTOLIC BLOOD PRESSURE: 89 MMHG | SYSTOLIC BLOOD PRESSURE: 162 MMHG

## 2020-11-10 VITALS — DIASTOLIC BLOOD PRESSURE: 71 MMHG | SYSTOLIC BLOOD PRESSURE: 122 MMHG

## 2020-11-10 LAB
ALBUMIN SERPL-MCNC: 2.6 GM/DL (ref 3.2–4.5)
ALP SERPL-CCNC: 43 U/L (ref 40–136)
ALT SERPL-CCNC: 11 U/L (ref 0–55)
AMMONIA PLAS-SCNC: 25 UMOL/L (ref 11–32)
ARTERIAL PATENCY WRIST A: (no result)
BASE EXCESS STD BLDA CALC-SCNC: -3.9 MMOL/L (ref -2.5–2.5)
BDY SITE: (no result)
BILIRUB SERPL-MCNC: 0.6 MG/DL (ref 0.1–1)
BODY TEMPERATURE: 36.8
BUN/CREAT SERPL: 6
BUN/CREAT SERPL: 6
CALCIUM SERPL-MCNC: 8.3 MG/DL (ref 8.5–10.1)
CALCIUM SERPL-MCNC: 8.3 MG/DL (ref 8.5–10.1)
CHLORIDE SERPL-SCNC: 114 MMOL/L (ref 98–107)
CHLORIDE SERPL-SCNC: 116 MMOL/L (ref 98–107)
CO2 BLDA CALC-SCNC: 20.4 MMOL/L (ref 21–31)
CO2 SERPL-SCNC: 19 MMOL/L (ref 21–32)
CO2 SERPL-SCNC: 21 MMOL/L (ref 21–32)
CREAT SERPL-MCNC: 0.84 MG/DL (ref 0.6–1.3)
CREAT SERPL-MCNC: 0.85 MG/DL (ref 0.6–1.3)
GFR SERPLBLD BASED ON 1.73 SQ M-ARVRAT: > 60 ML/MIN
GFR SERPLBLD BASED ON 1.73 SQ M-ARVRAT: > 60 ML/MIN
GLUCOSE SERPL-MCNC: 113 MG/DL (ref 70–105)
GLUCOSE SERPL-MCNC: 122 MG/DL (ref 70–105)
HCT VFR BLD CALC: 34 % (ref 40–54)
HGB BLD-MCNC: 10.9 G/DL (ref 13.3–17.7)
INHALED O2 FLOW RATE: 21 L/MIN
MAGNESIUM SERPL-MCNC: 2.4 MG/DL (ref 1.6–2.4)
MCH RBC QN AUTO: 28 PG (ref 25–34)
MCHC RBC AUTO-ENTMCNC: 32 G/DL (ref 32–36)
MCV RBC AUTO: 87 FL (ref 80–99)
PCO2 BLDA: 29 MMHG (ref 35–45)
PH BLDA: 7.45 [PH] (ref 7.37–7.43)
PHOSPHATE SERPL-MCNC: 3.4 MG/DL (ref 2.3–4.7)
PLATELET # BLD: 157 10^3/UL (ref 130–400)
PMV BLD AUTO: 11 FL (ref 9–12.2)
PO2 BLDA: 71 MMHG (ref 79–93)
POTASSIUM SERPL-SCNC: 3.6 MMOL/L (ref 3.6–5)
POTASSIUM SERPL-SCNC: 3.7 MMOL/L (ref 3.6–5)
PROT SERPL-MCNC: 5.6 GM/DL (ref 6.4–8.2)
SAO2 % BLDA FROM PO2: 95 % (ref 94–100)
SODIUM SERPL-SCNC: 141 MMOL/L (ref 135–145)
SODIUM SERPL-SCNC: 142 MMOL/L (ref 135–145)
VENTILATION MODE VENT: YES
WBC # BLD AUTO: 6.8 10^3/UL (ref 4.3–11)

## 2020-11-10 RX ADMIN — ENOXAPARIN SODIUM SCH MG: 100 INJECTION SUBCUTANEOUS at 05:33

## 2020-11-10 RX ADMIN — SODIUM CHLORIDE SCH MLS/HR: 900 INJECTION INTRAVENOUS at 11:42

## 2020-11-10 RX ADMIN — POTASSIUM CHLORIDE SCH MLS/HR: 200 INJECTION, SOLUTION INTRAVENOUS at 05:28

## 2020-11-10 RX ADMIN — SODIUM CHLORIDE SCH MLS/HR: 900 INJECTION, SOLUTION INTRAVENOUS at 08:12

## 2020-11-10 RX ADMIN — SODIUM CHLORIDE SCH MLS/HR: 900 INJECTION INTRAVENOUS at 17:52

## 2020-11-10 RX ADMIN — INSULIN ASPART SCH UNIT: 100 INJECTION, SOLUTION INTRAVENOUS; SUBCUTANEOUS at 11:42

## 2020-11-10 RX ADMIN — DEXTROSE MONOHYDRATE AND SODIUM CHLORIDE SCH MLS/HR: 5; .45 INJECTION, SOLUTION INTRAVENOUS at 15:58

## 2020-11-10 RX ADMIN — DEXTROSE MONOHYDRATE AND SODIUM CHLORIDE SCH MLS/HR: 5; .45 INJECTION, SOLUTION INTRAVENOUS at 01:34

## 2020-11-10 RX ADMIN — MAGNESIUM SULFATE IN DEXTROSE SCH MLS/HR: 10 INJECTION, SOLUTION INTRAVENOUS at 03:34

## 2020-11-10 RX ADMIN — SODIUM CHLORIDE SCH MLS/HR: 900 INJECTION INTRAVENOUS at 05:33

## 2020-11-10 RX ADMIN — RIFAXIMIN SCH MG: 550 TABLET ORAL at 20:52

## 2020-11-10 RX ADMIN — ENOXAPARIN SODIUM SCH MG: 100 INJECTION SUBCUTANEOUS at 17:52

## 2020-11-10 RX ADMIN — METOPROLOL TARTRATE SCH MG: 25 TABLET, FILM COATED ORAL at 08:12

## 2020-11-10 RX ADMIN — INSULIN ASPART SCH UNIT: 100 INJECTION, SOLUTION INTRAVENOUS; SUBCUTANEOUS at 23:41

## 2020-11-10 RX ADMIN — METOPROLOL TARTRATE SCH MG: 25 TABLET, FILM COATED ORAL at 20:53

## 2020-11-10 RX ADMIN — ASCORBIC ACID, VITAMIN A PALMITATE, CHOLECALCIFEROL, THIAMINE HYDROCHLORIDE, RIBOFLAVIN-5 PHOSPHATE SODIUM, PYRIDOXINE HYDROCHLORIDE, NIACINAMIDE, DEXPANTHENOL, ALPHA-TOCOPHEROL ACETATE, VITAMIN K1, FOLIC ACID, BIOTIN, CYANOCOBALAMIN SCH MLS/HR: 200; 3300; 200; 6; 3.6; 6; 40; 15; 10; 150; 600; 60; 5 INJECTION, SOLUTION INTRAVENOUS at 08:12

## 2020-11-10 RX ADMIN — LACTULOSE SCH GM: 20 SOLUTION ORAL at 20:52

## 2020-11-10 RX ADMIN — FAMOTIDINE SCH MG: 10 INJECTION INTRAVENOUS at 20:53

## 2020-11-10 RX ADMIN — METRONIDAZOLE SCH MLS/HR: 5 INJECTION, SOLUTION INTRAVENOUS at 20:53

## 2020-11-10 RX ADMIN — INSULIN ASPART SCH UNIT: 100 INJECTION, SOLUTION INTRAVENOUS; SUBCUTANEOUS at 17:52

## 2020-11-10 RX ADMIN — POTASSIUM CHLORIDE SCH MEQ: 1500 TABLET, EXTENDED RELEASE ORAL at 03:34

## 2020-11-10 RX ADMIN — DEXTROSE MONOHYDRATE AND SODIUM CHLORIDE SCH MLS/HR: 5; .45 INJECTION, SOLUTION INTRAVENOUS at 22:04

## 2020-11-10 RX ADMIN — FAMOTIDINE SCH MG: 10 INJECTION INTRAVENOUS at 07:56

## 2020-11-10 RX ADMIN — METRONIDAZOLE SCH MLS/HR: 5 INJECTION, SOLUTION INTRAVENOUS at 07:56

## 2020-11-10 RX ADMIN — INSULIN ASPART SCH UNIT: 100 INJECTION, SOLUTION INTRAVENOUS; SUBCUTANEOUS at 05:28

## 2020-11-10 RX ADMIN — DEXTROSE MONOHYDRATE AND SODIUM CHLORIDE SCH MLS/HR: 5; .45 INJECTION, SOLUTION INTRAVENOUS at 07:56

## 2020-11-10 RX ADMIN — SODIUM CHLORIDE SCH MLS/HR: 900 INJECTION, SOLUTION INTRAVENOUS at 11:42

## 2020-11-10 RX ADMIN — LACTULOSE SCH GM: 20 SOLUTION ORAL at 07:56

## 2020-11-10 RX ADMIN — RIFAXIMIN SCH MG: 550 TABLET ORAL at 08:11
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
## 2020-11-10 NOTE — OCC THERAPY PROGRESS NOTE
Therapy Progress Note


Continuing to monitor pt for skilled therapy.





0823











BEHZAD CELIS OT           Nov 10, 2020 08:23

## 2020-11-10 NOTE — DIAGNOSTIC IMAGING REPORT
EXAMINATION: Portable erect AP chest at 3:03 AM



INDICATION: Respiratory distress



The mild cardiomegaly noted on the prior exam of 11/09/2020 is

again evident and no different. The atelectasis/infiltrate and

fluid in the left lung base seen previously has diminished. There

is still some residual density present, however. The left upper

lung and right lung seem generally clear. The central pulmonary

vascularity is not quite as prominent as on the prior exam. The

mediastinum is not widened. The osseous structures are intact.

The supportive tubes and lines remain in good position.



IMPRESSION: The appearance of the chest has improved since the

prior exam as the left lung base does seem better aerated and the

central pulmonary vascularity is not quite as prominent. A

follow-up study would be recommended for continued evaluation.



Dictated by: 



  Dictated on workstation # [id number]

## 2020-11-10 NOTE — PROGRESS NOTE
Subjective


Subjective/Events-last exam


Afebrile, remains minimally responsive in spite of being off of sedation for 

about 24 hours. Moves and moans occasionally but does not respond meaningfully.





Focused Exam


Lactate Level


20 07:32: Lactic Acid Level 3.95*H


20 09:50: Lactic Acid Level 3.28*H


20 12:58: Lactic Acid Level 1.44





Objective


Exam


Last Set of Vital Signs





Vital Signs








  Date Time  Temp Pulse Resp B/P (MAP) Pulse Ox O2 Delivery O2 Flow Rate FiO2


 


11/10/20 09:00     100 Mechanical Ventilator  21


 


11/10/20 07:58 36.7       


 


11/10/20 07:11  69 24     


 


11/10/20 06:00    159/86   21.00 





Capillary Refill : Less Than 3 Seconds


I&O











Intake and Output 


 


 11/10/20





 00:00


 


Intake Total 4210.2 ml


 


Output Total 3150 ml


 


Balance 1060.2 ml


 


 


 


Intake Oral 0 ml


 


IV Total 3840.2 ml


 


Other 370 ml


 


Output Urine Total 3150 ml








General:  Other (intubated, moves arms but does not open eyes or respond to 

voice)


Lungs:  Clear to Auscultation, Normal Air Movement


Heart:  Regular Rate, No Murmurs


Extremities:  Other (edema)





Results/Procedures


Lab


Laboratory Tests


20 11:55: Glucometer 100


20 17:15: Glucometer 94


20 23:37: Glucometer 95


11/10/20 02:55: 


Blood Gas Puncture Site ARTLINE, Blood Gas Patient Temperature 36.8, Arterial 

Blood pH 7.45H, Arterial Blood Partial Pressure CO2 29L, Arterial Blood Partial 

Pressure O2 71L, Arterial Blood HCO3 20L, Arterial Blood Total CO2 20.4L, 

Arterial Blood Oxygen Saturation 95, Arterial Blood Base Excess -3.9L, Mazin 

Test ARTLINE, Blood Gas Ventilator Setting YES, Blood Gas Inspired Oxygen 21


11/10/20 03:00: 


White Blood Count 6.8, Red Blood Count 3.90L, Hemoglobin 10.9L, Hematocrit 34L, 

Mean Corpuscular Volume 87, Mean Corpuscular Hemoglobin 28, Mean Corpuscular 

Hemoglobin Concent 32, Red Cell Distribution Width 17.9H, Platelet Count 157, 

Mean Platelet Volume 11.0, Sodium Level 142, Potassium Level 3.6, Chloride Level

116H, Carbon Dioxide Level 19L, Anion Gap 7, Blood Urea Nitrogen 5L, Creatinine 

0.85, Estimat Glomerular Filtration Rate > 60, BUN/Creatinine Ratio 6, Glucose 

Level 122H, Calcium Level 8.3L, Phosphorus Level 3.4, Magnesium Level 2.4


11/10/20 05:20: 


Sodium Level 141, Potassium Level 3.7, Chloride Level 114H, Carbon Dioxide Level

21, Anion Gap 6, Blood Urea Nitrogen 5L, Creatinine 0.84, Estimat Glomerular 

Filtration Rate > 60, BUN/Creatinine Ratio 6, Glucose Level 113H, Calcium Level 

8.3L, Corrected Calcium 9.4, Total Bilirubin 0.6, Aspartate Amino Transf 

(AST/SGOT) 13, Alanine Aminotransferase (ALT/SGPT) 11, Alkaline Phosphatase 43, 

Ammonia 25, Total Protein 5.6L, Albumin 2.6L, Serum Alcohol < 10





Microbiology


20 Blood Culture - Preliminary, Resulted


          No growth


20 MRSA Screen - Final, Complete


          MRSA not isolated





Assessment/Plan


Assessment/Plan





(1) Hypertension


Status:  Chronic


Assessment & Plan:  BP elevated 11/10, hydralazine prn while intubated. Monitor 

for need for ativan as BP elevation may also be manifestation of EtOH 

withdrawal.


Qualifiers:  


   Qualified Codes:  I10 - Essential (primary) hypertension


(2) Hypokalemia


Status:  Resolved


(3) Respiratory acidosis


Status:  Resolved


Assessment & Plan:  Initially with low pH and elevated CO2, now with slightly 

elevated pH and low CO2, sedation discontinued, plan for extubation today.





(4) Alcohol intoxication


Status:  Acute


Assessment & Plan:  Monitor for withdrawal symptoms, has had significant 

agitation/aggression with withdrawal in the past. Receiving banana bag daily.


11/10- off sedation, but remains altered, EtOH level negative.


Qualifiers:  


   Qualified Codes:  F10.929 - Alcohol use, unspecified with intoxication, 

unspecified


(5) Acute respiratory failure


Status:  Acute


Assessment & Plan:  Plan for extubation today, improving. Appreciate Dr. Wang's recommendations.


11/10- remains intubated due to not following directions or responding off 

sedation.


Qualifiers:  


   Qualified Codes:  J96.02 - Acute respiratory failure with hypercapnia


(6) Acute metabolic encephalopathy


Status:  Acute


Assessment & Plan:  Ammonia elevated, lactulose and rifaximin started.


11/10- remains minimally responsive, continue lactulose, rifaximin. Head CT 

without acute abnormalities.





(7) DVT prophylaxis


Status:  Acute


Assessment & Plan:  Enoxaparin








Clinical Quality Measures


DVT/VTE Risk/Contraindication:


Risk Factor Score Per Nursin


RFS Level Per Nursing on Admit:  4+=Very High











DUANE CAGE MD             Nov 10, 2020 09:55

## 2020-11-10 NOTE — PHYSICAL THERAPY PROGRESS NOTE
Therapy Progress Note


Patient currently on Hold per RN due to not responding to cues and continued 

intubation.  PT will continue to monitor patient status.











WILLIAM SAAVEDRA PT              Nov 10, 2020 11:49

## 2020-11-10 NOTE — NUR
CM/SS following with patient for social service consult. 



CM/SS received consult for DPOA. CM/SS waiting for patient to be extubated, alert, and 
oriented to discuss DPOA. 



CM/SS contacted Mariely ([phone]) to give an update. This sw introduced self and 
explained role. Mariely verbalized understanding. 



Mariely reports the patient did well when he got home from [facility]. He got a new house 
right behind her and was sober. However, he started drinking again. 



She did not have any questions or concerns at this time.

## 2020-11-10 NOTE — NUR
THIS NURSE NOTIFIED DR VASQUEZ PT IS STILL RESTLESS IN BED AND NOT FOLLOWING COMMANDS. DR VASQUEZ AT BEDSIDE. NO NEW ORDERS AT THIS TIME. WILL CONTINUE TO MONITOR.

## 2020-11-10 NOTE — NUR
THIS NURSE NOTIFIED DR VASQUEZ PT HR HAS BEEN 45-60 AND SBP HAS BEEN 190-200. PT IS STILL NOT 
FOLLOWING  COMMANDS BUT PT IS VERY RESTLESS IN BED. PLAN IS STILL TO EXTUBATE IF PT FOLLOWS 
COMMANDS. NO NEW ORDERS AT THIS TIME. WILL CONTINUE TO MONITOR.

## 2020-11-10 NOTE — NUR
THIS NURSE NOTIFIED DR ESPSOITO WITH EICU PT HR HAS BEEN 130-140S AND SBP -200. PT HAS 
BEEN OFF OF SEDATION SINCE YESTERDAY BUT IS NOT FOLLOWING COMMANDS. PT IS VERY RESTLESS AND 
AGITATED IN BED. ORDER GIVEN FOR ATIVAN. SEE ORDER HX. WILL CONTINUE TO MONITOR.

## 2020-11-11 VITALS — SYSTOLIC BLOOD PRESSURE: 163 MMHG | DIASTOLIC BLOOD PRESSURE: 74 MMHG

## 2020-11-11 VITALS — DIASTOLIC BLOOD PRESSURE: 96 MMHG | SYSTOLIC BLOOD PRESSURE: 153 MMHG

## 2020-11-11 VITALS — DIASTOLIC BLOOD PRESSURE: 56 MMHG | SYSTOLIC BLOOD PRESSURE: 93 MMHG

## 2020-11-11 VITALS — DIASTOLIC BLOOD PRESSURE: 76 MMHG | SYSTOLIC BLOOD PRESSURE: 157 MMHG

## 2020-11-11 VITALS — SYSTOLIC BLOOD PRESSURE: 135 MMHG | DIASTOLIC BLOOD PRESSURE: 77 MMHG

## 2020-11-11 VITALS — DIASTOLIC BLOOD PRESSURE: 94 MMHG | SYSTOLIC BLOOD PRESSURE: 185 MMHG

## 2020-11-11 LAB
ARTERIAL PATENCY WRIST A: (no result)
BASE EXCESS STD BLDA CALC-SCNC: -1.7 MMOL/L (ref -2.5–2.5)
BDY SITE: (no result)
BODY TEMPERATURE: 37.1
BUN/CREAT SERPL: 5
CALCIUM SERPL-MCNC: 8.1 MG/DL (ref 8.5–10.1)
CHLORIDE SERPL-SCNC: 113 MMOL/L (ref 98–107)
CO2 BLDA CALC-SCNC: 22.5 MMOL/L (ref 21–31)
CO2 SERPL-SCNC: 21 MMOL/L (ref 21–32)
CREAT SERPL-MCNC: 0.75 MG/DL (ref 0.6–1.3)
GFR SERPLBLD BASED ON 1.73 SQ M-ARVRAT: > 60 ML/MIN
GLUCOSE SERPL-MCNC: 104 MG/DL (ref 70–105)
HCT VFR BLD CALC: 32 % (ref 40–54)
HGB BLD-MCNC: 10.2 G/DL (ref 13.3–17.7)
INHALED O2 FLOW RATE: 21 L/MIN
MAGNESIUM SERPL-MCNC: 1.9 MG/DL (ref 1.6–2.4)
MCH RBC QN AUTO: 28 PG (ref 25–34)
MCHC RBC AUTO-ENTMCNC: 32 G/DL (ref 32–36)
MCV RBC AUTO: 87 FL (ref 80–99)
PCO2 BLDA: 31 MMHG (ref 35–45)
PH BLDA: 7.46 [PH] (ref 7.37–7.43)
PHOSPHATE SERPL-MCNC: 3.2 MG/DL (ref 2.3–4.7)
PLATELET # BLD: 167 10^3/UL (ref 130–400)
PMV BLD AUTO: 10.6 FL (ref 9–12.2)
PO2 BLDA: 100 MMHG (ref 79–93)
POTASSIUM SERPL-SCNC: 3.3 MMOL/L (ref 3.6–5)
SAO2 % BLDA FROM PO2: 100 % (ref 94–100)
SODIUM SERPL-SCNC: 142 MMOL/L (ref 135–145)
TRIGL SERPL-MCNC: 49 MG/DL (ref ?–150)
VENTILATION MODE VENT: YES
WBC # BLD AUTO: 7.1 10^3/UL (ref 4.3–11)

## 2020-11-11 RX ADMIN — LACTULOSE SCH GM: 20 SOLUTION ORAL at 07:49

## 2020-11-11 RX ADMIN — RIFAXIMIN SCH MG: 550 TABLET ORAL at 20:34

## 2020-11-11 RX ADMIN — DEXTROSE MONOHYDRATE AND SODIUM CHLORIDE SCH MLS/HR: 5; .45 INJECTION, SOLUTION INTRAVENOUS at 05:13

## 2020-11-11 RX ADMIN — HYDRALAZINE HYDROCHLORIDE PRN MG: 20 INJECTION INTRAMUSCULAR; INTRAVENOUS at 23:36

## 2020-11-11 RX ADMIN — INSULIN ASPART SCH UNIT: 100 INJECTION, SOLUTION INTRAVENOUS; SUBCUTANEOUS at 17:49

## 2020-11-11 RX ADMIN — INSULIN ASPART SCH UNIT: 100 INJECTION, SOLUTION INTRAVENOUS; SUBCUTANEOUS at 05:13

## 2020-11-11 RX ADMIN — LACTULOSE SCH GM: 20 SOLUTION ORAL at 20:37

## 2020-11-11 RX ADMIN — SODIUM CHLORIDE SCH MLS/HR: 900 INJECTION INTRAVENOUS at 05:04

## 2020-11-11 RX ADMIN — INSULIN ASPART SCH UNIT: 100 INJECTION, SOLUTION INTRAVENOUS; SUBCUTANEOUS at 12:19

## 2020-11-11 RX ADMIN — POTASSIUM CHLORIDE SCH MLS/HR: 200 INJECTION, SOLUTION INTRAVENOUS at 07:49

## 2020-11-11 RX ADMIN — FAMOTIDINE SCH MG: 10 INJECTION INTRAVENOUS at 20:37

## 2020-11-11 RX ADMIN — SODIUM CHLORIDE SCH MLS/HR: 900 INJECTION INTRAVENOUS at 11:34

## 2020-11-11 RX ADMIN — DEXTROSE MONOHYDRATE AND SODIUM CHLORIDE SCH MLS/HR: 5; .45 INJECTION, SOLUTION INTRAVENOUS at 23:45

## 2020-11-11 RX ADMIN — POTASSIUM CHLORIDE SCH MLS/HR: 200 INJECTION, SOLUTION INTRAVENOUS at 08:37

## 2020-11-11 RX ADMIN — RIFAXIMIN SCH MG: 550 TABLET ORAL at 07:50

## 2020-11-11 RX ADMIN — ALBUTEROL SULFATE SCH MG: 2.5 SOLUTION RESPIRATORY (INHALATION) at 18:50

## 2020-11-11 RX ADMIN — SODIUM CHLORIDE SCH MLS/HR: 900 INJECTION INTRAVENOUS at 00:12

## 2020-11-11 RX ADMIN — ENOXAPARIN SODIUM SCH MG: 100 INJECTION SUBCUTANEOUS at 05:04

## 2020-11-11 RX ADMIN — FAMOTIDINE SCH MG: 10 INJECTION INTRAVENOUS at 08:01

## 2020-11-11 RX ADMIN — DEXTROSE MONOHYDRATE AND SODIUM CHLORIDE SCH MLS/HR: 5; .45 INJECTION, SOLUTION INTRAVENOUS at 18:57

## 2020-11-11 RX ADMIN — ENOXAPARIN SODIUM SCH MG: 100 INJECTION SUBCUTANEOUS at 17:49

## 2020-11-11 RX ADMIN — SODIUM CHLORIDE SCH MLS/HR: 900 INJECTION INTRAVENOUS at 17:55

## 2020-11-11 RX ADMIN — ALBUTEROL SULFATE SCH MG: 2.5 SOLUTION RESPIRATORY (INHALATION) at 16:07

## 2020-11-11 RX ADMIN — POTASSIUM CHLORIDE SCH MEQ: 1500 TABLET, EXTENDED RELEASE ORAL at 03:31

## 2020-11-11 RX ADMIN — POTASSIUM CHLORIDE SCH MLS/HR: 200 INJECTION, SOLUTION INTRAVENOUS at 06:15

## 2020-11-11 RX ADMIN — INSULIN ASPART SCH UNIT: 100 INJECTION, SOLUTION INTRAVENOUS; SUBCUTANEOUS at 23:37

## 2020-11-11 RX ADMIN — POTASSIUM CHLORIDE SCH MLS/HR: 200 INJECTION, SOLUTION INTRAVENOUS at 08:36

## 2020-11-11 RX ADMIN — DEXTROSE MONOHYDRATE AND SODIUM CHLORIDE SCH MLS/HR: 5; .45 INJECTION, SOLUTION INTRAVENOUS at 11:34

## 2020-11-11 RX ADMIN — ASCORBIC ACID, VITAMIN A PALMITATE, CHOLECALCIFEROL, THIAMINE HYDROCHLORIDE, RIBOFLAVIN-5 PHOSPHATE SODIUM, PYRIDOXINE HYDROCHLORIDE, NIACINAMIDE, DEXPANTHENOL, ALPHA-TOCOPHEROL ACETATE, VITAMIN K1, FOLIC ACID, BIOTIN, CYANOCOBALAMIN SCH MLS/HR: 200; 3300; 200; 6; 3.6; 6; 40; 15; 10; 150; 600; 60; 5 INJECTION, SOLUTION INTRAVENOUS at 09:40

## 2020-11-11 RX ADMIN — POTASSIUM CHLORIDE SCH MLS/HR: 200 INJECTION, SOLUTION INTRAVENOUS at 03:31

## 2020-11-11 RX ADMIN — METOPROLOL TARTRATE SCH MG: 25 TABLET, FILM COATED ORAL at 07:50

## 2020-11-11 RX ADMIN — METOPROLOL TARTRATE SCH MG: 25 TABLET, FILM COATED ORAL at 20:34

## 2020-11-11 RX ADMIN — METRONIDAZOLE SCH MLS/HR: 5 INJECTION, SOLUTION INTRAVENOUS at 08:00

## 2020-11-11 RX ADMIN — METRONIDAZOLE SCH MLS/HR: 5 INJECTION, SOLUTION INTRAVENOUS at 20:38

## 2020-11-11 RX ADMIN — SODIUM CHLORIDE SCH MLS/HR: 900 INJECTION INTRAVENOUS at 23:44

## 2020-11-11 RX ADMIN — POTASSIUM CHLORIDE SCH MLS/HR: 200 INJECTION, SOLUTION INTRAVENOUS at 04:36

## 2020-11-11 RX ADMIN — MAGNESIUM SULFATE IN DEXTROSE SCH MLS/HR: 10 INJECTION, SOLUTION INTRAVENOUS at 03:31

## 2020-11-11 NOTE — NUR
ATTEMPTED TO CALL REPORT TO [facility]IA [facility] NO ANSWER

-------------------------------------------------------------------------------

Addendum: 11/11/20 at 1816 by HUSEYIN GARCIA RN

-------------------------------------------------------------------------------

WRONG PT
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
## 2020-11-11 NOTE — NUR
Sedation discontinued per . 0450 this nurse asked  to come to patients 
room, order received to extubate, patient is following commands. This nurse called RT to 
update her on 's order to extubate. 

RT to patients room. Patient extubated at 0500. Patient placed on 2L NC att. VSS. 

Will continue to closely monitor.
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
## 2020-11-11 NOTE — DIAGNOSTIC IMAGING REPORT
INDICATION:  Respiratory failure, mechanical ventilation.



TECHNIQUE:  Single view chest 3:16 AM.



CORRELATION STUDY:  11/10/2020



FINDINGS: 

Endotracheal tube tip projecting over the trachea below the

clavicles above the watson. Gastric tube passes below the left

hemidiaphragm. Right IJ central line tip at the low SVC, stable.

Heart size and mediastinum unchanged. Vasculature overall appears

less pronounced from prior.  

Left lung base largely obscured by the cardiac enlargement. Overt

infiltrate does not appear to be suggested.



IMPRESSION: 

1. Stable support lines and tubes. Vasculature overall perhaps

slightly less congested.

2. No definitive consolidating infiltrate.



Dictated by: 



  Dictated on workstation # [id number]

## 2020-11-11 NOTE — NUR
ATTEMPTED TO CALL REPORT TO VIA B[facility] L[facility]BERT WHO REQUESTED THIS RN TO CALL BACK 

-------------------------------------------------------------------------------

Addendum: 11/11/20 at 1816 by HUSEYIN GARCIA RN

-------------------------------------------------------------------------------

WRONG PT

## 2020-11-11 NOTE — NUR
ATTEMPTED TO CALL REPORT.  ASKED THIS RN TO CALL BACK

-------------------------------------------------------------------------------

Addendum: 11/11/20 at 1816 by HUSEYIN GARCIA RN

-------------------------------------------------------------------------------

WRONG PT
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
## 2020-11-11 NOTE — PROGRESS NOTE
Subjective


Subjective/Events-last exam


Seen at 0800. Extubated this morning, answers some questions but remains 

somewhat somnolent and doesn't follow instructions for neuro exam.





Objective


Exam


Last Set of Vital Signs





Vital Signs








  Date Time  Temp Pulse Resp B/P (MAP) Pulse Ox O2 Delivery O2 Flow Rate FiO2


 


20 21:00     100 Nasal Cannula 3.00 


 


20 19:39  72      


 


20 19:19 37.2  18 153/96 (115)    


 


20 11:00        21





Capillary Refill : Less Than 3 Seconds


I&O











Intake and Output 


 


 20





 00:00


 


Intake Total 1110 ml


 


Output Total 5375 ml


 


Balance -4265 ml


 


 


 


Intake Oral 0 ml


 


IV Total 1110 ml


 


Output Urine Total 5175 ml


 


Stool Total 200 ml








General:  Other (somnolent)


Lungs:  Clear to Auscultation, Normal Air Movement


Heart:  Regular Rate, No Murmurs


Abdomen:  Normal Bowel Sounds, Soft


Neuro:  Other (oriented to self and knows he is in the hospital, unable to 

follow instructions for full neuro exam)





Results/Procedures


Lab


Laboratory Tests


11/10/20 23:36: Glucometer 103


20 02:38: 


White Blood Count 7.1, Red Blood Count 3.67L, Hemoglobin 10.2L, Hematocrit 32L, 

Mean Corpuscular Volume 87, Mean Corpuscular Hemoglobin 28, Mean Corpuscular 

Hemoglobin Concent 32, Red Cell Distribution Width 17.2H, Platelet Count 167, 

Mean Platelet Volume 10.6, Sodium Level 142, Potassium Level 3.3L, Chloride 

Level 113H, Carbon Dioxide Level 21, Anion Gap 8, Blood Urea Nitrogen 4L, 

Creatinine 0.75, Estimat Glomerular Filtration Rate > 60, BUN/Creatinine Ratio 

5, Glucose Level 104, Calcium Level 8.1L, Phosphorus Level 3.2, Magnesium Level 

1.9, Triglycerides Level 49


20 02:45: 


Blood Gas Puncture Site LEFT RADIAL, Blood Gas Patient Temperature 37.1, 

Arterial Blood pH 7.46H, Arterial Blood Partial Pressure CO2 31L, Arterial Blood

Partial Pressure O2 100H, Arterial Blood HCO3 22L, Arterial Blood Total CO2 

22.5, Arterial Blood Oxygen Saturation 100, Arterial Blood Base Excess -1.7, 

Mazin Test UNKNOWN, Blood Gas Ventilator Setting YES, Blood Gas Inspired Oxygen 

21


20 11:56: Glucometer 94


11/11/20 17:05: Glucometer 74





Microbiology


20 Blood Culture - Preliminary, Resulted


          No growth


20 MRSA Screen - Final, Complete


          MRSA not isolated





Assessment/Plan


Assessment/Plan





(1) Hypertension


Status:  Chronic


Assessment & Plan:  BP elevated 11/10, hydralazine prn while intubated. Monitor 

for need for ativan as BP elevation may also be manifestation of EtOH 

withdrawal.


 remains elevated, but not high enough to require prn hydralazine, monitor


Qualifiers:  


   Qualified Codes:  I10 - Essential (primary) hypertension


(2) Hypokalemia


Status:  Resolved


(3) Respiratory acidosis


Status:  Resolved


Assessment & Plan:  Initially with low pH and elevated CO2, now with slightly 

elevated pH and low CO2, extubated  am.





(4) Alcohol intoxication


Status:  Acute


Assessment & Plan:  Monitor for withdrawal symptoms, has had significant 

agitation/aggression with withdrawal in the past. Receiving banana bag daily.


11/10- off sedation, but remains altered, EtOH level negative.


Qualifiers:  


   Qualified Codes:  F10.929 - Alcohol use, unspecified with intoxication, 

unspecified


(5) Acute respiratory failure


Status:  Acute


Assessment & Plan:  Plan for extubation today, improving. Appreciate Dr. Wang's recommendations.


11/10- remains intubated due to not following directions or responding off 

sedation.


Qualifiers:  


   Qualified Codes:  J96.02 - Acute respiratory failure with hypercapnia


(6) Acute metabolic encephalopathy


Status:  Acute


Assessment & Plan:  Ammonia elevated, lactulose and rifaximin started.


11/10- remains minimally responsive, continue lactulose, rifaximin. Head CT 

without acute abnormalities.


 extubated and more alert but still not oriented, continue rifaximin, 

lactulose





(7) DVT prophylaxis


Status:  Acute


Assessment & Plan:  Enoxaparin








Clinical Quality Measures


DVT/VTE Risk/Contraindication:


Risk Factor Score Per Nursin


RFS Level Per Nursing on Admit:  4+=Very High











DUANE CAGE MD             2020 21:30

## 2020-11-12 VITALS — DIASTOLIC BLOOD PRESSURE: 75 MMHG | SYSTOLIC BLOOD PRESSURE: 164 MMHG

## 2020-11-12 VITALS — DIASTOLIC BLOOD PRESSURE: 75 MMHG | SYSTOLIC BLOOD PRESSURE: 121 MMHG

## 2020-11-12 VITALS — DIASTOLIC BLOOD PRESSURE: 87 MMHG | SYSTOLIC BLOOD PRESSURE: 147 MMHG

## 2020-11-12 VITALS — SYSTOLIC BLOOD PRESSURE: 117 MMHG | DIASTOLIC BLOOD PRESSURE: 75 MMHG

## 2020-11-12 VITALS — SYSTOLIC BLOOD PRESSURE: 129 MMHG | DIASTOLIC BLOOD PRESSURE: 83 MMHG

## 2020-11-12 LAB
BUN/CREAT SERPL: 4
CALCIUM SERPL-MCNC: 8.5 MG/DL (ref 8.5–10.1)
CHLORIDE SERPL-SCNC: 110 MMOL/L (ref 98–107)
CO2 SERPL-SCNC: 23 MMOL/L (ref 21–32)
CREAT SERPL-MCNC: 0.75 MG/DL (ref 0.6–1.3)
GFR SERPLBLD BASED ON 1.73 SQ M-ARVRAT: > 60 ML/MIN
GLUCOSE SERPL-MCNC: 90 MG/DL (ref 70–105)
HCT VFR BLD CALC: 36 % (ref 40–54)
HGB BLD-MCNC: 11.7 G/DL (ref 13.3–17.7)
MAGNESIUM SERPL-MCNC: 1.7 MG/DL (ref 1.6–2.4)
MCH RBC QN AUTO: 28 PG (ref 25–34)
MCHC RBC AUTO-ENTMCNC: 32 G/DL (ref 32–36)
MCV RBC AUTO: 87 FL (ref 80–99)
PHOSPHATE SERPL-MCNC: 3 MG/DL (ref 2.3–4.7)
PLATELET # BLD: 187 10^3/UL (ref 130–400)
PMV BLD AUTO: 10.6 FL (ref 9–12.2)
POTASSIUM SERPL-SCNC: 3.3 MMOL/L (ref 3.6–5)
SODIUM SERPL-SCNC: 142 MMOL/L (ref 135–145)
WBC # BLD AUTO: 10 10^3/UL (ref 4.3–11)

## 2020-11-12 RX ADMIN — DIVALPROEX SODIUM SCH MG: 250 TABLET, FILM COATED, EXTENDED RELEASE ORAL at 20:55

## 2020-11-12 RX ADMIN — POTASSIUM CHLORIDE SCH MEQ: 1500 TABLET, EXTENDED RELEASE ORAL at 05:32

## 2020-11-12 RX ADMIN — FAMOTIDINE SCH MG: 10 INJECTION INTRAVENOUS at 08:54

## 2020-11-12 RX ADMIN — DEXTROSE MONOHYDRATE AND SODIUM CHLORIDE SCH MLS/HR: 5; .45 INJECTION, SOLUTION INTRAVENOUS at 23:11

## 2020-11-12 RX ADMIN — HYDROCHLOROTHIAZIDE SCH MG: 25 TABLET ORAL at 08:54

## 2020-11-12 RX ADMIN — ASCORBIC ACID, VITAMIN A PALMITATE, CHOLECALCIFEROL, THIAMINE HYDROCHLORIDE, RIBOFLAVIN-5 PHOSPHATE SODIUM, PYRIDOXINE HYDROCHLORIDE, NIACINAMIDE, DEXPANTHENOL, ALPHA-TOCOPHEROL ACETATE, VITAMIN K1, FOLIC ACID, BIOTIN, CYANOCOBALAMIN SCH MLS/HR: 200; 3300; 200; 6; 3.6; 6; 40; 15; 10; 150; 600; 60; 5 INJECTION, SOLUTION INTRAVENOUS at 09:42

## 2020-11-12 RX ADMIN — ALBUTEROL SULFATE SCH MG: 2.5 SOLUTION RESPIRATORY (INHALATION) at 02:02

## 2020-11-12 RX ADMIN — INSULIN ASPART SCH UNIT: 100 INJECTION, SOLUTION INTRAVENOUS; SUBCUTANEOUS at 18:23

## 2020-11-12 RX ADMIN — RIFAXIMIN SCH MG: 550 TABLET ORAL at 20:55

## 2020-11-12 RX ADMIN — LORAZEPAM PRN MG: 0.5 TABLET ORAL at 11:39

## 2020-11-12 RX ADMIN — METRONIDAZOLE SCH MLS/HR: 5 INJECTION, SOLUTION INTRAVENOUS at 21:05

## 2020-11-12 RX ADMIN — SODIUM CHLORIDE SCH MLS/HR: 900 INJECTION INTRAVENOUS at 05:16

## 2020-11-12 RX ADMIN — DEXTROSE MONOHYDRATE AND SODIUM CHLORIDE SCH MLS/HR: 5; .45 INJECTION, SOLUTION INTRAVENOUS at 06:34

## 2020-11-12 RX ADMIN — ACETAMINOPHEN PRN MG: 325 TABLET ORAL at 22:23

## 2020-11-12 RX ADMIN — ENOXAPARIN SODIUM SCH MG: 100 INJECTION SUBCUTANEOUS at 18:23

## 2020-11-12 RX ADMIN — ALBUTEROL SULFATE SCH MG: 2.5 SOLUTION RESPIRATORY (INHALATION) at 08:10

## 2020-11-12 RX ADMIN — METRONIDAZOLE SCH MLS/HR: 5 INJECTION, SOLUTION INTRAVENOUS at 08:29

## 2020-11-12 RX ADMIN — POTASSIUM CHLORIDE SCH MLS/HR: 200 INJECTION, SOLUTION INTRAVENOUS at 05:01

## 2020-11-12 RX ADMIN — INSULIN ASPART SCH UNIT: 100 INJECTION, SOLUTION INTRAVENOUS; SUBCUTANEOUS at 11:15

## 2020-11-12 RX ADMIN — INSULIN ASPART SCH UNIT: 100 INJECTION, SOLUTION INTRAVENOUS; SUBCUTANEOUS at 05:05

## 2020-11-12 RX ADMIN — QUETIAPINE FUMARATE SCH MG: 25 TABLET, FILM COATED ORAL at 20:55

## 2020-11-12 RX ADMIN — DEXTROSE MONOHYDRATE AND SODIUM CHLORIDE SCH MLS/HR: 5; .45 INJECTION, SOLUTION INTRAVENOUS at 14:55

## 2020-11-12 RX ADMIN — QUETIAPINE FUMARATE SCH MG: 25 TABLET, FILM COATED ORAL at 12:35

## 2020-11-12 RX ADMIN — METOPROLOL TARTRATE SCH MG: 25 TABLET, FILM COATED ORAL at 20:55

## 2020-11-12 RX ADMIN — SODIUM CHLORIDE SCH MLS/HR: 900 INJECTION INTRAVENOUS at 18:23

## 2020-11-12 RX ADMIN — ENOXAPARIN SODIUM SCH MG: 100 INJECTION SUBCUTANEOUS at 05:16

## 2020-11-12 RX ADMIN — ALBUTEROL SULFATE SCH MG: 2.5 SOLUTION RESPIRATORY (INHALATION) at 21:49

## 2020-11-12 RX ADMIN — SODIUM CHLORIDE SCH MLS/HR: 900 INJECTION INTRAVENOUS at 11:14

## 2020-11-12 RX ADMIN — RIFAXIMIN SCH MG: 550 TABLET ORAL at 08:54

## 2020-11-12 RX ADMIN — MAGNESIUM SULFATE IN DEXTROSE SCH MLS/HR: 10 INJECTION, SOLUTION INTRAVENOUS at 05:33

## 2020-11-12 RX ADMIN — FAMOTIDINE SCH MG: 10 INJECTION INTRAVENOUS at 20:51

## 2020-11-12 RX ADMIN — METOPROLOL TARTRATE SCH MG: 25 TABLET, FILM COATED ORAL at 08:54

## 2020-11-12 RX ADMIN — LORAZEPAM PRN MG: 0.5 TABLET ORAL at 23:08

## 2020-11-12 RX ADMIN — LACTULOSE SCH GM: 20 SOLUTION ORAL at 20:54

## 2020-11-12 RX ADMIN — LACTULOSE SCH GM: 20 SOLUTION ORAL at 08:53

## 2020-11-12 RX ADMIN — ALBUTEROL SULFATE SCH MG: 2.5 SOLUTION RESPIRATORY (INHALATION) at 14:56

## 2020-11-12 NOTE — OCCUPATIONAL THER DAILY NOTE
OT Current Status-Daily Note


Subjective


Pt alert, lying in bed.  Sitter present in room.  Pt seeing sister out window 

and asking if it was raining.  Pt agrees to therapy.





Mental Status/Objective


Patient Orientation:  Person, Confused


Attachments:  Hankins Catheter, IV, Telemetry





ADL-Treatment


Therapy Code Descriptions/Definitions 





Functional [place] Measure:


0=Not Assessed/NA        4=Minimal Assistance


1=Total Assistance        5=Supervision or Setup


2=Maximal Assistance  6=Modified [place]


3=Moderate Assistance 7=Complete IndependenceSCALE: Activities may be completed 

with or without assistive devices.





6-Indepedent-patient completes the activity by him/herself with no assistance 

from a helper.


5-Set-up or Clean-up Assistance-helper sets up or cleans up; patient completes 

activity. [place] assists only prior to or  


    following the activity.


4-Supervision or Touching Assistance-helper provides verbal cues and/or 

touching/steadying and/or contact guard assistance as patient completes 

activity. Assistance may be provided   


    throughout the activity or intermittently.


3-Partial/Moderate Assistance-helper does LESS THAN HALF the effort. [place] 

lifts, holds or supports trunk or limbs, but provides less than half the effort.


2-Substantial/Maximal Assistance-helper does MORE THAN HALF the effort. [place] 

lifts or holds trunk or limbs and provides more than half the effort.


5-Llzcxmsvw-[id number] does ALL the effort. Patient does none of the effort to compl

ete the activity. Or, the assistance of 2 or more helpers is required for the 

patient to complete the  


    activity.


If activity was not attempted, code reason:


7-Patient Refused.


9-Not Applicable-not attempted and the patient did not perform the activity 

before the current illness, exacerbation or injury.


10-Not Attempted due to Environmental Limitations-(lack of equipment, weather 

restraints, etc.).


88-Not Attempted due to Medical Conditions or Safety Concerns.





Other Treatment


Co-treat with PT for skilled care due to decreased mental status, mobility x3 

and dependent ADLs.  PT focusing on mobility while OT focusing on ADLs with 

mobility.  Assist x3 for all mobility and ADLs.  Pt incontinent of bowel.  

Dependent with toileting.  Assist x3 to transfer to BSC and back to bed.  After 

session, pt lying in bed with call light and sitter present.  All needs met in r

oom.





OT Short Term Goals


Short Term Goals


Time Frame:  Nov 18, 2020


Eating:  3


Oral hygiene:  3





OT Long Term Goals


Long Term Goals


Time Frame:  Dec 2, 2020


Eating (QC):  4


Oral Hygiene (QC):  4


Toileting Hygiene (QC):  3


Upper Body Dressing (QC):  4


Additional Goals:  1-Demonstrate ADL Tasks, 2-Verbalize Understanding, 3-

ImproveStrength/Rosie


1=Demonstrate adherence to instructed precautions during ADL tasks.


2=Patient will verbalize/demonstrate understanding of assistive 

devices/modifications for ADL.


3=Patient will improve strength/tolerance for activity to enable patient to 

perform ADL's.





OT Education/Plan


Problem List/Assessment


Assessment:  Decreased Activ Tolerance, Dependent Transfers, Impaired Bed 

Mobility, Impaired Cognition, Impaired Self-Care Skills





Discharge Recommendations


Plan/Recommendations:  Continue POC





Treatment Plan/Plan of Care


Patient would benefit from OT for education, treatment and training to promote 

independence in ADL's, mobility, safety and/or upper extremity function for 

ADL's.


Plan of Care:  ADL Retraining, Functional Mobility, UE Funct Exercise/Act


Treatment Duration:  Dec 2, 2020


Frequency:  5 times per week


Estimated Hrs Per Day:  .25 hour per day


Agreement:  Yes


Rehab Potential:  Guarded





Time/GCodes


Start Time:  10:36


Stop Time:  10:59


Total Time Billed (hr/min):  23


Billed Treatment Time


1 visit-FA 2 (23 min)











JOE KOVACS               Nov 12, 2020 11:12

## 2020-11-12 NOTE — PHYSICAL THERAPY DAILY NOTE
PT Daily Note-Current


Subjective


Patient is very confused.  CoTreat with OT due to complexity of status.





Mental Status


Patient Orientation:  Confused


Attachments:  IV





Transfers


SCALE: Activities may be completed with or without assistive devices.





6-Indepedent-patient completes the activity by him/herself with no assistance 

from a helper.


5-Set-up or Clean-up Assistance-helper sets up or cleans up; patient completes 

activity. [place] assists only prior to or  


    following the activity.


4-Supervision or Touching Assistance-helper provides verbal cues and/or 

touching/steadying and/or contact guard assistance as patient completes 

activity. Assistance may be provided   


    throughout the activity or intermittently.


3-Partial/Moderate Assistance-helper does LESS THAN HALF the effort. [place] 

lifts, holds or supports trunk or limbs, but provides less than half the effort.


2-Substantial/Maximal Assistance-helper does MORE THAN HALF the effort. [place] 

lifts or holds trunk or limbs and provides more than half the effort.


8-Ktcblusri-yndeho does ALL the effort. Patient does none of the effort to 

complete the activity. Or, the assistance of 2 or more helpers is required for 

the patient to complete the  


    activity.


If activity was not attempted, code reason:


7-Patient Refused.


9-Not Applicable-not attempted and the patient did not perform the activity 

before the current illness, exacerbation or injury.


10-Not Attempted due to Environmental Limitations-(lack of equipment, weather 

restraints, etc.).


88-Not Attempted due to Medical Conditions or Safety Concerns.


Roll Left & Right (QC):  1


Sit to Lying (QC):  1


Lying to Sitting/Side of Bed(Q:  1


Sit to Stand (QC):  2 (x 2 x 4 sets to FWW with SPT bed<>commode)


Toilet Transfer (QC):  2 (x 2)





Gait Training


Does the Patient Walk?:  Yes


Distance:  5 steps with FWW


Gait Persons Needed:  2


Gait Assistive Device:  FWW


unsteady





Exercises


Seated Therapy Exercises:  Ankle pumps, Long arc quads


Seated Reps:  15 (AAROM)





Assessment


Patient incontinent BM requiring dependent assist x 2 to perform sit to stand to

FWW and assist of 1 to cleanse.  Patient is impulsive with all mobility and has 

severely diminished safety awareness.  Patient returned to bed with all 4 rails 

up and alarm activated.  Sitter present.  PT/OT cotreat due to complexity of 

patient status and requiring the skills of 2 therapist to complete tasks.





PT Long Term Goals


Long Term Goals


PT Long Term Goals Time Frame:  Nov 18, 2020


Roll Left & Right (QC):  3


Sit to Lying (QC):  3


Lying-Sitting on Side/Bed(QC):  3


Sit to Stand (QC):  3


Chair/Bed-to-Chair Xfer(QC):  3


Walk 10 feet (QC):  3





PT Plan


Treatment/Plan


Treatment Plan:  Continue Plan of Care


Treatment Plan:  Bed Mobility, Education, Functional Activity Rosie, Functional 

Strength, Gait, Safety, Therapeutic Exercise, Transfers


Treatment Duration:  Nov 18, 2020


Frequency:  6 times per week


Estimated Hrs Per Day:  .25 hour per day


Patient and/or Family Agrees t:  Yes





Time/GCodes


Time In:  1036


Time Out:  1059


Total Billed Treatment Time:  23


Total Billed Treatment


1 visit


FA x 2 23 min











WILLIAM SAAVEDRA PT              Nov 12, 2020 11:59

## 2020-11-12 NOTE — NUR
RD ASSESSMENT 



PMHx: HTN; ETOH abuse; dementia; 



PT INTERACTION: Pt was awake and pleasant during nutrition assessment. Note pt has dementia, 
per chart review. Pt states current appetite is not good. Note PO intake of 50% x1meal, per 
chart review. Pt states following a regular diet at home, and has no issues with 
chewing/swallowing food. Pt states no recent issues with nausea, vomiting, constipation, or 
diarrhea. Note last BM was 11/11, and pt not currently on bowel regimen per chart review. Pt 
states recent weight loss as "I went from 390# to what I weigh now." Note recetn 52# wt loss 
x7mon, per chart review. 



ABNORMAL NUTRITION-RELATED LAB VALUES

LOW:  K 3.3; BUN 3; 

HIGH: Cl 110; 



Est. kcal needs: 4443-7324 kcal | 15-18 kcal/kg  

Est. Pro needs:   g Pro | 0.8-1.0 g Pro/kg 



PES STATEMENT: Inadequate oral intake (NI-2.1) related to loss of appetite and dementia, as 
evidenced by pt interview and PO intake 50% x1meal. 



INTERVENTION:  

Continue with current diet order of Regular diet. 

Pt may benefit from nutrition supplementation if PO intake declines. 

Will continue to follow and reassess as pt needs, intake, and status change. 





MARIA ISABEL De La Cruz, MS RD LD

## 2020-11-12 NOTE — PROGRESS NOTE
Subjective


Subjective/Events-last exam


Afebrile, more alert today. He states he is surprised he had a high alcohol 

level because he says he hasn't drank in a year. His IJ is bothering him, but he

denies other concerns.





Objective


Exam


Last Set of Vital Signs





Vital Signs








  Date Time  Temp Pulse Resp B/P (MAP) Pulse Ox O2 Delivery O2 Flow Rate FiO2


 


20 09:00      Room Air  


 


20 08:10     97   


 


20 07:50 35.8 66 22 147/87 (107)    


 


20 21:00       3.00 


 


20 11:00        21





Capillary Refill : Less Than 3 SecondsLess Than 3 Seconds


I&O











Intake and Output 


 


 20





 00:00


 


Intake Total 1500 ml


 


Output Total 51337 ml


 


Balance -9675 ml


 


 


 


Intake Oral 100 ml


 


IV Total 1400 ml


 


Output Urine Total 34368 ml


 


Stool Total 275 ml


 


# Bowel Movements 1








General:  Alert, No Acute Distress


Lungs:  Clear to Auscultation, Normal Air Movement


Heart:  Regular Rate, No Murmurs


Abdomen:  Normal Bowel Sounds, Soft


Neuro:  Normal Speech


Psych/Mental Status:  Other (oriented to self and knows he is in the hospital 

but not which one)





Results/Procedures


Lab


Laboratory Tests


20 11:56: Glucometer 94


20 17:05: Glucometer 74


20 23:34: Glucometer 108


20 04:20: 


White Blood Count 10.0, Red Blood Count 4.18L, Hemoglobin 11.7L, Hematocrit 36L,

Mean Corpuscular Volume 87, Mean Corpuscular Hemoglobin 28, Mean Corpuscular 

Hemoglobin Concent 32, Red Cell Distribution Width 16.5H, Platelet Count 187, 

Mean Platelet Volume 10.6, Sodium Level 142, Potassium Level 3.3L, Chloride 

Level 110H, Carbon Dioxide Level 23, Anion Gap 9, Blood Urea Nitrogen 3L, 

Creatinine 0.75, Estimat Glomerular Filtration Rate > 60, BUN/Creatinine Ratio 

4, Glucose Level 90, Calcium Level 8.5, Phosphorus Level 3.0, Magnesium Level 

1.7


20 11:14: Glucometer 102





Microbiology


20 Blood Culture - Preliminary, Resulted


          No growth


20 MRSA Screen - Final, Complete


          MRSA not isolated





Assessment/Plan


Assessment/Plan





(1) Hypertension


Status:  Chronic


Assessment & Plan:  BP elevated 11/10, hydralazine prn while intubated. Monitor 

for need for ativan as BP elevation may also be manifestation of EtOH 

withdrawal.


 remains elevated, but not high enough to require prn hydralazine, monitor


 still elevated on metoprolol, add HCTZ


Qualifiers:  


   Qualified Codes:  I10 - Essential (primary) hypertension


(2) Hypokalemia


Status:  Acute


Assessment & Plan:  Replace and recheck





(3) Respiratory acidosis


Status:  Resolved


Assessment & Plan:  Initially with low pH and elevated CO2, now with slightly 

elevated pH and low CO2, extubated  am.





(4) Alcohol intoxication


Status:  Resolved


Assessment & Plan:  Monitor for withdrawal symptoms, has had significant 

agitation/aggression with withdrawal in the past. Receiving banana bag daily.


11/10- off sedation, but remains altered, EtOH level negative.


 mental status markedly improved.


Qualifiers:  


   Qualified Codes:  F10.929 - Alcohol use, unspecified with intoxication, 

unspecified


(5) Acute respiratory failure


Status:  Acute


Assessment & Plan:  Plan for extubation today, improving. Appreciate Dr. Wang's recommendations.


11/10- remains intubated due to not following directions or responding off 

sedation.


Qualifiers:  


   Qualified Codes:  J96.02 - Acute respiratory failure with hypercapnia


(6) Acute metabolic encephalopathy


Status:  Acute


Assessment & Plan:  Ammonia elevated, lactulose and rifaximin started.


11/10- remains minimally responsive, continue lactulose, rifaximin. Head CT 

without acute abnormalities.


 extubated and more alert but still not oriented, continue rifaximin, lac

tulose


 oriented to self and location and close on dates, continue lactulose and 

rifaximin, possible d/c tomorrow





(7) DVT prophylaxis


Status:  Acute


Assessment & Plan:  Enoxaparin








Clinical Quality Measures


DVT/VTE Risk/Contraindication:


Risk Factor Score Per Nursin


RFS Level Per Nursing on Admit:  4+=Very High











DUANE CAGE MD             2020 11:38

## 2020-11-12 NOTE — OCCUPATIONAL THER DAILY NOTE
OT Current Status-Daily Note


Subjective


Pt alert, lying in bed.  Pt seeing sister out of window and asking if it was 

raining.  Pt agrees to therapy.  Sitter present.





Mental Status/Objective


Patient Orientation:  Person


Attachments:  Hankins Catheter, IV, Telemetry





ADL-Treatment


Therapy Code Descriptions/Definitions 





Functional [place] Measure:


0=Not Assessed/NA        4=Minimal Assistance


1=Total Assistance        5=Supervision or Setup


2=Maximal Assistance  6=Modified [place]


3=Moderate Assistance 7=Complete IndependenceSCALE: Activities may be completed 

with or without assistive devices.





6-Indepedent-patient completes the activity by him/herself with no assistance 

from a helper.


5-Set-up or Clean-up Assistance-helper sets up or cleans up; patient completes 

activity. [place] assists only prior to or  


    following the activity.


4-Supervision or Touching Assistance-helper provides verbal cues and/or 

touching/steadying and/or contact guard assistance as patient completes 

activity. Assistance may be provided   


    throughout the activity or intermittently.


3-Partial/Moderate Assistance-helper does LESS THAN HALF the effort. [place] 

lifts, holds or supports trunk or limbs, but provides less than half the effort.


2-Substantial/Maximal Assistance-helper does MORE THAN HALF the effort. [place] 

lifts or holds trunk or limbs and provides more than half the effort.


5-Cdjniaank-zzemad does ALL the effort. Patient does none of the effort to 

complete the activity. Or, the assistance of 2 or more helpers is required for 

the patient to complete the  


    activity.


If activity was not attempted, code reason:


7-Patient Refused.


9-Not Applicable-not attempted and the patient did not perform the activity 

before the current illness, exacerbation or injury.


10-Not Attempted due to Environmental Limitations-(lack of equipment, weather 

restraints, etc.).


88-Not Attempted due to Medical Conditions or Safety Concerns.





Other Treatment


Co-treat with PT for skilled care due to decreased mental status, mobility x3 

and dependent ADLs.  PT focusing on mobility while OT focusing on ADLs with 

mobility.  Assist x3 for all mobility and ADLs.  Pt incontinent of bowel.  

Dependent with toileting.  Assist x3 to transfer to [facility] and back to bed.  After 

session, pt lying in bed with call light and sitter present.  All needs met in 

room.





OT Short Term Goals


Short Term Goals


Time Frame:  Nov 18, 2020


Eating:  3


Oral hygiene:  3





OT Long Term Goals


Long Term Goals


Time Frame:  Dec 2, 2020


Eating (QC):  4


Oral Hygiene (QC):  4


Toileting Hygiene (QC):  3


Upper Body Dressing (QC):  4


Additional Goals:  1-Demonstrate ADL Tasks, 2-Verbalize Understanding, 3-

ImproveStrength/Rosie


1=Demonstrate adherence to instructed precautions during ADL tasks.


2=Patient will verbalize/demonstrate understanding of assistive 

devices/modifications for ADL.


3=Patient will improve strength/tolerance for activity to enable patient to 

perform ADL's.





OT Education/Plan


Problem List/Assessment


Assessment:  Decreased Activ Tolerance, Decreased UE Strength, Dependent 

Transfers, Impaired Bed Mobility, Impaired Cognition, Impaired Self-Care Skills





Discharge Recommendations


Plan/Recommendations:  Continue POC





Treatment Plan/Plan of Care


Patient would benefit from OT for education, treatment and training to promote 

independence in ADL's, mobility, safety and/or upper extremity function for 

ADL's.


Plan of Care:  ADL Retraining, Functional Mobility, UE Funct Exercise/Act


Treatment Duration:  Dec 2, 2020


Frequency:  5 times per week


Estimated Hrs Per Day:  .25 hour per day


Agreement:  Yes


Rehab Potential:  Guarded





Time/GCodes


Start Time:  10:36


Stop Time:  10:59


Total Time Billed (hr/min):  23


Billed Treatment Time


1 visit-FA 2 (23 min)











JOE KOVACS               Nov 12, 2020 11:15

## 2020-11-12 NOTE — NUR
CM/CHRIS visited with the patient for social service consult. 



The patient was sitting up in bed eating lunch at time of visit. The patient was pleasant 
during the interview. The patient is alert but confused and cannot track conversation. He 
did not know he was in the hospital. The patient kept asking why he was in a "trailer home". 
The patient could not answer the day, year, or place he was in and kept saying "I don't 
know, I'm in trouble." The patient was very fixated on how he was going to get back home. 
CM/CHRIS and sitter ensured him that a ride will be provided and he will not be driving. The 
patient did get upset easily but calmed down easily as well. 



JUAN RAMON/CHRIS is concerned for this patient discharging home due to confusion and only scoring a 1 
for transfers with physical therapy. JUAN RAMON/CHRIS will discuss with the physician and sister Mariely 
tomorrow.

## 2020-11-13 VITALS — DIASTOLIC BLOOD PRESSURE: 83 MMHG | SYSTOLIC BLOOD PRESSURE: 131 MMHG

## 2020-11-13 VITALS — SYSTOLIC BLOOD PRESSURE: 129 MMHG | DIASTOLIC BLOOD PRESSURE: 80 MMHG

## 2020-11-13 VITALS — DIASTOLIC BLOOD PRESSURE: 73 MMHG | SYSTOLIC BLOOD PRESSURE: 115 MMHG

## 2020-11-13 VITALS — SYSTOLIC BLOOD PRESSURE: 126 MMHG | DIASTOLIC BLOOD PRESSURE: 78 MMHG

## 2020-11-13 VITALS — DIASTOLIC BLOOD PRESSURE: 80 MMHG | SYSTOLIC BLOOD PRESSURE: 130 MMHG

## 2020-11-13 VITALS — SYSTOLIC BLOOD PRESSURE: 93 MMHG | DIASTOLIC BLOOD PRESSURE: 52 MMHG

## 2020-11-13 LAB
ALBUMIN SERPL-MCNC: 2.6 GM/DL (ref 3.2–4.5)
ALP SERPL-CCNC: 45 U/L (ref 40–136)
ALT SERPL-CCNC: 7 U/L (ref 0–55)
BILIRUB SERPL-MCNC: 0.5 MG/DL (ref 0.1–1)
BUN/CREAT SERPL: 5
CALCIUM SERPL-MCNC: 8.6 MG/DL (ref 8.5–10.1)
CHLORIDE SERPL-SCNC: 108 MMOL/L (ref 98–107)
CO2 SERPL-SCNC: 24 MMOL/L (ref 21–32)
CREAT SERPL-MCNC: 0.76 MG/DL (ref 0.6–1.3)
GFR SERPLBLD BASED ON 1.73 SQ M-ARVRAT: > 60 ML/MIN
GLUCOSE SERPL-MCNC: 95 MG/DL (ref 70–105)
HCT VFR BLD CALC: 35 % (ref 40–54)
HGB BLD-MCNC: 11.3 G/DL (ref 13.3–17.7)
MAGNESIUM SERPL-MCNC: 1.9 MG/DL (ref 1.6–2.4)
MCH RBC QN AUTO: 28 PG (ref 25–34)
MCHC RBC AUTO-ENTMCNC: 32 G/DL (ref 32–36)
MCV RBC AUTO: 88 FL (ref 80–99)
PHOSPHATE SERPL-MCNC: 3.3 MG/DL (ref 2.3–4.7)
PLATELET # BLD: 172 10^3/UL (ref 130–400)
PMV BLD AUTO: 10.8 FL (ref 9–12.2)
POTASSIUM SERPL-SCNC: 3.5 MMOL/L (ref 3.6–5)
PROT SERPL-MCNC: 5.7 GM/DL (ref 6.4–8.2)
SODIUM SERPL-SCNC: 141 MMOL/L (ref 135–145)
TRIGL SERPL-MCNC: 67 MG/DL (ref ?–150)
WBC # BLD AUTO: 13 10^3/UL (ref 4.3–11)

## 2020-11-13 RX ADMIN — DEXTROSE MONOHYDRATE AND SODIUM CHLORIDE SCH MLS/HR: 5; .45 INJECTION, SOLUTION INTRAVENOUS at 11:06

## 2020-11-13 RX ADMIN — DEXTROSE MONOHYDRATE AND SODIUM CHLORIDE SCH MLS/HR: 5; .45 INJECTION, SOLUTION INTRAVENOUS at 17:45

## 2020-11-13 RX ADMIN — ALBUTEROL SULFATE SCH MG: 2.5 SOLUTION RESPIRATORY (INHALATION) at 16:35

## 2020-11-13 RX ADMIN — ENOXAPARIN SODIUM SCH MG: 100 INJECTION SUBCUTANEOUS at 09:28

## 2020-11-13 RX ADMIN — ALBUTEROL SULFATE SCH MG: 2.5 SOLUTION RESPIRATORY (INHALATION) at 05:50

## 2020-11-13 RX ADMIN — RIFAXIMIN SCH MG: 550 TABLET ORAL at 09:24

## 2020-11-13 RX ADMIN — LACTULOSE SCH GM: 20 SOLUTION ORAL at 09:23

## 2020-11-13 RX ADMIN — LEVOTHYROXINE SODIUM SCH MCG: 100 TABLET ORAL at 06:56

## 2020-11-13 RX ADMIN — HYDROCHLOROTHIAZIDE SCH MG: 25 TABLET ORAL at 09:23

## 2020-11-13 RX ADMIN — INSULIN ASPART SCH UNIT: 100 INJECTION, SOLUTION INTRAVENOUS; SUBCUTANEOUS at 06:59

## 2020-11-13 RX ADMIN — DEXTROSE MONOHYDRATE AND SODIUM CHLORIDE SCH MLS/HR: 5; .45 INJECTION, SOLUTION INTRAVENOUS at 11:08

## 2020-11-13 RX ADMIN — DIVALPROEX SODIUM SCH MG: 250 TABLET, FILM COATED, EXTENDED RELEASE ORAL at 09:28

## 2020-11-13 RX ADMIN — INSULIN ASPART SCH UNIT: 100 INJECTION, SOLUTION INTRAVENOUS; SUBCUTANEOUS at 18:08

## 2020-11-13 RX ADMIN — ALBUTEROL SULFATE SCH MG: 2.5 SOLUTION RESPIRATORY (INHALATION) at 07:57

## 2020-11-13 RX ADMIN — INSULIN ASPART SCH UNIT: 100 INJECTION, SOLUTION INTRAVENOUS; SUBCUTANEOUS at 13:05

## 2020-11-13 RX ADMIN — PANTOPRAZOLE SODIUM SCH MG: 40 TABLET, DELAYED RELEASE ORAL at 09:24

## 2020-11-13 RX ADMIN — QUETIAPINE FUMARATE SCH MG: 25 TABLET, FILM COATED ORAL at 09:24

## 2020-11-13 RX ADMIN — LACTULOSE SCH GM: 20 SOLUTION ORAL at 20:51

## 2020-11-13 RX ADMIN — DIVALPROEX SODIUM SCH MG: 250 TABLET, FILM COATED, EXTENDED RELEASE ORAL at 20:52

## 2020-11-13 RX ADMIN — QUETIAPINE FUMARATE SCH MG: 25 TABLET, FILM COATED ORAL at 20:59

## 2020-11-13 RX ADMIN — SODIUM CHLORIDE SCH MLS/HR: 900 INJECTION INTRAVENOUS at 00:09

## 2020-11-13 RX ADMIN — FAMOTIDINE SCH MG: 20 TABLET, FILM COATED ORAL at 20:55

## 2020-11-13 RX ADMIN — MAGNESIUM SULFATE IN DEXTROSE SCH MLS/HR: 10 INJECTION, SOLUTION INTRAVENOUS at 06:01

## 2020-11-13 RX ADMIN — FAMOTIDINE SCH MG: 10 INJECTION INTRAVENOUS at 09:24

## 2020-11-13 RX ADMIN — DEXTROSE MONOHYDRATE AND SODIUM CHLORIDE SCH MLS/HR: 5; .45 INJECTION, SOLUTION INTRAVENOUS at 09:27

## 2020-11-13 RX ADMIN — ENOXAPARIN SODIUM SCH MG: 100 INJECTION SUBCUTANEOUS at 06:57

## 2020-11-13 RX ADMIN — FOLIC ACID SCH MG: 1 TABLET ORAL at 06:56

## 2020-11-13 RX ADMIN — INSULIN ASPART SCH UNIT: 100 INJECTION, SOLUTION INTRAVENOUS; SUBCUTANEOUS at 00:09

## 2020-11-13 RX ADMIN — POTASSIUM CHLORIDE SCH MEQ: 1500 TABLET, EXTENDED RELEASE ORAL at 06:04

## 2020-11-13 RX ADMIN — METOPROLOL TARTRATE SCH MG: 25 TABLET, FILM COATED ORAL at 09:23

## 2020-11-13 RX ADMIN — RIFAXIMIN SCH MG: 550 TABLET ORAL at 20:55

## 2020-11-13 RX ADMIN — METOPROLOL TARTRATE SCH MG: 25 TABLET, FILM COATED ORAL at 21:01

## 2020-11-13 RX ADMIN — POTASSIUM CHLORIDE SCH MLS/HR: 200 INJECTION, SOLUTION INTRAVENOUS at 06:03
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
## 2020-11-13 NOTE — OCCUPATIONAL THER DAILY NOTE
OT Current Status-Daily Note


Subjective


Pt alert, lying in bed.  Pt more agitated initially with movement.  Pt thinking 

that manuel was his sister.  Unable to assess pain.





Mental Status/Objective


Patient Orientation:  Person, Unable to Assess





ADL-Treatment


Therapy Code Descriptions/Definitions 





Functional [place] Measure:


0=Not Assessed/NA        4=Minimal Assistance


1=Total Assistance        5=Supervision or Setup


2=Maximal Assistance  6=Modified [place]


3=Moderate Assistance 7=Complete IndependenceSCALE: Activities may be completed 

with or without assistive devices.





6-Indepedent-patient completes the activity by him/herself with no assistance 

from a helper.


5-Set-up or Clean-up Assistance-helper sets up or cleans up; patient completes 

activity. [place] assists only prior to or  


    following the activity.


4-Supervision or Touching Assistance-helper provides verbal cues and/or 

touching/steadying and/or contact guard assistance as patient completes 

activity. Assistance may be provided   


    throughout the activity or intermittently.


3-Partial/Moderate Assistance-helper does LESS THAN HALF the effort. [place] 

lifts, holds or supports trunk or limbs, but provides less than half the effort.


2-Substantial/Maximal Assistance-helper does MORE THAN HALF the effort. [place] 

lifts or holds trunk or limbs and provides more than half the effort.


0-Iiryswhjj-clwwck does ALL the effort. Patient does none of the effort to 

complete the activity. Or, the assistance of 2 or more helpers is required for 

the patient to complete the  


    activity.


If activity was not attempted, code reason:


7-Patient Refused.


9-Not Applicable-not attempted and the patient did not perform the activity 

before the current illness, exacerbation or injury.


10-Not Attempted due to Environmental Limitations-(lack of equipment, weather 

restraints, etc.).


88-Not Attempted due to Medical Conditions or Safety Concerns.





Other Treatment


Co-treat with PT for skilled care due to impulsivity, mobility x2 and dependent 

ADLs.  PT focusing on mobility while OT focusing on ADLs with mobility.  Assist 

x2 for all mobility and ADLs due to impulsivity.  Assist x2 for supine to EOB, 

transfer using FWW to recliner.  Pt required assist for mobility and to manipu

late FWW with ambulation.  After session, pt sitting in recliner with call light

and sitter present.  All needs met in room.





OT Short Term Goals


Short Term Goals


Time Frame:  Nov 18, 2020


Eating:  3


Oral hygiene:  3





OT Long Term Goals


Long Term Goals


Time Frame:  Dec 2, 2020


Eating (QC):  4


Oral Hygiene (QC):  4


Toileting Hygiene (QC):  3


Upper Body Dressing (QC):  4


Additional Goals:  1-Demonstrate ADL Tasks, 2-Verbalize Understanding, 3-

ImproveStrength/Rosie


1=Demonstrate adherence to instructed precautions during ADL tasks.


2=Patient will verbalize/demonstrate understanding of assistive 

devices/modifications for ADL.


3=Patient will improve strength/tolerance for activity to enable patient to 

perform ADL's.





OT Education/Plan


Problem List/Assessment


Assessment:  Decreased Activ Tolerance, Decreased Safety Aware, Decreased UE 

Strength, Dependent Transfers, Impaired Bed Mobility, Impaired Cognition, 

Impaired Coordination, Impaired Funct Balance, Impaired I ADL's, Impaired Self-

Care Skills, Restricted Funct UE ROM





Discharge Recommendations


Plan/Recommendations:  Continue POC





Treatment Plan/Plan of Care


Patient would benefit from OT for education, treatment and training to promote 

independence in ADL's, mobility, safety and/or upper extremity function for 

ADL's.


Plan of Care:  ADL Retraining, Functional Mobility, UE Funct Exercise/Act


Treatment Duration:  Dec 2, 2020


Frequency:  5 times per week


Estimated Hrs Per Day:  .25 hour per day


Agreement:  Yes


Rehab Potential:  Guarded





Time/GCodes


Start Time:  11:20


Stop Time:  11:31


Total Time Billed (hr/min):  11


Billed Treatment Time


1 visit-FA 1 (11 min)  co-treat with PT











JOE KOVACS               Nov 13, 2020 13:46

## 2020-11-13 NOTE — NUR
CM/SS follow up. 



The patient will be staying the weekend. CM/SS contacted the patient's sister Mariely to 
discuss discharge plans. CM/SS informed her that patient was debilitated and needing a lot 
of assistance. She verbalized understanding. CM/SS discussed the possibility of senior care 
nursing home placement. She stated "he will not go to one". CM/SS informed her that patient 
is still confused and would not be safe to return home. Mariely picked [facility] to send a referral to. 



CM/SS contacted Beatriz from facility and faxed referral. Awaiting acceptance/denial.

## 2020-11-13 NOTE — PROGRESS NOTE
Subjective


Subjective/Events-last exam


Afebrile, no acute events. Still confused, and not doing much with physical 

therapy. He does agree that if he needed to go to a nursing home, he would be 

willing.





Objective


Exam


Last Set of Vital Signs





Vital Signs








  Date Time  Temp Pulse Resp B/P (MAP) Pulse Ox O2 Delivery O2 Flow Rate FiO2


 


20 08:55 36.6 59 20 131/83 (99) 100 Room Air  


 


20 21:00       3.00 


 


20 11:00        21





Capillary Refill : Less Than 3 SecondsLess Than 3 Seconds


I&O











Intake and Output 


 


 20





 00:00


 


Intake Total 1145 ml


 


Output Total 5750 ml


 


Balance -4605 ml


 


 


 


Intake Oral 1045 ml


 


IV Total 100 ml


 


Output Urine Total 5750 ml








General:  Alert


Lungs:  Clear to Auscultation, Normal Air Movement


Heart:  Regular Rate, No Murmurs


Neuro:  Normal Speech, Other (oriented to self only)





Results/Procedures


Lab


Laboratory Tests


20 11:14: Glucometer 102


20 18:00: Glucometer 101


20 00:06: Glucometer 98


20 05:15: 


White Blood Count 13.0H, Red Blood Count 4.01L, Hemoglobin 11.3L, Hematocrit 35L

, Mean Corpuscular Volume 88, Mean Corpuscular Hemoglobin 28, Mean Corpuscular 

Hemoglobin Concent 32, Red Cell Distribution Width 16.8H, Platelet Count 172, 

Mean Platelet Volume 10.8, Sodium Level 141, Potassium Level 3.5L, Chloride 

Level 108H, Carbon Dioxide Level 24, Anion Gap 9, Blood Urea Nitrogen 4L, 

Creatinine 0.76, Estimat Glomerular Filtration Rate > 60, BUN/Creatinine Ratio 

5, Glucose Level 95, Calcium Level 8.6, Corrected Calcium 9.7, Phosphorus Level 

3.3, Magnesium Level 1.9, Total Bilirubin 0.5, Aspartate Amino Transf (AST/SGOT)

14, Alanine Aminotransferase (ALT/SGPT) 7, Alkaline Phosphatase 45, Total 

Protein 5.7L, Albumin 2.6L, Triglycerides Level 67





Microbiology


20 Blood Culture - Preliminary, Resulted


          No growth


20 MRSA Screen - Final, Complete


          MRSA not isolated





Assessment/Plan


Assessment/Plan





(1) Hypertension


Status:  Chronic


Assessment & Plan:  BP elevated 11/10, hydralazine prn while intubated. Monitor 

for need for ativan as BP elevation may also be manifestation of EtOH 

withdrawal.


 remains elevated, but not high enough to require prn hydralazine, monitor


 still elevated on metoprolol, add HCTZ


 improved


Qualifiers:  


   Qualified Codes:  I10 - Essential (primary) hypertension


(2) Hypokalemia


Status:  Acute


Assessment & Plan:  Replace and recheck





(3) Respiratory acidosis


Status:  Resolved


Assessment & Plan:  Initially with low pH and elevated CO2, now with slightly 

elevated pH and low CO2, extubated  am.





(4) Alcohol intoxication


Status:  Resolved


Assessment & Plan:  Monitor for withdrawal symptoms, has had significant 

agitation/aggression with withdrawal in the past. Receiving banana bag daily.


11/10- off sedation, but remains altered, EtOH level negative.


 mental status markedly improved.


Qualifiers:  


   Qualified Codes:  F10.929 - Alcohol use, unspecified with intoxication, 

unspecified


(5) Acute respiratory failure


Status:  Acute


Assessment & Plan:  Plan for extubation today, improving. Appreciate Dr. Wang's recommendations.


11/10- remains intubated due to not following directions or responding off 

sedation.


Qualifiers:  


   Qualified Codes:  J96.02 - Acute respiratory failure with hypercapnia


(6) Acute metabolic encephalopathy


Status:  Acute


Assessment & Plan:  Ammonia elevated, lactulose and rifaximin started.


11/10- remains minimally responsive, continue lactulose, rifaximin. Head CT 

without acute abnormalities.


 extubated and more alert but still not oriented, continue rifaximin, 

lactulose


 oriented to self and location and close on dates, continue lactulose and 

rifaximin, possible d/c tomorrow


 oriented only to self, not functioning adequately, will look into nursing 

home placement possibly next week if not improved over weekend.





(7) DVT prophylaxis


Status:  Acute


Assessment & Plan:  Enoxaparin








Clinical Quality Measures


DVT/VTE Risk/Contraindication:


Risk Factor Score Per Nursin


RFS Level Per Nursing on Admit:  4+=Very High











DUANE CAGE MD             2020 10:37

## 2020-11-14 VITALS — DIASTOLIC BLOOD PRESSURE: 65 MMHG | SYSTOLIC BLOOD PRESSURE: 103 MMHG

## 2020-11-14 VITALS — DIASTOLIC BLOOD PRESSURE: 72 MMHG | SYSTOLIC BLOOD PRESSURE: 110 MMHG

## 2020-11-14 VITALS — SYSTOLIC BLOOD PRESSURE: 106 MMHG | DIASTOLIC BLOOD PRESSURE: 61 MMHG

## 2020-11-14 VITALS — DIASTOLIC BLOOD PRESSURE: 67 MMHG | SYSTOLIC BLOOD PRESSURE: 105 MMHG

## 2020-11-14 VITALS — SYSTOLIC BLOOD PRESSURE: 95 MMHG | DIASTOLIC BLOOD PRESSURE: 57 MMHG

## 2020-11-14 VITALS — DIASTOLIC BLOOD PRESSURE: 59 MMHG | SYSTOLIC BLOOD PRESSURE: 98 MMHG

## 2020-11-14 VITALS — DIASTOLIC BLOOD PRESSURE: 72 MMHG | SYSTOLIC BLOOD PRESSURE: 109 MMHG

## 2020-11-14 LAB
ALBUMIN SERPL-MCNC: 2.5 GM/DL (ref 3.2–4.5)
ALP SERPL-CCNC: 43 U/L (ref 40–136)
ALT SERPL-CCNC: 7 U/L (ref 0–55)
BILIRUB SERPL-MCNC: 0.4 MG/DL (ref 0.1–1)
BUN/CREAT SERPL: 10
CALCIUM SERPL-MCNC: 8.3 MG/DL (ref 8.5–10.1)
CHLORIDE SERPL-SCNC: 105 MMOL/L (ref 98–107)
CO2 SERPL-SCNC: 26 MMOL/L (ref 21–32)
CREAT SERPL-MCNC: 0.77 MG/DL (ref 0.6–1.3)
GFR SERPLBLD BASED ON 1.73 SQ M-ARVRAT: > 60 ML/MIN
GLUCOSE SERPL-MCNC: 86 MG/DL (ref 70–105)
HCT VFR BLD CALC: 33 % (ref 40–54)
HGB BLD-MCNC: 10.6 G/DL (ref 13.3–17.7)
MAGNESIUM SERPL-MCNC: 1.7 MG/DL (ref 1.6–2.4)
MCH RBC QN AUTO: 28 PG (ref 25–34)
MCHC RBC AUTO-ENTMCNC: 32 G/DL (ref 32–36)
MCV RBC AUTO: 88 FL (ref 80–99)
PLATELET # BLD: 184 10^3/UL (ref 130–400)
PMV BLD AUTO: 10.6 FL (ref 9–12.2)
POTASSIUM SERPL-SCNC: 3.9 MMOL/L (ref 3.6–5)
PROT SERPL-MCNC: 5.4 GM/DL (ref 6.4–8.2)
SODIUM SERPL-SCNC: 139 MMOL/L (ref 135–145)
WBC # BLD AUTO: 11.2 10^3/UL (ref 4.3–11)

## 2020-11-14 RX ADMIN — POTASSIUM CHLORIDE SCH MEQ: 1500 TABLET, EXTENDED RELEASE ORAL at 05:12

## 2020-11-14 RX ADMIN — MAGNESIUM SULFATE IN DEXTROSE SCH MLS/HR: 10 INJECTION, SOLUTION INTRAVENOUS at 06:12

## 2020-11-14 RX ADMIN — LACTULOSE SCH GM: 20 SOLUTION ORAL at 20:19

## 2020-11-14 RX ADMIN — METOPROLOL TARTRATE SCH MG: 25 TABLET, FILM COATED ORAL at 08:56

## 2020-11-14 RX ADMIN — RIFAXIMIN SCH MG: 550 TABLET ORAL at 08:56

## 2020-11-14 RX ADMIN — QUETIAPINE FUMARATE SCH MG: 25 TABLET, FILM COATED ORAL at 20:18

## 2020-11-14 RX ADMIN — FAMOTIDINE SCH MG: 20 TABLET, FILM COATED ORAL at 20:18

## 2020-11-14 RX ADMIN — ENOXAPARIN SODIUM SCH MG: 100 INJECTION SUBCUTANEOUS at 06:13

## 2020-11-14 RX ADMIN — LACTULOSE SCH GM: 20 SOLUTION ORAL at 08:52

## 2020-11-14 RX ADMIN — PANTOPRAZOLE SODIUM SCH MG: 40 TABLET, DELAYED RELEASE ORAL at 08:55

## 2020-11-14 RX ADMIN — POTASSIUM CHLORIDE SCH MLS/HR: 200 INJECTION, SOLUTION INTRAVENOUS at 05:11

## 2020-11-14 RX ADMIN — INSULIN ASPART SCH UNIT: 100 INJECTION, SOLUTION INTRAVENOUS; SUBCUTANEOUS at 05:12

## 2020-11-14 RX ADMIN — DEXTROSE MONOHYDRATE AND SODIUM CHLORIDE SCH MLS/HR: 5; .45 INJECTION, SOLUTION INTRAVENOUS at 14:37

## 2020-11-14 RX ADMIN — METOPROLOL TARTRATE SCH MG: 25 TABLET, FILM COATED ORAL at 20:19

## 2020-11-14 RX ADMIN — INSULIN ASPART SCH UNIT: 100 INJECTION, SOLUTION INTRAVENOUS; SUBCUTANEOUS at 00:03

## 2020-11-14 RX ADMIN — HYDROCHLOROTHIAZIDE SCH MG: 25 TABLET ORAL at 08:55

## 2020-11-14 RX ADMIN — MAGNESIUM SULFATE IN DEXTROSE SCH MLS/HR: 10 INJECTION, SOLUTION INTRAVENOUS at 07:41

## 2020-11-14 RX ADMIN — RIFAXIMIN SCH MG: 550 TABLET ORAL at 20:18

## 2020-11-14 RX ADMIN — DIVALPROEX SODIUM SCH MG: 250 TABLET, FILM COATED, EXTENDED RELEASE ORAL at 08:55

## 2020-11-14 RX ADMIN — DEXTROSE MONOHYDRATE AND SODIUM CHLORIDE SCH MLS/HR: 5; .45 INJECTION, SOLUTION INTRAVENOUS at 06:15

## 2020-11-14 RX ADMIN — DEXTROSE MONOHYDRATE AND SODIUM CHLORIDE SCH MLS/HR: 5; .45 INJECTION, SOLUTION INTRAVENOUS at 00:05

## 2020-11-14 RX ADMIN — MAGNESIUM SULFATE IN DEXTROSE SCH MLS/HR: 10 INJECTION, SOLUTION INTRAVENOUS at 05:41

## 2020-11-14 RX ADMIN — ACETAMINOPHEN PRN MG: 325 TABLET ORAL at 18:35

## 2020-11-14 RX ADMIN — DIVALPROEX SODIUM SCH MG: 250 TABLET, FILM COATED, EXTENDED RELEASE ORAL at 20:18

## 2020-11-14 RX ADMIN — DEXTROSE MONOHYDRATE AND SODIUM CHLORIDE SCH MLS/HR: 5; .45 INJECTION, SOLUTION INTRAVENOUS at 21:21

## 2020-11-14 RX ADMIN — QUETIAPINE FUMARATE SCH MG: 25 TABLET, FILM COATED ORAL at 08:55

## 2020-11-14 RX ADMIN — FOLIC ACID SCH MG: 1 TABLET ORAL at 06:14

## 2020-11-14 RX ADMIN — LEVOTHYROXINE SODIUM SCH MCG: 100 TABLET ORAL at 06:14

## 2020-11-14 RX ADMIN — FAMOTIDINE SCH MG: 20 TABLET, FILM COATED ORAL at 08:54

## 2020-11-14 NOTE — PHYSICAL THERAPY DAILY NOTE
PT Daily Note-Current


Subjective


Pt. in bed asleep with sitter present.  He easily awakens and agrees to sit at 

edge of bed.





Mental Status


Attachments:  Hankins Catheter





Transfers


SCALE: Activities may be completed with or without assistive devices.





6-Indepedent-patient completes the activity by him/herself with no assistance 

from a helper.


5-Set-up or Clean-up Assistance-helper sets up or cleans up; patient completes 

activity. [place] assists only prior to or  


    following the activity.


4-Supervision or Touching Assistance-helper provides verbal cues and/or 

touching/steadying and/or contact guard assistance as patient completes 

activity. Assistance may be provided   


    throughout the activity or intermittently.


3-Partial/Moderate Assistance-helper does LESS THAN HALF the effort. [place] 

lifts, holds or supports trunk or limbs, but provides less than half the effort.


2-Substantial/Maximal Assistance-helper does MORE THAN HALF the effort. [place] 

lifts or holds trunk or limbs and provides more than half the effort.


5-Emymljjdq-wrawfw does ALL the effort. Patient does none of the effort to 

complete the activity. Or, the assistance of 2 or more helpers is required for 

the patient to complete the  


    activity.


If activity was not attempted, code reason:


7-Patient Refused.


9-Not Applicable-not attempted and the patient did not perform the activity 

before the current illness, exacerbation or injury.


10-Not Attempted due to Environmental Limitations-(lack of equipment, weather 

restraints, etc.).


88-Not Attempted due to Medical Conditions or Safety Concerns.


Roll Left & Right (QC):  1


Sit to Lying (QC):  1


Lying to Sitting/Side of Bed(Q:  1


Sit to Stand (QC):  1





Weight Bearing


Right Lower Extremity:  Right


Full Weight Bearing


Left Lower Extremity:  Left


Full Weight Bearing





Treatments


transfers





Assessment


Current Status:  Good Progress


Pt. required min-mod A x 2 for transfers to edge of bed today.  Pt. stood at 

bedside for 2-3 minutes with mod-max A x 2 and was able to sidestepping x 3 

steps with max A x 2 to head of bed.  Pt. returned to supine position post 

session with call light and all needs met, sitter present.  Pt. continues to 

require assist x 2 for transfers but gave much greater effort with transfers 

today.





PT Long Term Goals


Long Term Goals


PT Long Term Goals Time Frame:  Nov 18, 2020


Roll Left & Right (QC):  3


Sit to Lying (QC):  3


Lying-Sitting on Side/Bed(QC):  3


Sit to Stand (QC):  3


Chair/Bed-to-Chair Xfer(QC):  3


Walk 10 feet (QC):  3





PT Plan


Treatment/Plan


Treatment Plan:  Continue Plan of Care


Treatment Plan:  Bed Mobility, Education, Functional Activity Rosie, Functional 

Strength, Gait, Safety, Therapeutic Exercise, Transfers


Treatment Duration:  Nov 18, 2020


Frequency:  6 times per week


Estimated Hrs Per Day:  .25 hour per day


Patient and/or Family Agrees t:  Yes





Time/GCodes


Time In:  0953


Time Out:  1007


Total Billed Treatment Time:  14


Total Billed Treatment


1, FA 14'











FAUSTINO FLOWERS PT            Nov 14, 2020 12:51
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
## 2020-11-15 VITALS — DIASTOLIC BLOOD PRESSURE: 71 MMHG | SYSTOLIC BLOOD PRESSURE: 102 MMHG

## 2020-11-15 VITALS — SYSTOLIC BLOOD PRESSURE: 133 MMHG | DIASTOLIC BLOOD PRESSURE: 85 MMHG

## 2020-11-15 VITALS — DIASTOLIC BLOOD PRESSURE: 75 MMHG | SYSTOLIC BLOOD PRESSURE: 144 MMHG

## 2020-11-15 VITALS — SYSTOLIC BLOOD PRESSURE: 88 MMHG | DIASTOLIC BLOOD PRESSURE: 59 MMHG

## 2020-11-15 VITALS — DIASTOLIC BLOOD PRESSURE: 80 MMHG | SYSTOLIC BLOOD PRESSURE: 139 MMHG

## 2020-11-15 VITALS — DIASTOLIC BLOOD PRESSURE: 92 MMHG | SYSTOLIC BLOOD PRESSURE: 137 MMHG

## 2020-11-15 LAB
ALBUMIN SERPL-MCNC: 2.7 GM/DL (ref 3.2–4.5)
ALP SERPL-CCNC: 44 U/L (ref 40–136)
ALT SERPL-CCNC: 7 U/L (ref 0–55)
BILIRUB SERPL-MCNC: 0.3 MG/DL (ref 0.1–1)
BUN/CREAT SERPL: 12
CALCIUM SERPL-MCNC: 8.7 MG/DL (ref 8.5–10.1)
CHLORIDE SERPL-SCNC: 105 MMOL/L (ref 98–107)
CO2 SERPL-SCNC: 26 MMOL/L (ref 21–32)
CREAT SERPL-MCNC: 0.77 MG/DL (ref 0.6–1.3)
GFR SERPLBLD BASED ON 1.73 SQ M-ARVRAT: > 60 ML/MIN
GLUCOSE SERPL-MCNC: 83 MG/DL (ref 70–105)
POTASSIUM SERPL-SCNC: 3.9 MMOL/L (ref 3.6–5)
PROT SERPL-MCNC: 5.9 GM/DL (ref 6.4–8.2)
SODIUM SERPL-SCNC: 141 MMOL/L (ref 135–145)

## 2020-11-15 RX ADMIN — DEXTROSE MONOHYDRATE AND SODIUM CHLORIDE SCH MLS/HR: 5; .45 INJECTION, SOLUTION INTRAVENOUS at 09:40

## 2020-11-15 RX ADMIN — MAGNESIUM SULFATE IN DEXTROSE SCH MLS/HR: 10 INJECTION, SOLUTION INTRAVENOUS at 05:43

## 2020-11-15 RX ADMIN — RIFAXIMIN SCH MG: 550 TABLET ORAL at 08:01

## 2020-11-15 RX ADMIN — POTASSIUM CHLORIDE SCH MLS/HR: 200 INJECTION, SOLUTION INTRAVENOUS at 06:01

## 2020-11-15 RX ADMIN — LORAZEPAM PRN MG: 1 TABLET ORAL at 02:02

## 2020-11-15 RX ADMIN — LORAZEPAM PRN MG: 1 TABLET ORAL at 10:29

## 2020-11-15 RX ADMIN — LACTULOSE SCH GM: 20 SOLUTION ORAL at 08:01

## 2020-11-15 RX ADMIN — FOLIC ACID SCH MG: 1 TABLET ORAL at 06:16

## 2020-11-15 RX ADMIN — METOPROLOL TARTRATE SCH MG: 25 TABLET, FILM COATED ORAL at 20:16

## 2020-11-15 RX ADMIN — PANTOPRAZOLE SODIUM SCH MG: 40 TABLET, DELAYED RELEASE ORAL at 08:01

## 2020-11-15 RX ADMIN — RIFAXIMIN SCH MG: 550 TABLET ORAL at 20:16

## 2020-11-15 RX ADMIN — LEVOTHYROXINE SODIUM SCH MCG: 100 TABLET ORAL at 06:16

## 2020-11-15 RX ADMIN — ENOXAPARIN SODIUM SCH MG: 100 INJECTION SUBCUTANEOUS at 06:16

## 2020-11-15 RX ADMIN — QUETIAPINE FUMARATE SCH MG: 25 TABLET, FILM COATED ORAL at 20:16

## 2020-11-15 RX ADMIN — DIVALPROEX SODIUM SCH MG: 250 TABLET, FILM COATED, EXTENDED RELEASE ORAL at 20:16

## 2020-11-15 RX ADMIN — HYDROCHLOROTHIAZIDE SCH MG: 25 TABLET ORAL at 08:08

## 2020-11-15 RX ADMIN — QUETIAPINE FUMARATE SCH MG: 25 TABLET, FILM COATED ORAL at 08:01

## 2020-11-15 RX ADMIN — DIVALPROEX SODIUM SCH MG: 250 TABLET, FILM COATED, EXTENDED RELEASE ORAL at 08:02

## 2020-11-15 RX ADMIN — DEXTROSE MONOHYDRATE AND SODIUM CHLORIDE SCH MLS/HR: 5; .45 INJECTION, SOLUTION INTRAVENOUS at 04:23

## 2020-11-15 RX ADMIN — METOPROLOL TARTRATE SCH MG: 25 TABLET, FILM COATED ORAL at 08:08

## 2020-11-15 RX ADMIN — POTASSIUM CHLORIDE SCH MEQ: 1500 TABLET, EXTENDED RELEASE ORAL at 06:01

## 2020-11-15 RX ADMIN — ACETAMINOPHEN PRN MG: 325 TABLET ORAL at 08:02

## 2020-11-15 RX ADMIN — FAMOTIDINE SCH MG: 20 TABLET, FILM COATED ORAL at 08:01

## 2020-11-15 RX ADMIN — FAMOTIDINE SCH MG: 20 TABLET, FILM COATED ORAL at 20:16

## 2020-11-15 RX ADMIN — LACTULOSE SCH GM: 20 SOLUTION ORAL at 20:15
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
## 2020-11-15 NOTE — NUR
DR GARLAND NOTIFIED OF PATIENT'S CONFUSION AND AGITATION.  SITTER AT BEDSIDE RELIEVED BY 
ANOTHER SITTER, PATIENT. PATIENT STOOD WITH MINIMAL ASSISTANCE AND IS NOW SITTING IN 
RECLINER CHAIR.  NEW ORDERS RECEIVED.
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
## 2020-11-16 VITALS — DIASTOLIC BLOOD PRESSURE: 75 MMHG | SYSTOLIC BLOOD PRESSURE: 114 MMHG

## 2020-11-16 VITALS — DIASTOLIC BLOOD PRESSURE: 68 MMHG | SYSTOLIC BLOOD PRESSURE: 100 MMHG

## 2020-11-16 VITALS — SYSTOLIC BLOOD PRESSURE: 133 MMHG | DIASTOLIC BLOOD PRESSURE: 85 MMHG

## 2020-11-16 VITALS — SYSTOLIC BLOOD PRESSURE: 107 MMHG | DIASTOLIC BLOOD PRESSURE: 59 MMHG

## 2020-11-16 LAB
ALBUMIN SERPL-MCNC: 2.8 GM/DL (ref 3.2–4.5)
ALP SERPL-CCNC: 44 U/L (ref 40–136)
ALT SERPL-CCNC: 8 U/L (ref 0–55)
BILIRUB SERPL-MCNC: 0.4 MG/DL (ref 0.1–1)
BUN/CREAT SERPL: 13
CALCIUM SERPL-MCNC: 8.9 MG/DL (ref 8.5–10.1)
CHLORIDE SERPL-SCNC: 101 MMOL/L (ref 98–107)
CO2 SERPL-SCNC: 27 MMOL/L (ref 21–32)
CREAT SERPL-MCNC: 0.75 MG/DL (ref 0.6–1.3)
GFR SERPLBLD BASED ON 1.73 SQ M-ARVRAT: > 60 ML/MIN
GLUCOSE SERPL-MCNC: 83 MG/DL (ref 70–105)
MAGNESIUM SERPL-MCNC: 1.7 MG/DL (ref 1.6–2.4)
POTASSIUM SERPL-SCNC: 3.6 MMOL/L (ref 3.6–5)
PROT SERPL-MCNC: 6.1 GM/DL (ref 6.4–8.2)
SODIUM SERPL-SCNC: 140 MMOL/L (ref 135–145)

## 2020-11-16 RX ADMIN — ACETAMINOPHEN PRN MG: 325 TABLET ORAL at 05:17

## 2020-11-16 RX ADMIN — METOPROLOL TARTRATE SCH MG: 25 TABLET, FILM COATED ORAL at 08:44

## 2020-11-16 RX ADMIN — POTASSIUM CHLORIDE SCH MLS/HR: 200 INJECTION, SOLUTION INTRAVENOUS at 05:31

## 2020-11-16 RX ADMIN — MAGNESIUM SULFATE IN DEXTROSE SCH MLS/HR: 10 INJECTION, SOLUTION INTRAVENOUS at 06:27

## 2020-11-16 RX ADMIN — PANTOPRAZOLE SODIUM SCH MG: 40 TABLET, DELAYED RELEASE ORAL at 08:44

## 2020-11-16 RX ADMIN — DIVALPROEX SODIUM SCH MG: 250 TABLET, FILM COATED, EXTENDED RELEASE ORAL at 08:44

## 2020-11-16 RX ADMIN — MAGNESIUM SULFATE IN DEXTROSE SCH MLS/HR: 10 INJECTION, SOLUTION INTRAVENOUS at 05:32

## 2020-11-16 RX ADMIN — LACTULOSE SCH GM: 20 SOLUTION ORAL at 08:44

## 2020-11-16 RX ADMIN — QUETIAPINE FUMARATE SCH MG: 25 TABLET, FILM COATED ORAL at 08:44

## 2020-11-16 RX ADMIN — POTASSIUM CHLORIDE SCH MEQ: 1500 TABLET, EXTENDED RELEASE ORAL at 05:32

## 2020-11-16 RX ADMIN — RIFAXIMIN SCH MG: 550 TABLET ORAL at 08:44

## 2020-11-16 RX ADMIN — MAGNESIUM SULFATE IN DEXTROSE SCH MLS/HR: 10 INJECTION, SOLUTION INTRAVENOUS at 07:31

## 2020-11-16 RX ADMIN — ACETAMINOPHEN PRN MG: 325 TABLET ORAL at 01:04

## 2020-11-16 RX ADMIN — HYDROCHLOROTHIAZIDE SCH MG: 25 TABLET ORAL at 08:44

## 2020-11-16 RX ADMIN — LEVOTHYROXINE SODIUM SCH MCG: 100 TABLET ORAL at 05:17

## 2020-11-16 RX ADMIN — FAMOTIDINE SCH MG: 20 TABLET, FILM COATED ORAL at 08:44

## 2020-11-16 RX ADMIN — ENOXAPARIN SODIUM SCH MG: 100 INJECTION SUBCUTANEOUS at 05:18

## 2020-11-16 RX ADMIN — FOLIC ACID SCH MG: 1 TABLET ORAL at 05:17
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
## 2020-11-16 NOTE — NUR
SPOKE WITH PT'S SISTER, SHAINA, ABOUT PT'S DISCHARGE INSTRUCTIONS AND FOLLOW UP APPOINTMENTS. 
 CAREGIVER VERBALIZES UNDERSTANDING AND STATES THAT SHE WILL BE HERE AT 12:30 TO PICK THE PT 
UP.

## 2020-11-16 NOTE — NUR
THIS RN D/C'D PTS RT TRIPLE LUMEN IJ. THERE WAS TWO STITCHES THAT THIS NURSE WRITER CUT 
FIRST THEN PULLED OUT IJ. PRESSURE DRESSING APPLIED TO AREA. PT HANDLED PROCEDURE WELL. TIP 
OF CATH INTACT.

## 2020-11-16 NOTE — OCCUPATIONAL THER DAILY NOTE
OT Current Status-Daily Note


Subjective


Pt seen in bed. Sitter present. Pt alert, readily agrees to OT tx session. Pt 

very pleasant through session.





ADL-Treatment


Therapy Code Descriptions/Definitions 





Functional Hawkins Measure:


0=Not Assessed/NA        4=Minimal Assistance


1=Total Assistance        5=Supervision or Setup


2=Maximal Assistance  6=Modified Hawkins


3=Moderate Assistance 7=Complete IndependenceSCALE: Activities may be completed 

with or without assistive devices.





6-Indepedent-patient completes the activity by him/herself with no assistance 

from a helper.


5-Set-up or Clean-up Assistance-helper sets up or cleans up; patient completes 

activity. [place] assists only prior to or  


    following the activity.


4-Supervision or Touching Assistance-helper provides verbal cues and/or 

touching/steadying and/or contact guard assistance as patient completes 

activity. Assistance may be provided   


    throughout the activity or intermittently.


3-Partial/Moderate Assistance-helper does LESS THAN HALF the effort. [place] 

lifts, holds or supports trunk or limbs, but provides less than half the effort.


2-Substantial/Maximal Assistance-helper does MORE THAN HALF the effort. [place] 

lifts or holds trunk or limbs and provides more than half the effort.


6-Ihqyedrvu-qdulwm does ALL the effort. Patient does none of the effort to 

complete the activity. Or, the assistance of 2 or more helpers is required for 

the patient to complete the  


    activity.


If activity was not attempted, code reason:


7-Patient Refused.


9-Not Applicable-not attempted and the patient did not perform the activity 

before the current illness, exacerbation or injury.


10-Not Attempted due to Environmental Limitations-(lack of equipment, weather 

restraints, etc.).


88-Not Attempted due to Medical Conditions or Safety Concerns.


Shower/Bathe Self (QC):  3 (CGA EOB. pt completes all areas excluding bottom )


Upper Body Dressing (QC):  3 (min cues for gown donning.)


Lower Body Dressing (QC):  2 (max A brief donning while standing at walker 

level.)


On/Off Footwear:  3 (Mod A- pt able to doff IND, pt's socks donned EOB.)


Toileting Hygiene (QC):  2 (max A bottom hygiene, pt completes veda care in 

stance with fair balance.)





Other Treatment


Pt completes supine to sit with CGA. Pt sits EOB, completes reaching task as 

sponge bath prepped- good static sitting balance. Pt completes sponge bath EOB 

with good balance in sit. Able to reach to feet to doff socks and clean feet. Pt

sit to stand from high bed with CGA (x2) and completes bottom/ veda care in 

stance (pt states sister completes bottom care at home post-BM, pt denies 

completing). Pt sits with control, returns sit to supine with SBA. Brought to 

hospitals with Ax2. All needs met, call light in reach, pt left in room with sitter 

present.





Education


OT Patient Education:  Correct positioning, Progress toward Goal/Update tx plan,

Safety issues, Transfer techniques


Teaching Recipient:  Patient


Teaching Methods:  Demonstration, Discussion


Response to Teaching:  Verbalize Understanding, Return Demonstration





OT Short Term Goals


Short Term Goals


Time Frame:  Nov 18, 2020


Eating:  3


Oral hygiene:  3





OT Long Term Goals


Long Term Goals


Time Frame:  Dec 2, 2020


Eating (QC):  4


Oral Hygiene (QC):  4


Toileting Hygiene (QC):  3


Upper Body Dressing (QC):  4


Additional Goals:  1-Demonstrate ADL Tasks, 2-Verbalize Understanding, 3-Improve

Strength/Rosie


1=Demonstrate adherence to instructed precautions during ADL tasks.


2=Patient will verbalize/demonstrate understanding of assistive 

devices/modifications for ADL.


3=Patient will improve strength/tolerance for activity to enable patient to pe

rform ADL's.





OT Education/Plan


Problem List/Assessment


Assessment:  Decreased Activ Tolerance, Decreased Safety Aware, Decreased UE 

Strength, Dependent Transfers, Impaired Bed Mobility, Impaired Cognition, Impai

red Funct Balance, Impaired I ADL's, Impaired Self-Care Skills





Discharge Recommendations


Plan/Recommendations:  Continue POC


Therapy Discharge Recommendati:  Scheduled Assistance, Home & Family





Treatment Plan/Plan of Care


Treatment,Training & Education:  Yes


Patient would benefit from OT for education, treatment and training to promote 

independence in ADL's, mobility, safety and/or upper extremity function for 

ADL's.


Plan of Care:  ADL Retraining, Functional Mobility, UE Funct Exercise/Act


Treatment Duration:  Dec 2, 2020


Frequency:  5 times per week


Estimated Hrs Per Day:  .25 hour per day


Agreement:  Yes


Rehab Potential:  Guarded





Time/GCodes


Start Time:  10:28


Stop Time:  10:44


Total Time Billed (hr/min):  16


Billed Treatment Time


1, ADL (16)











KEATON BANKS OTR                Nov 16, 2020 10:56

## 2020-11-16 NOTE — PHYSICAL THERAPY DAILY NOTE
PT Daily Note-Current


Subjective


Patient is very alert and agreeable to participate with therapy.  Does require 

redirection to remain on task.





Mental Status


Patient Orientation:  Person, Time


Attachments:  Hankins Catheter





Transfers


SCALE: Activities may be completed with or without assistive devices.





6-Indepedent-patient completes the activity by him/herself with no assistance 

from a helper.


5-Set-up or Clean-up Assistance-helper sets up or cleans up; patient completes 

activity. [place] assists only prior to or  


    following the activity.


4-Supervision or Touching Assistance-helper provides verbal cues and/or 

touching/steadying and/or contact guard assistance as patient completes 

activity. Assistance may be provided   


    throughout the activity or intermittently.


3-Partial/Moderate Assistance-helper does LESS THAN HALF the effort. [place] 

lifts, holds or supports trunk or limbs, but provides less than half the effort.


2-Substantial/Maximal Assistance-helper does MORE THAN HALF the effort. [place] 

lifts or holds trunk or limbs and provides more than half the effort.


4-Zgeggqtim-ndfnne does ALL the effort. Patient does none of the effort to co

mplete the activity. Or, the assistance of 2 or more helpers is required for the

patient to complete the  


    activity.


If activity was not attempted, code reason:


7-Patient Refused.


9-Not Applicable-not attempted and the patient did not perform the activity 

before the current illness, exacerbation or injury.


10-Not Attempted due to Environmental Limitations-(lack of equipment, weather 

restraints, etc.).


88-Not Attempted due to Medical Conditions or Safety Concerns.


Lying to Sitting/Side of Bed(Q:  4


Sit to Stand (QC):  3


Chair/Bed-to-Chair Xfer(QC):  3





Weight Bearing


Right Lower Extremity:  Right


Full Weight Bearing


Left Lower Extremity:  Left


Full Weight Bearing





Gait Training


Does the Patient Walk?:  Yes


Distance:  150'


Walk 10 feet (QC):  3


Walk 50 ft with 2 Turns(QC):  3


Walk 150 ft (QC):  3


Gait Assistive Device:  FWW


safe and functional with no deviation





Exercises


Seated Therapy Exercises:  Ankle pumps, Long arc quads


Seated Reps:  15





Assessment


Patient is up in recliner with needs met.  Patient ambulate with FWW without 

difficulty.  Continues to have diminished safety awareness.





PT Long Term Goals


Long Term Goals


PT Long Term Goals Time Frame:  Nov 18, 2020


Roll Left & Right (QC):  3


Sit to Lying (QC):  3


Lying-Sitting on Side/Bed(QC):  3


Sit to Stand (QC):  3


Chair/Bed-to-Chair Xfer(QC):  3


Walk 10 feet (QC):  3





PT Plan


Treatment/Plan


Treatment Plan:  Continue Plan of Care


Treatment Plan:  Bed Mobility, Education, Functional Activity Rosie, Functional 

Strength, Gait, Safety, Therapeutic Exercise, Transfers


Treatment Duration:  Nov 18, 2020


Frequency:  6 times per week


Estimated Hrs Per Day:  .25 hour per day


Patient and/or Family Agrees t:  Yes





Time/GCodes


Time In:  917


Time Out:  930


Total Billed Treatment Time:  13


Total Billed Treatment


1 visit


FA 13 min











WILLIAM SAAVEDRA PT              Nov 16, 2020 10:29

## 2020-11-16 NOTE — NUR
CM/SS finalized plan. 



Plan: Patient discharged home with his sister Mariely for a few days. Mariely was the 
patient's transportation. 



JUAN RAMON/CHRIS was notified by the patient's nurse that he was bale to walk and was not as confused. 
JUAN RAMON/SS informed Mariely of this. She wanted this sw to cancel the alf nursing home 
referral. CM/SS contacted [facility] and canceled. Mariely reports she is willing to have the 
patient stay in her home for the next couple of days until he can return home alone. 



No further needs.

## 2020-11-16 NOTE — DISCHARGE SUMMARY
Diagnosis/Chief Complaint


Date of Admission


2020 at 15:10


Date of Discharge





Discharge Date:  2020


Discharge Diagnosis


Chronic alcohol use withdrawal


Underlying dementia probably


Confusion and agitation


Hypertension





Discharge Summary


Discharge Physical Examination


Allergies:  


Coded Allergies:  


     No Known Drug Allergies (Unverified , 19)


Vitals & I&Os





Vital Signs








  Date Time  Temp Pulse Resp B/P (MAP) Pulse Ox O2 Delivery O2 Flow Rate FiO2


 


20 11:58 36.6 65 20 107/59 (75) 100   


 


20 10:49      Room Air  


 


20 01:31        21


 


20 21:00       3.00 








General Appearance:  Alert, Cooperative


Respiratory:  Clear to Auscultation


Cardiovascular:  Regular Rate





Hospital Course


Was the Problem List Reviewed?:  Yes


Hospital course: 


Pt had a lengthy hospital course for ten days after he was admitted for acute 

respiratory failure of which he has had this issue before since he was admitted 

in March from Urgent Care by this examiner and ultimately was intubated for 

severe agitation that ultimately ended up being alcohol withdrawal. He was then 

shipped to [place] and then [facility] and then went home with sister and he 

returned again but at this current time he is less confused, he can walk now 

with therapy and ready for discharge with his sister and does not need any home 

care.


Labs (last 24 hrs)


Laboratory Tests


20 11:50: 


White Blood Count 8.6, Red Blood Count 4.68, Hemoglobin 12.9L, Hematocrit 40, 

Mean Corpuscular Volume 85, Mean Corpuscular Hemoglobin 28, Mean Corpuscular 

Hemoglobin Concent 32, Red Cell Distribution Width 16.7H, Platelet Count 239, 

Mean Platelet Volume 10.4, Urine Color YELLOW, Urine Clarity CLEAR, Urine pH 

5.5, Urine Specific Gravity 1.020, Urine Protein NEGATIVE, Urine Glucose (UA) 

NEGATIVE, Urine Ketones NEGATIVE, Urine Nitrite NEGATIVE, Urine Bilirubin 

NEGATIVE, Urine Urobilinogen 0.2, Urine Leukocyte Esterase NEGATIVE, Urine RBC 

(Auto) NEGATIVE, Urine RBC 0-2, Urine WBC NONE, Urine Squamous Epithelial Cells 

10-25H, Urine Crystals PRESENTH, Urine Amorphous Sediment FEW GODFREY URATESH, 

Urine Bacteria NEGATIVE, Urine Casts NONE, Urine Mucus MODERATEH, Urine Culture 

Indicated NO, Sodium Level 143, Potassium Level 3.8, Chloride Level 108H, Carbon

Dioxide Level 21, Anion Gap 14, Blood Urea Nitrogen 5L, Creatinine 0.97, Estimat

Glomerular Filtration Rate > 60, BUN/Creatinine Ratio 5, Glucose Level 90, 

Calcium Level 8.8, Corrected Calcium 9.3, Total Bilirubin 0.2, Aspartate Amino 

Transf (AST/SGOT) 19, Alanine Aminotransferase (ALT/SGPT) 9, Alkaline 

Phosphatase 52, Total Protein 6.8, Albumin 3.4, Urine Opiates Screen NEGATIVE, 

Urine Oxycodone Screen NEGATIVE, Urine Methadone Screen NEGATIVE, Urine 

Propoxyphene Screen NEGATIVE, Urine Barbiturates Screen NEGATIVE, Ur Tricyclic 

Antidepressants Screen NEGATIVE, Urine Phencyclidine Screen NEGATIVE, Urine 

Amphetamines Screen NEGATIVE, Urine Methamphetamines Screen NEGATIVE, Urine 

Benzodiazepines Screen NEGATIVE, Urine Cocaine Screen NEGATIVE, Urine 

Cannabinoids Screen NEGATIVE, Serum Alcohol 363*H


20 13:15: 


Blood Gas Puncture Site L RADIAL, Blood Gas Patient Temperature , Arterial Blood

pH 7.27*L, Arterial Blood Partial Pressure CO2 48H, Arterial Blood Partial 

Pressure O2 89, Arterial Blood HCO3 22L, Arterial Blood Total CO2 23.5, Arterial

Blood Oxygen Saturation 95, Arterial Blood Base Excess -5.0L, Mazin Test NA, 

Blood Gas Ventilator Setting NO, Blood Gas Inspired Oxygen ROOM AIR


20 13:24: Lactic Acid Level 2.67*H


20 16:00: 


Blood Gas Puncture Site R RADIAL, Blood Gas Patient Temperature 35.8, Arterial 

Blood pH 7.30*L, Arterial Blood Partial Pressure CO2 38, Arterial Blood Partial 

Pressure O2 94H, Arterial Blood HCO3 18L, Arterial Blood Total CO2 19.6L, 

Arterial Blood Oxygen Saturation 96, Arterial Blood Base Excess -7.1L, Mazin 

Test UNK, Blood Gas Ventilator Setting YES, Blood Gas Inspired Oxygen UNK


20 16:09: 


White Blood Count 8.0, Red Blood Count 4.48, Hemoglobin 12.3L, Hematocrit 39L, 

Mean Corpuscular Volume 87, Mean Corpuscular Hemoglobin 28, Mean Corpuscular 

Hemoglobin Concent 32, Red Cell Distribution Width 16.6H, Platelet Count 227, 

Mean Platelet Volume 10.5, Immature Granulocyte % (Auto) 0, Neutrophils (%) (Au

to) 58, Lymphocytes (%) (Auto) 33, Monocytes (%) (Auto) 7, Eosinophils (%) 

(Auto) 1, Basophils (%) (Auto) 0, Neutrophils # (Auto) 4.7, Lymphocytes # (Auto)

2.7, Monocytes # (Auto) 0.6, Eosinophils # (Auto) 0.1, Basophils # (Auto) 0.0, 

Immature Granulocyte # (Auto) 0.0, Prothrombin Time 14.7, INR Comment 1.1, 

Sodium Level 145, Potassium Level 3.5L, Chloride Level 113H, Carbon Dioxide 

Level 17L, Anion Gap 15H, Blood Urea Nitrogen 4L, Creatinine 0.80, Estimat 

Glomerular Filtration Rate > 60, BUN/Creatinine Ratio 5, Glucose Level 100, 

Lactic Acid Level 4.38*H, Calcium Level 7.9L, Corrected Calcium 8.5, Total 

Bilirubin 0.3, Aspartate Amino Transf (AST/SGOT) 19, Alanine Aminotransferase 

(ALT/SGPT) 9, Alkaline Phosphatase 48, Ammonia 46H, Total Protein 6.3L, Albumin 

3.2, Triglycerides Level 136, Procalcitonin 0.02


20 16:57: Glucometer 98


20 18:12: Lactic Acid Level 4.91*H


20 20:08: Lactic Acid Level 5.17*H


20 21:39: Glucometer 102


20 22:40: 


Sodium Level 149H, Potassium Level 3.2L, Chloride Level 115H, Carbon Dioxide 

Level 15L, Anion Gap 19H, Blood Urea Nitrogen 5L, Creatinine 0.83, Estimat 

Glomerular Filtration Rate > 60, BUN/Creatinine Ratio 6, Glucose Level 112H, 

Calcium Level 8.1L, Corrected Calcium 8.8, Total Bilirubin 0.2, Aspartate Amino 

Transf (AST/SGOT) 27, Alanine Aminotransferase (ALT/SGPT) 11, Alkaline 

Phosphatase 50, Troponin I < 0.028, Total Protein 6.3L, Albumin 3.1L


20 22:45: Lactic Acid Level 4.71*H


20 23:16: Glucometer 101


20 01:00: Lactic Acid Level 4.14*H


20 03:10: 


Lactic Acid Level 4.02*H, White Blood Count 9.0, Red Blood Count 4.30, 

Hemoglobin 12.0L, Hematocrit 37L, Mean Corpuscular Volume 86, Mean Corpuscular 

Hemoglobin 28, Mean Corpuscular Hemoglobin Concent 33, Red Cell Distribution 

Width 16.6H, Platelet Count 209, Mean Platelet Volume 10.1, Sodium Level 148H, 

Potassium Level 3.1L, Chloride Level 117H, Carbon Dioxide Level 15L, Anion Gap 

16H, Blood Urea Nitrogen 5L, Creatinine 0.78, Estimat Glomerular Filtration Rate

> 60, BUN/Creatinine Ratio 6, Glucose Level 83, Calcium Level 7.8L, Phosphorus 

Level 2.4, Magnesium Level 1.7, Amylase Level 39, Lipase 15


20 03:15: 


Blood Gas Puncture Site RIGHT WRIST, Blood Gas Patient Temperature 36.6, 

Arterial Blood pH 7.36L, Arterial Blood Partial Pressure CO2 28L, Arterial Blood

Partial Pressure O2 113H, Arterial Blood HCO3 16*L, Arterial Blood Total CO2 

16.5L, Arterial Blood Oxygen Saturation 98, Arterial Blood Base Excess -8.8L, 

Mazin Test POSITIVE, Blood Gas Ventilator Setting YES, Blood Gas Inspired Oxygen

21


20 05:17: Glucometer 65L


20 07:32: Lactic Acid Level 3.95*H


20 09:50: Lactic Acid Level 3.28*H


20 10:58: Glucometer 87


20 12:58: Lactic Acid Level 1.44


20 17:31: Glucometer 115H


20 00:17: Glucometer 117H


20 03:30: 


White Blood Count 6.7, Red Blood Count 3.69L, Hemoglobin 10.2L, Hematocrit 32L, 

Mean Corpuscular Volume 86, Mean Corpuscular Hemoglobin 28, Mean Corpuscular 

Hemoglobin Concent 32, Red Cell Distribution Width 17.3H, Platelet Count 164, 

Mean Platelet Volume 11.3, Prothrombin Time 15.2H, INR Comment 1.2, Activated 

Partial Thromboplast Time 38H, Blood Gas Puncture Site RIGHT RADIAL WRIST, Blood

Gas Patient Temperature 36.1, Arterial Blood pH 7.46H, Arterial Blood Partial 

Pressure CO2 30L, Arterial Blood Partial Pressure O2 113H, Arterial Blood HCO3 

21L, Arterial Blood Total CO2 22.4, Arterial Blood Oxygen Saturation 99, 

Arterial Blood Base Excess -2.0, Mazin Test POSITIVE, Blood Gas Ventilator 

Setting YES, Blood Gas Inspired Oxygen 21, Sodium Level 145, Potassium Level 

3.5L, Chloride Level 118H, Carbon Dioxide Level 20L, Anion Gap 7, Blood Urea 

Nitrogen 5L, Creatinine 0.81, Estimat Glomerular Filtration Rate > 60, 

BUN/Creatinine Ratio 6, Glucose Level 100, Calcium Level 7.9L, Phosphorus Level 

2.4, Magnesium Level 2.3, Triglycerides Level 91


20 06:05: Ammonia 35H


20 11:55: Glucometer 100


20 17:15: Glucometer 94


20 23:37: Glucometer 95


11/10/20 02:55: 


Blood Gas Puncture Site ARTLINE, Blood Gas Patient Temperature 36.8, Arterial 

Blood pH 7.45H, Arterial Blood Partial Pressure CO2 29L, Arterial Blood Partial 

Pressure O2 71L, Arterial Blood HCO3 20L, Arterial Blood Total CO2 20.4L, 

Arterial Blood Oxygen Saturation 95, Arterial Blood Base Excess -3.9L, Mazin 

Test ARTLINE, Blood Gas Ventilator Setting YES, Blood Gas Inspired Oxygen 21


11/10/20 03:00: 


White Blood Count 6.8, Red Blood Count 3.90L, Hemoglobin 10.9L, Hematocrit 34L, 

Mean Corpuscular Volume 87, Mean Corpuscular Hemoglobin 28, Mean Corpuscular 

Hemoglobin Concent 32, Red Cell Distribution Width 17.9H, Platelet Count 157, 

Mean Platelet Volume 11.0, Sodium Level 142, Potassium Level 3.6, Chloride Level

116H, Carbon Dioxide Level 19L, Anion Gap 7, Blood Urea Nitrogen 5L, Creatinine 

0.85, Estimat Glomerular Filtration Rate > 60, BUN/Creatinine Ratio 6, Glucose 

Level 122H, Calcium Level 8.3L, Phosphorus Level 3.4, Magnesium Level 2.4


11/10/20 05:20: 


Sodium Level 141, Potassium Level 3.7, Chloride Level 114H, Carbon Dioxide Level

21, Anion Gap 6, Blood Urea Nitrogen 5L, Creatinine 0.84, Estimat Glomerular 

Filtration Rate > 60, BUN/Creatinine Ratio 6, Glucose Level 113H, Calcium Level 

8.3L, Corrected Calcium 9.4, Total Bilirubin 0.6, Aspartate Amino Transf 

(AST/SGOT) 13, Alanine Aminotransferase (ALT/SGPT) 11, Alkaline Phosphatase 43, 

Ammonia 25, Total Protein 5.6L, Albumin 2.6L, Serum Alcohol < 10


11/10/20 11:37: Glucometer 107


11/10/20 17:27: Glucometer 91


11/10/20 23:36: Glucometer 103


20 02:38: 


White Blood Count 7.1, Red Blood Count 3.67L, Hemoglobin 10.2L, Hematocrit 32L, 

Mean Corpuscular Volume 87, Mean Corpuscular Hemoglobin 28, Mean Corpuscular 

Hemoglobin Concent 32, Red Cell Distribution Width 17.2H, Platelet Count 167, 

Mean Platelet Volume 10.6, Sodium Level 142, Potassium Level 3.3L, Chloride 

Level 113H, Carbon Dioxide Level 21, Anion Gap 8, Blood Urea Nitrogen 4L, 

Creatinine 0.75, Estimat Glomerular Filtration Rate > 60, BUN/Creatinine Ratio 

5, Glucose Level 104, Calcium Level 8.1L, Phosphorus Level 3.2, Magnesium Level 

1.9, Triglycerides Level 49


20 02:45: 


Blood Gas Puncture Site LEFT RADIAL, Blood Gas Patient Temperature 37.1, 

Arterial Blood pH 7.46H, Arterial Blood Partial Pressure CO2 31L, Arterial Blood

Partial Pressure O2 100H, Arterial Blood HCO3 22L, Arterial Blood Total CO2 

22.5, Arterial Blood Oxygen Saturation 100, Arterial Blood Base Excess -1.7, 

Mazin Test UNKNOWN, Blood Gas Ventilator Setting YES, Blood Gas Inspired Oxygen 

21


20 11:56: Glucometer 94


20 17:05: Glucometer 74


20 23:34: Glucometer 108


20 04:20: 


White Blood Count 10.0, Red Blood Count 4.18L, Hemoglobin 11.7L, Hematocrit 36L,

Mean Corpuscular Volume 87, Mean Corpuscular Hemoglobin 28, Mean Corpuscular 

Hemoglobin Concent 32, Red Cell Distribution Width 16.5H, Platelet Count 187, 

Mean Platelet Volume 10.6, Sodium Level 142, Potassium Level 3.3L, Chloride 

Level 110H, Carbon Dioxide Level 23, Anion Gap 9, Blood Urea Nitrogen 3L, 

Creatinine 0.75, Estimat Glomerular Filtration Rate > 60, BUN/Creatinine Ratio 

4, Glucose Level 90, Calcium Level 8.5, Phosphorus Level 3.0, Magnesium Level 

1.7


20 11:14: Glucometer 102


20 18:00: Glucometer 101


20 00:06: Glucometer 98


20 05:15: 


White Blood Count 13.0H, Red Blood Count 4.01L, Hemoglobin 11.3L, Hematocrit 35L

, Mean Corpuscular Volume 88, Mean Corpuscular Hemoglobin 28, Mean Corpuscular 

Hemoglobin Concent 32, Red Cell Distribution Width 16.8H, Platelet Count 172, 

Mean Platelet Volume 10.8, Sodium Level 141, Potassium Level 3.5L, Chloride 

Level 108H, Carbon Dioxide Level 24, Anion Gap 9, Blood Urea Nitrogen 4L, 

Creatinine 0.76, Estimat Glomerular Filtration Rate > 60, BUN/Creatinine Ratio 

5, Glucose Level 95, Calcium Level 8.6, Corrected Calcium 9.7, Phosphorus Level 

3.3, Magnesium Level 1.9, Total Bilirubin 0.5, Aspartate Amino Transf (AST/SGOT)

14, Alanine Aminotransferase (ALT/SGPT) 7, Alkaline Phosphatase 45, Total 

Protein 5.7L, Albumin 2.6L, Triglycerides Level 67


20 12:56: Glucometer 147H


20 17:08: Glucometer 87


20 23:55: Glucometer 95


20 04:30: 


White Blood Count 11.2H, Red Blood Count 3.75L, Hemoglobin 10.6L, Hematocrit 33L

, Mean Corpuscular Volume 88, Mean Corpuscular Hemoglobin 28, Mean Corpuscular 

Hemoglobin Concent 32, Red Cell Distribution Width 16.9H, Platelet Count 184, 

Mean Platelet Volume 10.6, Sodium Level 139, Potassium Level 3.9, Chloride Level

105, Carbon Dioxide Level 26, Anion Gap 8, Blood Urea Nitrogen 8, Creatinine 

0.77, Estimat Glomerular Filtration Rate > 60, BUN/Creatinine Ratio 10, Glucose 

Level 86, Calcium Level 8.3L, Corrected Calcium 9.5, Magnesium Level 1.7, Total 

Bilirubin 0.4, Aspartate Amino Transf (AST/SGOT) 12, Alanine Aminotransferase 

(ALT/SGPT) 7, Alkaline Phosphatase 43, Total Protein 5.4L, Albumin 2.5L


11/15/20 04:20: 


Sodium Level 141, Potassium Level 3.9, Chloride Level 105, Carbon Dioxide Level 

26, Anion Gap 10, Blood Urea Nitrogen 9, Creatinine 0.77, Estimat Glomerular 

Filtration Rate > 60, BUN/Creatinine Ratio 12, Glucose Level 83, Calcium Level 

8.7, Corrected Calcium 9.7, Magnesium Level 1.9, Total Bilirubin 0.3, Aspartate 

Amino Transf (AST/SGOT) 15, Alanine Aminotransferase (ALT/SGPT) 7, Alkaline 

Phosphatase 44, Total Protein 5.9L, Albumin 2.7L


20 05:00: 


Sodium Level 140, Potassium Level 3.6, Chloride Level 101, Carbon Dioxide Level 

27, Anion Gap 12, Blood Urea Nitrogen 10, Creatinine 0.75, Estimat Glomerular 

Filtration Rate > 60, BUN/Creatinine Ratio 13, Glucose Level 83, Calcium Level 

8.9, Corrected Calcium 9.9, Magnesium Level 1.7, Total Bilirubin 0.4, Aspartate 

Amino Transf (AST/SGOT) 15, Alanine Aminotransferase (ALT/SGPT) 8, Alkaline 

Phosphatase 44, Total Protein 6.1L, Albumin 2.8L





Microbiology


20 Blood Culture - Final, Complete


          No growth


20 MRSA Screen - Final, Complete


          MRSA not isolated





Pending Labs





Microbiology








 Date/Time


Source Procedure


Growth Status





 


 20 22:50


Peripheral Lt Ac Blood Culture - Final


No growth Complete


 


 20 22:40


Port Central Line Blood Culture - Final


No growth Complete


 


 20 18:12


Peripheral Rt Hand Blood Culture - Final


No growth Complete


 


 20 17:56


Peripheral Lt Ac Blood Culture - Final


No growth Complete


 


 20 15:51


Nasal MRSA Screen - Final


MRSA not isolated Complete





 20 15:40


Sputum Endotracheal Gram Stain - Final Complete


 


 20 15:40 Sputum Culture - Final


Usual upper respiratory tanesha Complete





Laboratory Tests


20 11:50: 


White Blood Count 8.6, Red Blood Count 4.68, Hemoglobin 12.9, Hematocrit 40, 

Mean Corpuscular Volume 85, Mean Corpuscular Hemoglobin 28, Mean Corpuscular 

Hemoglobin Concent 32, Red Cell Distribution Width 16.7, Platelet Count 239, 

Mean Platelet Volume 10.4, Urine Color YELLOW, Urine Clarity CLEAR, Urine pH 

5.5, Urine Specific Gravity 1.020, Urine Protein NEGATIVE, Urine Glucose (UA) 

NEGATIVE, Urine Ketones NEGATIVE, Urine Nitrite NEGATIVE, Urine Bilirubin 

NEGATIVE, Urine Urobilinogen 0.2, Urine Leukocyte Esterase NEGATIVE, Urine RBC 

(Auto) NEGATIVE, Urine RBC 0-2, Urine WBC NONE, Urine Squamous Epithelial Cells 

10-25, Urine Crystals PRESENT, Urine Amorphous Sediment FEW GODFREY URATES, Urine 

Bacteria NEGATIVE, Urine Casts NONE, Urine Mucus MODERATE, Urine Culture 

Indicated NO, Sodium Level 143, Potassium Level 3.8, Chloride Level 108, Carbon 

Dioxide Level 21, Anion Gap 14, Blood Urea Nitrogen 5, Creatinine 0.97, Estimat 

Glomerular Filtration Rate > 60, BUN/Creatinine Ratio 5, Glucose Level 90, 

Calcium Level 8.8, Corrected Calcium 9.3, Total Bilirubin 0.2, Aspartate Amino 

Transf (AST/SGOT) 19, Alanine Aminotransferase (ALT/SGPT) 9, Alkaline 

Phosphatase 52, Total Protein 6.8, Albumin 3.4, Urine Opiates Screen NEGATIVE, 

Urine Oxycodone Screen NEGATIVE, Urine Methadone Screen NEGATIVE, Urine 

Propoxyphene Screen NEGATIVE, Urine Barbiturates Screen NEGATIVE, Ur Tricyclic 

Antidepressants Screen NEGATIVE, Urine Phencyclidine Screen NEGATIVE, Urine 

Amphetamines Screen NEGATIVE, Urine Methamphetamines Screen NEGATIVE, Urine 

Benzodiazepines Screen NEGATIVE, Urine Cocaine Screen NEGATIVE, Urine 

Cannabinoids Screen NEGATIVE, Serum Alcohol 363


20 13:15: 


Blood Gas Puncture Site L RADIAL, Blood Gas Patient Temperature , Arterial Blood

pH 7.27, Arterial Blood Partial Pressure CO2 48, Arterial Blood Partial Pressure

O2 89, Arterial Blood HCO3 22, Arterial Blood Total CO2 23.5, Arterial Blood 

Oxygen Saturation 95, Arterial Blood Base Excess -5.0, Mazin Test NA, Blood Gas 

Ventilator Setting NO, Blood Gas Inspired Oxygen ROOM AIR


20 13:24: Lactic Acid Level 2.67


20 16:00: 


Blood Gas Puncture Site R RADIAL, Blood Gas Patient Temperature 35.8, Arterial 

Blood pH 7.30, Arterial Blood Partial Pressure CO2 38, Arterial Blood Partial 

Pressure O2 94, Arterial Blood HCO3 18, Arterial Blood Total CO2 19.6, Arterial 

Blood Oxygen Saturation 96, Arterial Blood Base Excess -7.1, Maizn Test UNK, 

Blood Gas Ventilator Setting YES, Blood Gas Inspired Oxygen UNK


20 16:09: 


White Blood Count 8.0, Red Blood Count 4.48, Hemoglobin 12.3, Hematocrit 39, 

Mean Corpuscular Volume 87, Mean Corpuscular Hemoglobin 28, Mean Corpuscular 

Hemoglobin Concent 32, Red Cell Distribution Width 16.6, Platelet Count 227, 

Mean Platelet Volume 10.5, Immature Granulocyte % (Auto) 0, Neutrophils (%) 

(Auto) 58, Lymphocytes (%) (Auto) 33, Monocytes (%) (Auto) 7, Eosinophils (%) 

(Auto) 1, Basophils (%) (Auto) 0, Neutrophils # (Auto) 4.7, Lymphocytes # (Auto)

2.7, Monocytes # (Auto) 0.6, Eosinophils # (Auto) 0.1, Basophils # (Auto) 0.0, 

Immature Granulocyte # (Auto) 0.0, Prothrombin Time 14.7, INR Comment 1.1, 

Sodium Level 145, Potassium Level 3.5, Chloride Level 113, Carbon Dioxide Level 

17, Anion Gap 15, Blood Urea Nitrogen 4, Creatinine 0.80, Estimat Glomerular 

Filtration Rate > 60, BUN/Creatinine Ratio 5, Glucose Level 100, Lactic Acid 

Level 4.38, Calcium Level 7.9, Corrected Calcium 8.5, Total Bilirubin 0.3, 

Aspartate Amino Transf (AST/SGOT) 19, Alanine Aminotransferase (ALT/SGPT) 9, 

Alkaline Phosphatase 48, Ammonia 46, Total Protein 6.3, Albumin 3.2, 

Triglycerides Level 136, Procalcitonin 0.02


20 16:57: Glucometer 98


20 18:12: Lactic Acid Level 4.91


20 20:08: Lactic Acid Level 5.17


20 21:39: Glucometer 102


20 22:40: 


Sodium Level 149, Potassium Level 3.2, Chloride Level 115, Carbon Dioxide Level 

15, Anion Gap 19, Blood Urea Nitrogen 5, Creatinine 0.83, Estimat Glomerular 

Filtration Rate > 60, BUN/Creatinine Ratio 6, Glucose Level 112, Calcium Level 

8.1, Corrected Calcium 8.8, Total Bilirubin 0.2, Aspartate Amino Transf 

(AST/SGOT) 27, Alanine Aminotransferase (ALT/SGPT) 11, Alkaline Phosphatase 50, 

Troponin I < 0.028, Total Protein 6.3, Albumin 3.1


20 22:45: Lactic Acid Level 4.71


20 23:16: Glucometer 101


20 01:00: Lactic Acid Level 4.14


20 03:10: 


Lactic Acid Level 4.02, White Blood Count 9.0, Red Blood Count 4.30, Hemoglobin 

12.0, Hematocrit 37, Mean Corpuscular Volume 86, Mean Corpuscular Hemoglobin 28,

Mean Corpuscular Hemoglobin Concent 33, Red Cell Distribution Width 16.6, 

Platelet Count 209, Mean Platelet Volume 10.1, Sodium Level 148, Potassium Level

3.1, Chloride Level 117, Carbon Dioxide Level 15, Anion Gap 16, Blood Urea 

Nitrogen 5, Creatinine 0.78, Estimat Glomerular Filtration Rate > 60, 

BUN/Creatinine Ratio 6, Glucose Level 83, Calcium Level 7.8, Phosphorus Level 

2.4, Magnesium Level 1.7, Amylase Level 39, Lipase 15


20 03:15: 


Blood Gas Puncture Site RIGHT WRIST, Blood Gas Patient Temperature 36.6, 

Arterial Blood pH 7.36, Arterial Blood Partial Pressure CO2 28, Arterial Blood 

Partial Pressure O2 113, Arterial Blood HCO3 16, Arterial Blood Total CO2 16.5, 

Arterial Blood Oxygen Saturation 98, Arterial Blood Base Excess -8.8, Mazin Test

POSITIVE, Blood Gas Ventilator Setting YES, Blood Gas Inspired Oxygen 21


20 05:17: Glucometer 65


20 07:32: Lactic Acid Level 3.95


20 09:50: Lactic Acid Level 3.28


20 10:58: Glucometer 87


20 12:58: Lactic Acid Level 1.44


20 17:31: Glucometer 115


20 00:17: Glucometer 117


20 03:30: 


White Blood Count 6.7, Red Blood Count 3.69, Hemoglobin 10.2, Hematocrit 32, 

Mean Corpuscular Volume 86, Mean Corpuscular Hemoglobin 28, Mean Corpuscular 

Hemoglobin Concent 32, Red Cell Distribution Width 17.3, Platelet Count 164, 

Mean Platelet Volume 11.3, Prothrombin Time 15.2, INR Comment 1.2, Activated 

Partial Thromboplast Time 38, Blood Gas Puncture Site RIGHT RADIAL WRIST, Blood 

Gas Patient Temperature 36.1, Arterial Blood pH 7.46, Arterial Blood Partial 

Pressure CO2 30, Arterial Blood Partial Pressure O2 113, Arterial Blood HCO3 21,

Arterial Blood Total CO2 22.4, Arterial Blood Oxygen Saturation 99, Arterial 

Blood Base Excess -2.0, Mazin Test POSITIVE, Blood Gas Ventilator Setting YES, 

Blood Gas Inspired Oxygen 21, Sodium Level 145, Potassium Level 3.5, Chloride 

Level 118, Carbon Dioxide Level 20, Anion Gap 7, Blood Urea Nitrogen 5, 

Creatinine 0.81, Estimat Glomerular Filtration Rate > 60, BUN/Creatinine Ratio 

6, Glucose Level 100, Calcium Level 7.9, Phosphorus Level 2.4, Magnesium Level 

2.3, Triglycerides Level 91


20 06:05: Ammonia 35


20 11:55: Glucometer 100


20 17:15: Glucometer 94


20 23:37: Glucometer 95


11/10/20 02:55: 


Blood Gas Puncture Site ARTLINE, Blood Gas Patient Temperature 36.8, Arterial 

Blood pH 7.45, Arterial Blood Partial Pressure CO2 29, Arterial Blood Partial P

ressure O2 71, Arterial Blood HCO3 20, Arterial Blood Total CO2 20.4, Arterial 

Blood Oxygen Saturation 95, Arterial Blood Base Excess -3.9, Mazin Test ARTLINE,

Blood Gas Ventilator Setting YES, Blood Gas Inspired Oxygen 21


11/10/20 03:00: 


White Blood Count 6.8, Red Blood Count 3.90, Hemoglobin 10.9, Hematocrit 34, 

Mean Corpuscular Volume 87, Mean Corpuscular Hemoglobin 28, Mean Corpuscular 

Hemoglobin Concent 32, Red Cell Distribution Width 17.9, Platelet Count 157, 

Mean Platelet Volume 11.0, Sodium Level 142, Potassium Level 3.6, Chloride Level

116, Carbon Dioxide Level 19, Anion Gap 7, Blood Urea Nitrogen 5, Creatinine 

0.85, Estimat Glomerular Filtration Rate > 60, BUN/Creatinine Ratio 6, Glucose 

Level 122, Calcium Level 8.3, Phosphorus Level 3.4, Magnesium Level 2.4


11/10/20 05:20: 


Sodium Level 141, Potassium Level 3.7, Chloride Level 114, Carbon Dioxide Level 

21, Anion Gap 6, Blood Urea Nitrogen 5, Creatinine 0.84, Estimat Glomerular 

Filtration Rate > 60, BUN/Creatinine Ratio 6, Glucose Level 113, Calcium Level 

8.3, Corrected Calcium 9.4, Total Bilirubin 0.6, Aspartate Amino Transf (AST/S

GOT) 13, Alanine Aminotransferase (ALT/SGPT) 11, Alkaline Phosphatase 43, 

Ammonia 25, Total Protein 5.6, Albumin 2.6, Serum Alcohol < 10


11/10/20 11:37: Glucometer 107


11/10/20 17:27: Glucometer 91


11/10/20 23:36: Glucometer 103


20 02:38: 


White Blood Count 7.1, Red Blood Count 3.67, Hemoglobin 10.2, Hematocrit 32, 

Mean Corpuscular Volume 87, Mean Corpuscular Hemoglobin 28, Mean Corpuscular 

Hemoglobin Concent 32, Red Cell Distribution Width 17.2, Platelet Count 167, 

Mean Platelet Volume 10.6, Sodium Level 142, Potassium Level 3.3, Chloride Level

113, Carbon Dioxide Level 21, Anion Gap 8, Blood Urea Nitrogen 4, Creatinine 

0.75, Estimat Glomerular Filtration Rate > 60, BUN/Creatinine Ratio 5, Glucose 

Level 104, Calcium Level 8.1, Phosphorus Level 3.2, Magnesium Level 1.9, 

Triglycerides Level 49


20 02:45: 


Blood Gas Puncture Site LEFT RADIAL, Blood Gas Patient Temperature 37.1, 

Arterial Blood pH 7.46, Arterial Blood Partial Pressure CO2 31, Arterial Blood 

Partial Pressure O2 100, Arterial Blood HCO3 22, Arterial Blood Total CO2 22.5, 

Arterial Blood Oxygen Saturation 100, Arterial Blood Base Excess -1.7, Mazin 

Test UNKNOWN, Blood Gas Ventilator Setting YES, Blood Gas Inspired Oxygen 21


20 11:56: Glucometer 94


20 17:05: Glucometer 74


20 23:34: Glucometer 108


20 04:20: 


White Blood Count 10.0, Red Blood Count 4.18, Hemoglobin 11.7, Hematocrit 36, 

Mean Corpuscular Volume 87, Mean Corpuscular Hemoglobin 28, Mean Corpuscular 

Hemoglobin Concent 32, Red Cell Distribution Width 16.5, Platelet Count 187, 

Mean Platelet Volume 10.6, Sodium Level 142, Potassium Level 3.3, Chloride Level

110, Carbon Dioxide Level 23, Anion Gap 9, Blood Urea Nitrogen 3, Creatinine 

0.75, Estimat Glomerular Filtration Rate > 60, BUN/Creatinine Ratio 4, Glucose 

Level 90, Calcium Level 8.5, Phosphorus Level 3.0, Magnesium Level 1.7


20 11:14: Glucometer 102


20 18:00: Glucometer 101


20 00:06: Glucometer 98


20 05:15: 


White Blood Count 13.0, Red Blood Count 4.01, Hemoglobin 11.3, Hematocrit 35, 

Mean Corpuscular Volume 88, Mean Corpuscular Hemoglobin 28, Mean Corpuscular 

Hemoglobin Concent 32, Red Cell Distribution Width 16.8, Platelet Count 172, 

Mean Platelet Volume 10.8, Sodium Level 141, Potassium Level 3.5, Chloride Level

108, Carbon Dioxide Level 24, Anion Gap 9, Blood Urea Nitrogen 4, Creatinine 

0.76, Estimat Glomerular Filtration Rate > 60, BUN/Creatinine Ratio 5, Glucose 

Level 95, Calcium Level 8.6, Corrected Calcium 9.7, Phosphorus Level 3.3, 

Magnesium Level 1.9, Total Bilirubin 0.5, Aspartate Amino Transf (AST/SGOT) 14, 

Alanine Aminotransferase (ALT/SGPT) 7, Alkaline Phosphatase 45, Total Protein 

5.7, Albumin 2.6, Triglycerides Level 67


20 12:56: Glucometer 147


20 17:08: Glucometer 87


20 23:55: Glucometer 95


20 04:30: 


White Blood Count 11.2, Red Blood Count 3.75, Hemoglobin 10.6, Hematocrit 33, 

Mean Corpuscular Volume 88, Mean Corpuscular Hemoglobin 28, Mean Corpuscular 

Hemoglobin Concent 32, Red Cell Distribution Width 16.9, Platelet Count 184, 

Mean Platelet Volume 10.6, Sodium Level 139, Potassium Level 3.9, Chloride Level

105, Carbon Dioxide Level 26, Anion Gap 8, Blood Urea Nitrogen 8, Creatinine 

0.77, Estimat Glomerular Filtration Rate > 60, BUN/Creatinine Ratio 10, Glucose 

Level 86, Calcium Level 8.3, Corrected Calcium 9.5, Magnesium Level 1.7, Total 

Bilirubin 0.4, Aspartate Amino Transf (AST/SGOT) 12, Alanine Aminotransferase 

(ALT/SGPT) 7, Alkaline Phosphatase 43, Total Protein 5.4, Albumin 2.5


11/15/20 04:20: 


Sodium Level 141, Potassium Level 3.9, Chloride Level 105, Carbon Dioxide Level 

26, Anion Gap 10, Blood Urea Nitrogen 9, Creatinine 0.77, Estimat Glomerular 

Filtration Rate > 60, BUN/Creatinine Ratio 12, Glucose Level 83, Calcium Level 

8.7, Corrected Calcium 9.7, Magnesium Level 1.9, Total Bilirubin 0.3, Aspartate 

Amino Transf (AST/SGOT) 15, Alanine Aminotransferase (ALT/SGPT) 7, Alkaline 

Phosphatase 44, Total Protein 5.9, Albumin 2.7


20 05:00: 


Sodium Level 140, Potassium Level 3.6, Chloride Level 101, Carbon Dioxide Level 

27, Anion Gap 12, Blood Urea Nitrogen 10, Creatinine 0.75, Estimat Glomerular 

Filtration Rate > 60, BUN/Creatinine Ratio 13, Glucose Level 83, Calcium Level 

8.9, Corrected Calcium 9.9, Magnesium Level 1.7, Total Bilirubin 0.4, Aspartate 

Amino Transf (AST/SGOT) 15, Alanine Aminotransferase (ALT/SGPT) 8, Alkaline 

Phosphatase 44, Total Protein 6.1, Albumin 2.8








Discharge


Home Medications:





Active Scripts


Active


Famotidine 20 Mg Tablet 20 Mg PO BID


Hydrochlorothiazide 25 Mg Tablet 25 Mg PO DAILY


Lactulose 20 Gm/30 Ml Solution 10 Gm PO BID


Quetiapine Fumarate 25 Mg Tablet 100 Mg PO BID


Metoprolol Tartrate 25 Mg Tablet 25 Mg PO BID


Xifaxan (Rifaximin) 550 Mg Tablet 550 Mg PO BID


Reported


Euthyrox (Levothyroxine Sodium) 100 Mcg Tablet 100 Mcg PO DAILY


Divalproex Sodium ER (Divalproex Sodium) 250 Mg Tab.er.24h 750 Mg PO BID


     TAKES 3 (250MG) TABS


[Folic Acid] 1 Tab 1 Mg PO DAILY


Pantoprazole Sodium 40 Mg Tablet.dr 40 Mg PO DAILY





Instructions to patient/family


Please see electronic discharge instructions given to patient.





Clinical Quality Measures


DVT/VTE Risk/Contraindication:


Risk Factor Score Per Nursin


RFS Level Per Nursing on Admit:  4+=Very High











ABISAI CORTES DO                2020 10:28

## 2021-02-12 ENCOUNTER — HOSPITAL ENCOUNTER (INPATIENT)
Dept: HOSPITAL 75 - ER FS | Age: 66
LOS: 5 days | Discharge: NURSING, REHAB OR LONG-TERM CARE | DRG: 71 | End: 2021-02-17
Attending: FAMILY MEDICINE | Admitting: INTERNAL MEDICINE
Payer: MEDICARE

## 2021-02-12 VITALS — WEIGHT: 214.29 LBS | HEIGHT: 73.03 IN | BODY MASS INDEX: 28.4 KG/M2

## 2021-02-12 VITALS — SYSTOLIC BLOOD PRESSURE: 96 MMHG | DIASTOLIC BLOOD PRESSURE: 60 MMHG

## 2021-02-12 VITALS — DIASTOLIC BLOOD PRESSURE: 64 MMHG | SYSTOLIC BLOOD PRESSURE: 96 MMHG

## 2021-02-12 VITALS — SYSTOLIC BLOOD PRESSURE: 101 MMHG | DIASTOLIC BLOOD PRESSURE: 72 MMHG

## 2021-02-12 VITALS — DIASTOLIC BLOOD PRESSURE: 69 MMHG | SYSTOLIC BLOOD PRESSURE: 110 MMHG

## 2021-02-12 DIAGNOSIS — R00.1: ICD-10-CM

## 2021-02-12 DIAGNOSIS — Z87.01: ICD-10-CM

## 2021-02-12 DIAGNOSIS — E03.9: ICD-10-CM

## 2021-02-12 DIAGNOSIS — I10: ICD-10-CM

## 2021-02-12 DIAGNOSIS — M54.9: ICD-10-CM

## 2021-02-12 DIAGNOSIS — M19.91: ICD-10-CM

## 2021-02-12 DIAGNOSIS — F41.9: ICD-10-CM

## 2021-02-12 DIAGNOSIS — G93.41: Primary | ICD-10-CM

## 2021-02-12 DIAGNOSIS — H05.20: ICD-10-CM

## 2021-02-12 DIAGNOSIS — F10.239: ICD-10-CM

## 2021-02-12 DIAGNOSIS — Z20.822: ICD-10-CM

## 2021-02-12 LAB
ALBUMIN SERPL-MCNC: 3.3 GM/DL (ref 3.2–4.5)
ALP SERPL-CCNC: 39 U/L (ref 40–136)
ALT SERPL-CCNC: 5 U/L (ref 0–55)
APTT BLD: 28 SEC (ref 24–35)
APTT PPP: YELLOW S
BACTERIA #/AREA URNS HPF: (no result) /HPF
BARBITURATES UR QL: NEGATIVE
BASOPHILS # BLD AUTO: 0 10^3/UL (ref 0–0.1)
BASOPHILS NFR BLD AUTO: 0 % (ref 0–10)
BENZODIAZ UR QL SCN: NEGATIVE
BILIRUB SERPL-MCNC: 0.5 MG/DL (ref 0.1–1)
BILIRUB UR QL STRIP: (no result)
BUN/CREAT SERPL: 9
CALCIUM SERPL-MCNC: 9.3 MG/DL (ref 8.5–10.1)
CHLORIDE SERPL-SCNC: 101 MMOL/L (ref 98–107)
CO2 SERPL-SCNC: 30 MMOL/L (ref 21–32)
COCAINE UR QL: NEGATIVE
CREAT SERPL-MCNC: 1.38 MG/DL (ref 0.6–1.3)
EOSINOPHIL # BLD AUTO: 0.2 10^3/UL (ref 0–0.3)
EOSINOPHIL NFR BLD AUTO: 3 % (ref 0–10)
FIBRINOGEN PPP-MCNC: (no result) MG/DL
GFR SERPLBLD BASED ON 1.73 SQ M-ARVRAT: > 60 ML/MIN
GLUCOSE SERPL-MCNC: 95 MG/DL (ref 70–105)
GLUCOSE UR STRIP-MCNC: NEGATIVE MG/DL
HCT VFR BLD CALC: 38 % (ref 40–54)
HGB BLD-MCNC: 12.6 G/DL (ref 13.3–17.7)
HYALINE CASTS #/AREA URNS LPF: (no result) /LPF
INR PPP: 1 (ref 0.8–1.4)
KETONES UR QL STRIP: (no result)
LEUKOCYTE ESTERASE UR QL STRIP: NEGATIVE
LYMPHOCYTES # BLD AUTO: 1.9 X 10^3 (ref 1–4)
LYMPHOCYTES NFR BLD AUTO: 27 % (ref 12–44)
MANUAL DIFFERENTIAL PERFORMED BLD QL: NO
MCH RBC QN AUTO: 29 PG (ref 25–34)
MCHC RBC AUTO-ENTMCNC: 33 G/DL (ref 32–36)
MCV RBC AUTO: 87 FL (ref 80–99)
METHADONE UR QL SCN: NEGATIVE
METHAMPHETAMINE SCREEN URINE S: NEGATIVE
MONOCYTES # BLD AUTO: 0.9 X 10^3 (ref 0–1)
MONOCYTES NFR BLD AUTO: 13 % (ref 0–12)
NEUTROPHILS # BLD AUTO: 3.9 X 10^3 (ref 1.8–7.8)
NEUTROPHILS NFR BLD AUTO: 57 % (ref 42–75)
NITRITE UR QL STRIP: NEGATIVE
OPIATES UR QL SCN: NEGATIVE
OXYCODONE UR QL: NEGATIVE
PH UR STRIP: 5.5 [PH] (ref 5–9)
PLATELET # BLD: 145 10^3/UL (ref 130–400)
PMV BLD AUTO: 11.5 FL (ref 7.4–10.4)
POTASSIUM SERPL-SCNC: 3.5 MMOL/L (ref 3.6–5)
PROPOXYPH UR QL: NEGATIVE
PROT SERPL-MCNC: 6.6 GM/DL (ref 6.4–8.2)
PROT UR QL STRIP: (no result)
PROTHROMBIN TIME: 13.3 SEC (ref 12.2–14.7)
RBC #/AREA URNS HPF: (no result) /HPF
SODIUM SERPL-SCNC: 140 MMOL/L (ref 135–145)
SP GR UR STRIP: >=1.03 (ref 1.02–1.02)
SQUAMOUS #/AREA URNS HPF: (no result) /HPF
TRICYCLICS UR QL SCN: POSITIVE
WBC # BLD AUTO: 6.9 10^3/UL (ref 4.3–11)

## 2021-02-12 PROCEDURE — 80053 COMPREHEN METABOLIC PANEL: CPT

## 2021-02-12 PROCEDURE — 80320 DRUG SCREEN QUANTALCOHOLS: CPT

## 2021-02-12 PROCEDURE — 36415 COLL VENOUS BLD VENIPUNCTURE: CPT

## 2021-02-12 PROCEDURE — 51702 INSERT TEMP BLADDER CATH: CPT

## 2021-02-12 PROCEDURE — 85025 COMPLETE CBC W/AUTO DIFF WBC: CPT

## 2021-02-12 PROCEDURE — 96361 HYDRATE IV INFUSION ADD-ON: CPT

## 2021-02-12 PROCEDURE — 87088 URINE BACTERIA CULTURE: CPT

## 2021-02-12 PROCEDURE — 85610 PROTHROMBIN TIME: CPT

## 2021-02-12 PROCEDURE — 85730 THROMBOPLASTIN TIME PARTIAL: CPT

## 2021-02-12 PROCEDURE — 82140 ASSAY OF AMMONIA: CPT

## 2021-02-12 PROCEDURE — 70450 CT HEAD/BRAIN W/O DYE: CPT

## 2021-02-12 PROCEDURE — 87635 SARS-COV-2 COVID-19 AMP PRB: CPT

## 2021-02-12 PROCEDURE — 80048 BASIC METABOLIC PNL TOTAL CA: CPT

## 2021-02-12 PROCEDURE — 96374 THER/PROPH/DIAG INJ IV PUSH: CPT

## 2021-02-12 PROCEDURE — 96375 TX/PRO/DX INJ NEW DRUG ADDON: CPT

## 2021-02-12 PROCEDURE — 84443 ASSAY THYROID STIM HORMONE: CPT

## 2021-02-12 PROCEDURE — 83605 ASSAY OF LACTIC ACID: CPT

## 2021-02-12 PROCEDURE — 87040 BLOOD CULTURE FOR BACTERIA: CPT

## 2021-02-12 PROCEDURE — 84100 ASSAY OF PHOSPHORUS: CPT

## 2021-02-12 PROCEDURE — 71045 X-RAY EXAM CHEST 1 VIEW: CPT

## 2021-02-12 PROCEDURE — 93306 TTE W/DOPPLER COMPLETE: CPT

## 2021-02-12 PROCEDURE — 83735 ASSAY OF MAGNESIUM: CPT

## 2021-02-12 PROCEDURE — 80306 DRUG TEST PRSMV INSTRMNT: CPT

## 2021-02-12 PROCEDURE — 81000 URINALYSIS NONAUTO W/SCOPE: CPT

## 2021-02-12 RX ADMIN — POTASSIUM CHLORIDE SCH MLS/HR: 200 INJECTION, SOLUTION INTRAVENOUS at 22:34

## 2021-02-12 RX ADMIN — SODIUM CHLORIDE SCH MLS/HR: 900 INJECTION, SOLUTION INTRAVENOUS at 17:00

## 2021-02-12 RX ADMIN — SODIUM CHLORIDE SCH MLS/HR: 900 INJECTION, SOLUTION INTRAVENOUS at 20:40

## 2021-02-12 RX ADMIN — POTASSIUM CHLORIDE SCH MLS/HR: 200 INJECTION, SOLUTION INTRAVENOUS at 21:42

## 2021-02-12 RX ADMIN — DEXTROSE MONOHYDRATE AND SODIUM CHLORIDE SCH MLS/HR: 5; .45 INJECTION, SOLUTION INTRAVENOUS at 20:41

## 2021-02-12 NOTE — DIAGNOSTIC IMAGING REPORT
INDICATION: Lethargy. 



EXAMINATION: Single view of the chest was obtained. 



COMPARISON: 11/11/2020. 



FINDINGS: The lungs are clear. No failure, effusion or

pneumothorax. 



IMPRESSION: Negative. 



Dictated by: 



  Dictated on workstation # [id number]

## 2021-02-12 NOTE — DIAGNOSTIC IMAGING REPORT
PROCEDURE: CT head without contrast.



TECHNIQUE: Multiple contiguous axial images were obtained through

the brain without the use of intravenous contrast. Auto Exposure

Controls were utilized during the CT exam to meet ALARA standards

for radiation dose reduction. 



INDICATION:  Altered mental status. Lethargy.



COMPARISON: 11/07/2020



FINDINGS: There are scattered and confluent areas of decreased

attenuation within the periventricular and subcortical deep white

matter consistent with chronic small vessel ischemic changes.

Moderate-sized encephalomalacia of the right temporal lobe and

small area of encephalomalacia of the posterior left cerebral

hemisphere are noted and are consistent with old infarcts. There

is no new large area of loss of gray-white matter junction

differentiation to suggest new or acute territorial infarct.



There is no new mass effect or midline shift. Ventricles and

cortical sulci remain diffusely prominent consistent with

age-related parenchymal volume loss. No intra- or extra-axial

intracranial hemorrhage is seen. No other extra-axial masses or

fluid collections are identified.



Bony calvarium is intact. Paranasal sinuses and mastoid air cells

are clear.



IMPRESSION:

1. No new acute intracranial abnormality. No CT evidence acute

infarct, mass, nor hemorrhage.

2. Background chronic small vessel ischemic changes and old

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

hemisphere.



Dictated by: 



  Dictated on workstation # [id number]

## 2021-02-12 NOTE — ED GENERAL
General


Chief Complaint:  Altered Mental Status


Stated Complaint:  AMS


Nursing Triage Note:  


Patient brought to the ED by EMS per family request. Family reports that patient




has been found on the floor of his home for the last two mornings and has not 


wanted to get up and walk. Patient lethargic on arrival to ED, answers questions




appropriately and follows simple commands.


Nursing Sepsis Screen:  No Definite Risk


Source of Information:  Patient





History of Present Illness


Date Seen by Provider:  Feb 12, 2021


Time Seen by Provider:  13:50


Initial Comments


Patient is a 65-year-old -American male who presents with decreased 

mental status.  According to family member's, the patient has been found 

unresponsive for week twice during the morning for the past 2 days and then 

again this afternoon.  Patient is bradycardic, obtunded and responds only to 

tactile stimulation ED.  His blood pressure is mildly hypertensive with a 

systolic of 100.  Patient's blood sugar was checked prior to ED arrival.  

Patient has a history of psychosis and hypothyroidism.  It is unclear if the 

patient is compliant or supratherapeutic with his medications.  History is 

limited by presentation


Timing/Duration:  3-4 Days


Severity:  Moderate


Associated Systoms:  Other





Allergies and Home Medications


Allergies


Coded Allergies:  


     No Known Drug Allergies (Unverified , 6/16/19)





Home Medications


Divalproex Sodium 250 Mg Tab.er.24h, 750 MG PO BID, (Reported)


   TAKES 3 (250MG) TABS 


Famotidine 20 Mg Tablet, 20 MG PO BID


   Prescribed by: ABISAI CORTES on 11/16/20 1027


Hydrochlorothiazide 25 Mg Tablet, 25 MG PO DAILY


   Prescribed by: ABISAI CORTES on 11/16/20 1027


Lactulose 20 Gm/30 Ml Solution, 10 GM PO BID


   Prescribed by: ABISAI CORTES on 11/16/20 1027


Levothyroxine Sodium 100 Mcg Tablet, 100 MCG PO DAILY, (Reported)


Metoprolol Tartrate 25 Mg Tablet, 25 MG PO BID


   Prescribed by: ABISAI CORTES on 11/16/20 1027


Pantoprazole Sodium 40 Mg Tablet.dr, 40 MG PO DAILY, (Reported)


Quetiapine Fumarate 25 Mg Tablet, 100 MG PO BID


   Prescribed by: ABISAI CORTES on 11/16/20 1027


Rifaximin 550 Mg Tablet, 550 MG PO BID


   Prescribed by: ABISAI CORTES on 11/16/20 1027


[Folic Acid] 1 TAB, 1 MG PO DAILY, (Reported)





Patient Home Medication List


Home Medication List Reviewed:  Yes





Review of Systems


Review of Systems


Constitutional:  see HPI


EENTM:  see HPI


Respiratory:  see HPI


Cardiovascular:  see HPI


Gastrointestinal:  see HPI


Genitourinary:  see HPI


Musculoskeletal:  see HPI


Skin:  see HPI


Psychiatric/Neurological:  See HPI


Hematologic/Lymphatic:  See HPI


Immunological/Allergic:  see HPI





All Other Systems Reviewed


Negative Unless Noted:  Yes





Past Medical-Social-Family Hx


Past Med/Social Hx:  Reviewed Nursing Past Med/Soc Hx


Patient Social History


Alcohol Beverage of Choice:  Beer, Whiskey


Type Used:  Smokeless Tobacco


2nd Hand Smoke Exposure:  No


Recent Infectious Disease Expo:  No


Recent Hopitalizations:  No





Seasonal Allergies


Seasonal Allergies:  No





Past Medical History


Surgeries:  Yes


Gallbladder


Respiratory:  No


Pneumonia


Currently Using CPAP:  No


Currently Using BIPAP:  No


Cardiac:  Yes


Hypertension


Neurological:  No


Genitourinary:  No


Gastrointestinal:  No


Musculoskeletal:  Yes


Arthritis, Chronic Back Pain


Endocrine:  No


HEENT:  No


Loss of Vision:  Denies


Hearing Impairment:  Denies


Cancer:  No


Psychosocial:  Yes


Anxiety


Integumentary:  No


Blood Disorders:  No


Adverse Reaction/Blood Tranf:  No





Family Medical History


Diabetes, GI Disease, Other Conditions/Hx





Physical Exam


Vital Signs





Vital Signs - First Documented








 2/12/21





 15:42


 


Temp 36.1


 


Pulse 53


 


Resp 16


 


B/P (MAP) 111/71 (84)


 


Pulse Ox 98


 


O2 Delivery Room Air





Capillary Refill : Less Than 3 Seconds


Height, Weight, BMI


Height: 5'7.50"


Weight: 365lbs. 0oz. [id number]; 32.00 BMI


Method:Estimated


General Appearance:  Other (Alerts to tactile stimulation)


Eyes:  Bilateral Eye Normal Inspection, Bilateral Eye PERRL, Bilateral Eye EOMI


HEENT:  PERRL/EOMI, Normal ENT Inspection, Pharynx Normal, Other (exophtalmos. )


Neck:  Full Range of Motion, Supple


Respiratory:  Lungs Clear ( Dry mucous membrane)


Cardiovascular:  Bradycardia


Gastrointestinal:  Non Tender, Soft





Focused Exam


Sepsis Stage:  Ruled Out


Lactate Level


2/12/21 16:30: Lactic Acid Level 1.25





Lactic Acid Level





Laboratory Tests








Test


 2/12/21


16:30


 


Lactic Acid Level


 1.25 MMOL/L


(0.50-2.00)











Procedures/Interventions


Date of ETT Placement:  Nov 7, 2020


Time of ETT Placement:  1348





Progress/Results/Core Measures


Suspected Sepsis


Recent Fever Within 48 Hours:  No


Infection Criteria Present:  None


New/Unexplained  Altered Menta:  Yes


Sepsis Screen:  No Definite Risk


SIRS


Temperature: 


Pulse: 53 


Respiratory Rate: 16


 


Laboratory Tests


2/12/21 15:43: White Blood Count 6.9


Blood Pressure 111 /71 


Mean: 84


 


2/12/21 16:30: Lactic Acid Level 1.25


Laboratory Tests


2/12/21 15:43: 


Creatinine 1.38H, INR Comment 1.0, Platelet Count 145, Total Bilirubin 0.5








Results/Orders


Lab Results





Laboratory Tests








Test


 2/12/21


15:43 2/12/21


16:30 2/12/21


17:10 Range/Units


 


 


White Blood Count


 6.9 


 


 


 4.3-11.0


10^3/uL


 


Red Blood Count


 4.40 


 


 


 4.35-5.85


10^6/uL


 


Hemoglobin 12.6 L   13.3-17.7  G/DL


 


Hematocrit 38 L   40-54  %


 


Mean Corpuscular Volume 87    80-99  FL


 


Mean Corpuscular Hemoglobin 29    25-34  PG


 


Mean Corpuscular Hemoglobin


Concent 33 


 


 


 32-36  G/DL





 


Red Cell Distribution Width 16.4 H   10.0-14.5  %


 


Platelet Count


 145 


 


 


 130-400


10^3/uL


 


Mean Platelet Volume 11.5 H   7.4-10.4  FL


 


Neutrophils (%) (Auto) 57    42-75  %


 


Lymphocytes (%) (Auto) 27    12-44  %


 


Monocytes (%) (Auto) 13 H   0-12  %


 


Eosinophils (%) (Auto) 3    0-10  %


 


Basophils (%) (Auto) 0    0-10  %


 


Neutrophils # (Auto) 3.9    1.8-7.8  X 10^3


 


Lymphocytes # (Auto) 1.9    1.0-4.0  X 10^3


 


Monocytes # (Auto) 0.9    0.0-1.0  X 10^3


 


Eosinophils # (Auto)


 0.2 


 


 


 0.0-0.3


10^3/uL


 


Basophils # (Auto)


 0.0 


 


 


 0.0-0.1


10^3/uL


 


Prothrombin Time 13.3    12.2-14.7  SEC


 


INR Comment 1.0    0.8-1.4  


 


Activated Partial


Thromboplast Time 28 


 


 


 24-35  SEC





 


Sodium Level 140    135-145  MMOL/L


 


Potassium Level 3.5 L   3.6-5.0  MMOL/L


 


Chloride Level 101      MMOL/L


 


Carbon Dioxide Level 30    21-32  MMOL/L


 


Anion Gap 9    5-14  MMOL/L


 


Blood Urea Nitrogen 13    7-18  MG/DL


 


Creatinine


 1.38 H


 


 


 0.60-1.30


MG/DL


 


Estimat Glomerular Filtration


Rate > 60 


 


 


  





 


BUN/Creatinine Ratio 9     


 


Glucose Level 95      MG/DL


 


Calcium Level 9.3    8.5-10.1  MG/DL


 


Corrected Calcium 9.9    8.5-10.1  MG/DL


 


Total Bilirubin 0.5    0.1-1.0  MG/DL


 


Aspartate Amino Transf


(AST/SGOT) 13 


 


 


 5-34  U/L





 


Alanine Aminotransferase


(ALT/SGPT) 5 


 


 


 0-55  U/L





 


Alkaline Phosphatase 39 L     U/L


 


Total Protein 6.6    6.4-8.2  GM/DL


 


Albumin 3.3    3.2-4.5  GM/DL


 


Serum Alcohol < 10    <10  MG/DL


 


Lactic Acid Level


 


 1.25 


 


 0.50-2.00


MMOL/L


 


Urine Color   YELLOW   


 


Urine Clarity


 


 


 SLIGHTLY


CLOUDY  





 


Urine pH   5.5  5-9  


 


Urine Specific Gravity   >=1.030  1.016-1.022  


 


Urine Protein   TRACE H NEGATIVE  


 


Urine Glucose (UA)   NEGATIVE  NEGATIVE  


 


Urine Ketones   TRACE H NEGATIVE  


 


Urine Nitrite   NEGATIVE  NEGATIVE  


 


Urine Bilirubin   1+ H NEGATIVE  


 


Urine Urobilinogen   0.2  < = 1.0  MG/DL


 


Urine Leukocyte Esterase   NEGATIVE  NEGATIVE  


 


Urine RBC (Auto)   NEGATIVE  NEGATIVE  


 


Urine RBC   NONE   /HPF


 


Urine WBC   10-25 H  /HPF


 


Urine Squamous Epithelial


Cells 


 


 25-50 H


  /HPF





 


Urine Crystals   NONE   /LPF


 


Urine Bacteria   LARGE H  /HPF


 


Urine Casts   PRESENT   /LPF


 


Urine Hyaline Casts   2-5 H  /LPF


 


Urine Mucus   NEGATIVE   /LPF


 


Urine Culture Indicated   NO   


 


Urine Opiates Screen   NEGATIVE  NEGATIVE  


 


Urine Oxycodone Screen   NEGATIVE  NEGATIVE  


 


Urine Methadone Screen   NEGATIVE  NEGATIVE  


 


Urine Propoxyphene Screen   NEGATIVE  NEGATIVE  


 


Urine Barbiturates Screen   NEGATIVE  NEGATIVE  


 


Ur Tricyclic Antidepressants


Screen 


 


 POSITIVE H


 NEGATIVE  





 


Urine Phencyclidine Screen   NEGATIVE  NEGATIVE  


 


Urine Amphetamines Screen   NEGATIVE  NEGATIVE  


 


Urine Methamphetamines Screen   NEGATIVE  NEGATIVE  


 


Urine Benzodiazepines Screen   NEGATIVE  NEGATIVE  


 


Urine Cocaine Screen   NEGATIVE  NEGATIVE  


 


Urine Cannabinoids Screen   NEGATIVE  NEGATIVE  








My Orders





Orders - NETTIE DA SILVA DO


Cbc With Automated Diff (2/12/21 15:48)


Comprehensive Metabolic Panel (2/12/21 15:48)


Blood Culture (2/12/21 15:48)


Sputum Culture (2/12/21 15:48)


Urinalysis (2/12/21 15:48)


Urine Culture (2/12/21 15:48)


Protime With Inr (2/12/21 15:48)


Partial Thromboplastin Time (2/12/21 15:48)


Chest 1 View Ap/Pa Only (2/12/21 15:48)


Ed Iv/Invasive Line Start (2/12/21 15:48)


Ed Iv/Invasive Line Start (2/12/21 15:48)


Vital Signs Adult Sepsis Patie Q15M (2/12/21 15:48)


O2 (2/12/21 15:48)


Remove Rings In Anticipation O (2/12/21 15:48)


Lactic Acid Analyzer (2/12/21 15:48)


Ns Iv 1000 Ml (Sodium Chloride 0.9%) (2/12/21 16:00)


Cefepime Injection (Maxipime Injection) (2/12/21 16:00)


Ct Head Wo (2/12/21 15:48)


Drug Screen Stat (Urine) (2/12/21 15:49)


Alcohol (2/12/21 15:49)


Ammonia (2/12/21 15:43)


Hydrocortisone Injection (Solu-Cortef In (2/12/21 18:30)





Medications Given in ED





Current Medications








 Medications  Dose


 Ordered  Sig/Kenna


 Route  Start Time


 Stop Time Status Last Admin


Dose Admin


 


 Cefepime HCl 1000


 mg/Sterile Water  10 ml @ 


 200 mls/hr  ONCE  ONCE


 IV  2/12/21 16:00


 2/12/21 16:02 DC 2/12/21 17:19


200 MLS/HR








Vital Signs/I&O











 2/12/21





 15:42


 


Temp 36.1


 


Pulse 53


 


Resp 16


 


B/P (MAP) 111/71 (84)


 


Pulse Ox 98


 


O2 Delivery Room Air





Capillary Refill : Less Than 3 Seconds








Blood Pressure Mean:                    84











Departure


Communication (Admissions)


Patient with bradycardia, depressed mental status and exophthalmos with history 

of hypothyroidism.  Patient more alert and talkative prior to transfer but 

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 

myasthenia versus polypharmacy versus possible sepsis versus encephalopathy..  

Solu-Cortef and antibiotics given.  Dr. Bess assumes care of the patient.  

The patient will be transferred to [facility] ICU.  Dr. Cortes 

accepts the care of the patient





Impression





   Primary Impression:  


   Altered mental status


   Additional Impressions:  


   Hypothyroidism


   Bradycardia


Disposition:  09 ADMITTED AS INPATIENT


Condition:  Critical





Admissions


Decision to Admit Reason:  Admit from ER (General)


Decision to Admit/Date:  Feb 12, 2021


Time/Decision to Admit Time:  18:15





Departure-Patient Inst.


Referrals:  


NO,LOCAL PHYSICIAN (PCP/Family)


Primary Care Physician











NETTIE DA SILVA DO                   Feb 12, 2021 17:47

## 2021-02-13 VITALS — DIASTOLIC BLOOD PRESSURE: 79 MMHG | SYSTOLIC BLOOD PRESSURE: 164 MMHG

## 2021-02-13 VITALS — DIASTOLIC BLOOD PRESSURE: 71 MMHG | SYSTOLIC BLOOD PRESSURE: 133 MMHG

## 2021-02-13 VITALS — DIASTOLIC BLOOD PRESSURE: 87 MMHG | SYSTOLIC BLOOD PRESSURE: 149 MMHG

## 2021-02-13 VITALS — SYSTOLIC BLOOD PRESSURE: 110 MMHG | DIASTOLIC BLOOD PRESSURE: 69 MMHG

## 2021-02-13 VITALS — SYSTOLIC BLOOD PRESSURE: 122 MMHG | DIASTOLIC BLOOD PRESSURE: 69 MMHG

## 2021-02-13 VITALS — SYSTOLIC BLOOD PRESSURE: 111 MMHG | DIASTOLIC BLOOD PRESSURE: 77 MMHG

## 2021-02-13 VITALS — SYSTOLIC BLOOD PRESSURE: 136 MMHG | DIASTOLIC BLOOD PRESSURE: 81 MMHG

## 2021-02-13 VITALS — SYSTOLIC BLOOD PRESSURE: 102 MMHG | DIASTOLIC BLOOD PRESSURE: 64 MMHG

## 2021-02-13 VITALS — DIASTOLIC BLOOD PRESSURE: 98 MMHG | SYSTOLIC BLOOD PRESSURE: 170 MMHG

## 2021-02-13 VITALS — SYSTOLIC BLOOD PRESSURE: 105 MMHG | DIASTOLIC BLOOD PRESSURE: 67 MMHG

## 2021-02-13 VITALS — DIASTOLIC BLOOD PRESSURE: 83 MMHG | SYSTOLIC BLOOD PRESSURE: 115 MMHG

## 2021-02-13 VITALS — SYSTOLIC BLOOD PRESSURE: 136 MMHG | DIASTOLIC BLOOD PRESSURE: 70 MMHG

## 2021-02-13 VITALS — SYSTOLIC BLOOD PRESSURE: 126 MMHG | DIASTOLIC BLOOD PRESSURE: 77 MMHG

## 2021-02-13 VITALS — DIASTOLIC BLOOD PRESSURE: 72 MMHG | SYSTOLIC BLOOD PRESSURE: 87 MMHG

## 2021-02-13 VITALS — SYSTOLIC BLOOD PRESSURE: 100 MMHG | DIASTOLIC BLOOD PRESSURE: 65 MMHG

## 2021-02-13 VITALS — DIASTOLIC BLOOD PRESSURE: 102 MMHG | SYSTOLIC BLOOD PRESSURE: 184 MMHG

## 2021-02-13 VITALS — DIASTOLIC BLOOD PRESSURE: 64 MMHG | SYSTOLIC BLOOD PRESSURE: 112 MMHG

## 2021-02-13 VITALS — DIASTOLIC BLOOD PRESSURE: 106 MMHG | SYSTOLIC BLOOD PRESSURE: 118 MMHG

## 2021-02-13 VITALS — SYSTOLIC BLOOD PRESSURE: 127 MMHG | DIASTOLIC BLOOD PRESSURE: 91 MMHG

## 2021-02-13 VITALS — SYSTOLIC BLOOD PRESSURE: 145 MMHG | DIASTOLIC BLOOD PRESSURE: 84 MMHG

## 2021-02-13 VITALS — DIASTOLIC BLOOD PRESSURE: 83 MMHG | SYSTOLIC BLOOD PRESSURE: 111 MMHG

## 2021-02-13 VITALS — SYSTOLIC BLOOD PRESSURE: 112 MMHG | DIASTOLIC BLOOD PRESSURE: 81 MMHG

## 2021-02-13 VITALS — DIASTOLIC BLOOD PRESSURE: 88 MMHG | SYSTOLIC BLOOD PRESSURE: 101 MMHG

## 2021-02-13 VITALS — DIASTOLIC BLOOD PRESSURE: 67 MMHG | SYSTOLIC BLOOD PRESSURE: 125 MMHG

## 2021-02-13 LAB
BASOPHILS # BLD AUTO: 0 10^3/UL (ref 0–0.1)
BASOPHILS NFR BLD AUTO: 0 % (ref 0–10)
BUN/CREAT SERPL: 10
CALCIUM SERPL-MCNC: 8.7 MG/DL (ref 8.5–10.1)
CHLORIDE SERPL-SCNC: 105 MMOL/L (ref 98–107)
CO2 SERPL-SCNC: 25 MMOL/L (ref 21–32)
CREAT SERPL-MCNC: 1.27 MG/DL (ref 0.6–1.3)
EOSINOPHIL # BLD AUTO: 0 10^3/UL (ref 0–0.3)
EOSINOPHIL NFR BLD AUTO: 0 % (ref 0–10)
GFR SERPLBLD BASED ON 1.73 SQ M-ARVRAT: > 60 ML/MIN
GLUCOSE SERPL-MCNC: 133 MG/DL (ref 70–105)
HCT VFR BLD CALC: 38 % (ref 40–54)
HGB BLD-MCNC: 12.2 G/DL (ref 13.3–17.7)
LYMPHOCYTES # BLD AUTO: 0.7 10^3/UL (ref 1–4)
LYMPHOCYTES NFR BLD AUTO: 17 % (ref 12–44)
MAGNESIUM SERPL-MCNC: 1.9 MG/DL (ref 1.6–2.4)
MANUAL DIFFERENTIAL PERFORMED BLD QL: NO
MCH RBC QN AUTO: 28 PG (ref 25–34)
MCHC RBC AUTO-ENTMCNC: 32 G/DL (ref 32–36)
MCV RBC AUTO: 88 FL (ref 80–99)
MONOCYTES # BLD AUTO: 0.3 10^3/UL (ref 0–1)
MONOCYTES NFR BLD AUTO: 7 % (ref 0–12)
NEUTROPHILS # BLD AUTO: 2.9 10^3/UL (ref 1.8–7.8)
NEUTROPHILS NFR BLD AUTO: 75 % (ref 42–75)
PHOSPHATE SERPL-MCNC: 2.5 MG/DL (ref 2.3–4.7)
PLATELET # BLD: 131 10^3/UL (ref 130–400)
PMV BLD AUTO: 11.9 FL (ref 9–12.2)
POTASSIUM SERPL-SCNC: 4.4 MMOL/L (ref 3.6–5)
SODIUM SERPL-SCNC: 141 MMOL/L (ref 135–145)
WBC # BLD AUTO: 3.9 10^3/UL (ref 4.3–11)

## 2021-02-13 RX ADMIN — SODIUM CHLORIDE SCH MLS/HR: 900 INJECTION INTRAVENOUS at 12:38

## 2021-02-13 RX ADMIN — SODIUM CHLORIDE SCH MLS/HR: 900 INJECTION INTRAVENOUS at 00:25

## 2021-02-13 RX ADMIN — POTASSIUM CHLORIDE SCH MLS/HR: 200 INJECTION, SOLUTION INTRAVENOUS at 04:02

## 2021-02-13 RX ADMIN — SODIUM CHLORIDE SCH MLS/HR: 900 INJECTION INTRAVENOUS at 05:30

## 2021-02-13 RX ADMIN — SODIUM CHLORIDE SCH MLS/HR: 900 INJECTION INTRAVENOUS at 18:39

## 2021-02-13 RX ADMIN — DEXTROSE MONOHYDRATE AND SODIUM CHLORIDE SCH MLS/HR: 5; .45 INJECTION, SOLUTION INTRAVENOUS at 04:21

## 2021-02-13 RX ADMIN — MAGNESIUM SULFATE IN DEXTROSE SCH MLS/HR: 10 INJECTION, SOLUTION INTRAVENOUS at 04:02

## 2021-02-13 RX ADMIN — POTASSIUM CHLORIDE SCH MEQ: 1500 TABLET, EXTENDED RELEASE ORAL at 04:02

## 2021-02-13 RX ADMIN — ZIPRASIDONE MESYLATE PRN MLS/HR: 20 INJECTION, POWDER, LYOPHILIZED, FOR SOLUTION INTRAMUSCULAR at 12:38

## 2021-02-13 RX ADMIN — SODIUM CHLORIDE SCH MLS/HR: 900 INJECTION INTRAVENOUS at 23:36

## 2021-02-13 NOTE — HISTORY & PHYSICAL-HOSPITALIST
History of Present Illness


HPI/Chief Complaint


CC: AMS





HPI: This is a 65yoAAM known to me from prior admits, both for severe 

encephalopathy, who has a diagnosis of alcoholism and mental illness who 

presented to the [facility] ER with confusion. Apparently he had not been taking 

his meds and it appears he has cycles of mental illness that occur every few 

months similar to this event. 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

appeared to be the dx and he could not run a TSH but TSH was essentially normal 

on check once he arrived at [facility]. Patient has become aggressive requiring 

anti-psychotics. Patient appears to have lost 150# since I last saw him.


Source:  RN/MD


Exam Limitations:  clinical condition


Date Seen


2/13/21


Time Seen by a Provider:  10:30


Attending Physician


Maria M Damon Pankaj K MD


Referring Physician





Date of Admission


Feb 12, 2021 at 19:57





Home Medications & Allergies


Home Medications


Reviewed patient Home Medication Reconciliation performed by pharmacy medication

reconciliations technician and/or nursing.


Patients Allergies have been reviewed.





Allergies





Allergies


Coded Allergies


  No Known Drug Allergies (Unverified6/16/19)








Past Medical-Social-Family Hx


Past Med/Social Hx:  Reviewed Nursing Past Med/Soc Hx, Reviewed and Corrections 

made


Patient Social History


Marrital Status:  single


Employed/Student:  retired


Alcohol Use:  Past History


Alcohol Beverage of Choice:  Beer, Whiskey


Recreational Drug Use:  No


Smoking Status:  Never a Smoker


Type Used:  Smokeless Tobacco


2nd Hand Smoke Exposure:  No


Recent Foreign Travel:  No


Contact w/other who traveled:  No


Recent Hopitalizations:  No


Recent Infectious Disease Expo:  No





Seasonal Allergies


Seasonal Allergies:  No





Past Medical History


Surgeries:  Gallbladder


Currently Using CPAP:  No


Currently Using BIPAP:  No


Cardiac:  Hypertension


Musculoskeletal:  Arthritis, Chronic Back Pain


Loss of Vision:  Denies


Hearing Impairment:  Denies


Psychosocial:  Anxiety


History of Blood Disorders:  No


Adverse Reaction to Blood Gr[place]on:  No





Family History


Diabetes, GI Disease, Other Conditions/Hx





Review of Systems


Constitutional:  see HPI





Physical Exam


Physical Exam


Vital Signs





Vital Signs - First Documented








 2/12/21





 15:42


 


Temp 36.1


 


Pulse 53


 


Resp 16


 


B/P (MAP) 111/71 (84)


 


Pulse Ox 98


 


O2 Delivery Room Air





Capillary Refill : Less Than 3 Seconds


Height, Weight, BMI


Height: 5'7.50"


Weight: 365lbs. 0oz. 165.512818fc; 28.47 BMI


Method:Estimated


General Appearance:  No Apparent Distress, Chronically ill


Eyes:  Right Eye Normal Inspection, Right Eye PERRL


HEENT:  PERRL/EOMI, Normal ENT Inspection, Pharynx Normal, Moist Mucous 

Membranes


Neck:  Full Range of Motion, Normal Inspection, Non Tender


Respiratory:  Chest Non Tender, Lungs Clear, Normal Breath Sounds, No Accessory 

Muscle Use, No Respiratory Distress


Cardiovascular:  Regular Rate, Rhythm, No Edema, No Gallop, No JVD, No Murmur, 

Normal Peripheral Pulses


Gastrointestinal:  Normal Bowel Sounds, No Organomegaly, No Pulsatile Mass, Non 

Tender, Soft


Back:  Normal Inspection, No CVA Tenderness, No Vertebral Tenderness


Extremity:  Normal Capillary Refill, Normal Inspection, Normal Range of Motion, 

Non Tender, No Calf Tenderness, No Pedal Edema


Neurologic/Psychiatric:  Disoriented


Skin:  Normal Color, Warm/Dry


Lymphatic:  No Adenopathy





Results


Results/Procedures


Labs


Laboratory Tests


2/12/21 15:43








2/13/21 02:50








Patient resulted labs reviewed.





Assessment/Plan


Admission Diagnosis


Assessment:


AMS


Mental illness


h/o ETOHism


HTN


Bradycardia





Plan:


ICU care due to high risk for aggression


Bradycardia consulted Cardiology


Admission Status:  Inpatient Order (span 2 midnights)


Reason for Inpatient Admission:  


ams





Diagnosis/Problems


Diagnosis/Problems





(1) Acute metabolic encephalopathy


Status:  Acute


(2) Essential hypertension


Status:  Chronic











MARIA M DAMON DO                Feb 13, 2021 10:56

## 2021-02-13 NOTE — CONSULTATION-CARDIOLOGY
HPI-Cardiology


Cardiology Consultation:


Date of Consultation


2/13/21


Time Seen by a Provider:  14:15


Date of Admission





Attending Physician


Maria M Cortes DO


Admitting Physician


Jeremiah Taveras MD


Consulting Physician


RAY VALLE MD, MA, FACP, FACC, FSCAI, CCDS





HPI:


Chief Complaint:





Physician requesting consult: Dr Cortes





Reason for Cardiology consultation: Bradycardia








HPI


64 yo man admitted with mental status changes (poor responsiveness) to Dr Cortes's service on 2/12/20. He is currently verbally unresponsive and not able 

to provide any history or answer any questions. Apparently, had been admitted 

with unresponsiveness, became aggressive and agitated this morning, has been 

sedated by the Hospitalist svjose (Dr Cortes)





Review of Systems-Cardiology


Review of Systems


Constitutional:  other (Pt is unresponsive at this time and not able to provide 

any history)





All Other Systems Reviewed


Negative Unless Noted:  Yes





PMH-Social-Family Hx


Patient Social History


Smoking Status:  Never a Smoker


2nd Hand Smoke Exposure:  No





Past Medical History


PMH


As described under Assessment.





Family Medical History


Family Medical History:  


Fam history cannot be obtained from the pt at the time of writing this note





Allergies and Home Medications


Allergies


Coded Allergies:  


     No Known Drug Allergies (Unverified , 6/16/19)





Home Medications


Divalproex Sodium 250 Mg Tab.er.24h, 750 MG PO BID, (Reported)


   TAKES 3 (250MG) TABS 


Famotidine 20 Mg Tablet, 20 MG PO BID


   Prescribed by: MARIA M CORTES on 11/16/20 1027


Hydrochlorothiazide 25 Mg Tablet, 25 MG PO DAILY


   Prescribed by: MARIA M CORTES on 11/16/20 1027


Lactulose 20 Gm/30 Ml Solution, 10 GM PO BID


   Prescribed by: MARIA M CORTES on 11/16/20 1027


Levothyroxine Sodium 100 Mcg Tablet, 100 MCG PO DAILY, (Reported)


Metoprolol Tartrate 25 Mg Tablet, 25 MG PO BID


   Prescribed by: MARIA M CORTES on 11/16/20 1027


Pantoprazole Sodium 40 Mg Tablet.dr, 40 MG PO DAILY, (Reported)


Potassium Chloride 20 Meq Tablet.er, 20 MEQ PO BID, (Reported)


Quetiapine Fumarate 25 Mg Tablet, 75 MG PO TID


   Prescribed by: ENZO MAXWELL on 2/13/21 0008


Rifaximin 550 Mg Tablet, 550 MG PO BID


   Prescribed by: MARIA M CORTES on 11/16/20 1027


[Folic Acid] 1 TAB, 1 MG PO DAILY, (Reported)





Patient Home Medication List


Home Medication List Reviewed:  Yes





Physical Exam-Cardiology


Physical Exam


Vital Signs/I&O











 2/13/21 2/13/21 2/13/21 2/13/21





 03:00 04:00 04:00 05:00


 


Temp   36.0 


 


Pulse 42 49  63


 


Resp 13 18  


 


B/P (MAP) 112/64 (80) 101/88 (92)  87/72 (77)


 


Pulse Ox 100   95


 


O2 Delivery Room Air Room Air  Room Air


 


    





 2/13/21 2/13/21 2/13/21 2/13/21





 06:00 06:57 07:00 07:00


 


Temp  36.8  


 


Pulse 45  55 41


 


Resp 28  19 


 


B/P (MAP) 110/69 (83)  127/91 (103) 


 


Pulse Ox 99  100 


 


O2 Delivery Room Air  Room Air 


 


    





 2/13/21 2/13/21 2/13/21 2/13/21





 08:00 08:00 09:00 09:59


 


Pulse  66 54 56


 


Resp  19 30 20


 


B/P (MAP)  111/77 (88) 112/81 (91) 105/67 (80)


 


Pulse Ox 99 100 97 100


 


O2 Delivery Room Air Room Air Room Air Room Air





 2/13/21 2/13/21 2/13/21 2/13/21





 11:04 11:57 12:58 13:00


 


Temp  36.0  


 


Pulse 52 50 53 53


 


Resp 19 12  12


 


B/P (MAP) 102/64 (77) 115/83 (94)  136/70 (92)


 


Pulse Ox 97 96  93


 


O2 Delivery Room Air Room Air  Room Air


 


    





 2/13/21   





 14:00   


 


Pulse 66   


 


Resp 16   


 


B/P (MAP) 149/87 (107)   


 


Pulse Ox 96   


 


O2 Delivery Room Air   














 2/13/21





 00:00


 


Intake Total 1210 ml


 


Output Total 100 ml


 


Balance 1110 ml





Capillary Refill : Less Than 3 Seconds


Constitutional:  other (unresponisve)


HEENT:  PERRL


Neck:  carotid pulses are 2 + bilaterally, with good upstrokes


Respiratory:  No accessory muscle use; other (fair to good, bilatral air entry)


Cardiovascular:  regular rate-rhythm, S1 and S2, systolic murmur (soft MENDY at 

card base)


Gastrointestinal:  No tender, No soft, No guarding, No rebound; audible bowel 

sounds


Extremities:  No clubbing, No cyanosis, No significant edema


Neurologic/Psychiatric:  other (unresponsive)


Skin:  No rash on exposed areas, No ulcerations on exposed areas





Data Review


Labs


Laboratory Tests


2/12/21 15:43: 


White Blood Count 6.9, Red Blood Count 4.40, Hemoglobin 12.6L, Hematocrit 38L, 

Mean Corpuscular Volume 87, Mean Corpuscular Hemoglobin 29, Mean Corpuscular H

emoglobin Concent 33, Red Cell Distribution Width 16.4H, Platelet Count 145, 

Mean Platelet Volume 11.5H, Neutrophils (%) (Auto) 57, Lymphocytes (%) (Auto) 

27, Monocytes (%) (Auto) 13H, Eosinophils (%) (Auto) 3, Basophils (%) (Auto) 0, 

Neutrophils # (Auto) 3.9, Lymphocytes # (Auto) 1.9, Monocytes # (Auto) 0.9, 

Eosinophils # (Auto) 0.2, Basophils # (Auto) 0.0, Prothrombin Time 13.3, INR 

Comment 1.0, Activated Partial Thromboplast Time 28, Sodium Level 140, Potassium

Level 3.5L, Chloride Level 101, Carbon Dioxide Level 30, Anion Gap 9, Blood Urea

Nitrogen 13, Creatinine 1.38H, Estimat Glomerular Filtration Rate > 60, 

BUN/Creatinine Ratio 9, Glucose Level 95, Calcium Level 9.3, Corrected Calcium 

9.9, Total Bilirubin 0.5, Aspartate Amino Transf (AST/SGOT) 13, Alanine 

Aminotransferase (ALT/SGPT) 5, Alkaline Phosphatase 39L, Ammonia 45H, Total Prot

ein 6.6, Albumin 3.3, Serum Alcohol < 10


2/12/21 16:30: Lactic Acid Level 1.25


2/12/21 17:10: 


Urine Color YELLOW, Urine Clarity SLIGHTLY CLOUDY, Urine pH 5.5, Urine Specific 

Gravity >=1.030, Urine Protein TRACEH, Urine Glucose (UA) NEGATIVE, Urine 

Ketones TRACEH, Urine Nitrite NEGATIVE, Urine Bilirubin 1+H, Urine Urobilinogen 

0.2, Urine Leukocyte Esterase NEGATIVE, Urine RBC (Auto) NEGATIVE, Urine RBC 

NONE, Urine WBC 10-25H, Urine Squamous Epithelial Cells 25-50H, Urine Crystals 

NONE, Urine Bacteria LARGEH, Urine Casts PRESENT, Urine Hyaline Casts 2-5H, 

Urine Mucus NEGATIVE, Urine Culture Indicated NO, Urine Opiates Screen NEGATIVE,

Urine Oxycodone Screen NEGATIVE, Urine Methadone Screen NEGATIVE, Urine 

Propoxyphene Screen NEGATIVE, Urine Barbiturates Screen NEGATIVE, Ur Tricyclic 

Antidepressants Screen POSITIVEH, Urine Phencyclidine Screen NEGATIVE, Urine 

Amphetamines Screen NEGATIVE, Urine Methamphetamines Screen NEGATIVE, Urine 

Benzodiazepines Screen NEGATIVE, Urine Cocaine Screen NEGATIVE, Urine 

Cannabinoids Screen NEGATIVE


2/12/21 20:32: Thyroid Stimulating Hormone (TSH) 0.09L


2/13/21 02:50: 


White Blood Count 3.9L, Red Blood Count 4.30, Hemoglobin 12.2L, Hematocrit 38L, 

Mean Corpuscular Volume 88, Mean Corpuscular Hemoglobin 28, Mean Corpuscular 

Hemoglobin Concent 32, Red Cell Distribution Width 15.9H, Platelet Count 131, 

Mean Platelet Volume 11.9, Immature Granulocyte % (Auto) 0, Neutrophils (%) 

(Auto) 75, Lymphocytes (%) (Auto) 17, Monocytes (%) (Auto) 7, Eosinophils (%) (

Auto) 0, Basophils (%) (Auto) 0, Neutrophils # (Auto) 2.9, Lymphocytes # (Auto) 

0.7L, Monocytes # (Auto) 0.3, Eosinophils # (Auto) 0.0, Basophils # (Auto) 0.0, 

Immature Granulocyte # (Auto) 0.0, Sodium Level 141, Potassium Level 4.4, 

Chloride Level 105, Carbon Dioxide Level 25, Anion Gap 11, Blood Urea Nitrogen 

13, Creatinine 1.27, Estimat Glomerular Filtration Rate > 60, BUN/Creatinine 

Ratio 10, Glucose Level 133H, Calcium Level 8.7, Phosphorus Level 2.5, Magnesium

Level 1.9





Laboratory Tests


2/12/21 15:43








2/13/21 02:50











A/P-Cardiology


Assessment/Admission Diagnosis





Unresponsiveness / mental status change, etiology undetermined, managed by Dr Cortes





Intermittent sinus bradycardia w/o any hemodynamic compromise





H/o hypothyroidism


- low TSH on 2/12/21, indicative of iatrogenic hyperthyroidism





Discussion and Recomendations





* Continue tele


* Monitor and correct labs











RAY VALLE MD [facility] CCDS   Feb 13, 2021 14:42

## 2021-02-13 NOTE — PULMONARY CONSULTATION
History of Present Illness


History of Present Illness


Date Seen by Provider:  Feb 13, 2021


Time Seen by Provider:  05:40


Date of Admission








Allergies and Home Medications


Allergies


Coded Allergies:  


     No Known Drug Allergies (Unverified , 6/16/19)





Home Medications


Divalproex Sodium 250 Mg Tab.er.24h, 750 MG PO BID, (Reported)


   TAKES 3 (250MG) TABS 


Famotidine 20 Mg Tablet, 20 MG PO BID


   Prescribed by: ABISAI CORTES on 11/16/20 1027


Hydrochlorothiazide 25 Mg Tablet, 25 MG PO DAILY


   Prescribed by: ABISAI CORTES on 11/16/20 1027


Lactulose 20 Gm/30 Ml Solution, 10 GM PO BID


   Prescribed by: ABISAI CORTES on 11/16/20 1027


Levothyroxine Sodium 100 Mcg Tablet, 100 MCG PO DAILY, (Reported)


Metoprolol Tartrate 25 Mg Tablet, 25 MG PO BID


   Prescribed by: ABISAI CORTES on 11/16/20 1027


Pantoprazole Sodium 40 Mg Tablet.dr, 40 MG PO DAILY, (Reported)


Potassium Chloride 20 Meq Tablet.er, 20 MEQ PO BID, (Reported)


Quetiapine Fumarate 25 Mg Tablet, 75 MG PO TID


   Prescribed by: ENZO MAXWELL on 2/13/21 0008


Rifaximin 550 Mg Tablet, 550 MG PO BID


   Prescribed by: ABISAI CORTES on 11/16/20 1027


[Folic Acid] 1 TAB, 1 MG PO DAILY, (Reported)





Past Medical-Social-Family Hx


Past Med/Social Hx:  Reviewed Nursing Past Med/Soc Hx


Patient Social History


Alcohol Use:  Denies Use


Number of Drinks Today:  GG


Alcohol Beverage of Choice:  Beer, Whiskey


Smoking Status:  Never a Smoker


Type Used:  Smokeless Tobacco


2nd Hand Smoke Exposure:  No


Recent Infectious Disease Expo:  No


Recent Hopitalizations:  No





Seasonal Allergies


Seasonal Allergies:  No





Past Medical History


Surgeries:  Yes


Gallbladder


Respiratory:  No


Pneumonia


Currently Using CPAP:  No


Currently Using BIPAP:  No


Cardiac:  Yes


Hypertension


Neurological:  No


Genitourinary:  No


Gastrointestinal:  No


Musculoskeletal:  Yes


Arthritis, Chronic Back Pain


Endocrine:  No


HEENT:  No


Loss of Vision:  Denies


Hearing Impairment:  Denies


Cancer:  No


Psychosocial:  Yes


Anxiety


Integumentary:  No


Blood Disorders:  No


Adverse Reaction/Blood Tranf:  No





Family Medical History


Diabetes, GI Disease, Other Conditions/Hx





Review of Systems


Time Seen by Provider:  05:45





Sepsis Event


Evaluation


Height, Weight, BMI


Height: 5'7.50"


Weight: 365lbs. 0oz. 165.554698ci; 28.47 BMI


Method:Estimated





Exam


Exam





Vital Signs








  Date Time  Temp Pulse Resp B/P (MAP) Pulse Ox O2 Delivery O2 Flow Rate FiO2


 


2/13/21 05:00  63  87/72 (77) 95 Room Air  


 


2/13/21 04:00 36.0       


 


2/13/21 04:00  49 18 101/88 (92)  Room Air  


 


2/13/21 03:00  42 13 112/64 (80) 100 Room Air  


 


2/13/21 02:00  42 14 118/106 (110) 99 Room Air  


 


2/13/21 01:00  46 11 111/83 (92) 97 Room Air  


 


2/13/21 00:33  47      


 


2/13/21 00:00  46 11 100/65 (77) 97 Room Air  


 


2/12/21 23:58     98 Room Air  


 


2/12/21 23:00  49 12 110/69 (83) 98 Room Air  


 


2/12/21 22:00  48 11 101/72 (82) 96 Room Air  


 


2/12/21 21:00  47 11 96/64 (75) 96 Room Air  


 


2/12/21 20:20  45      


 


2/12/21 20:15 35.7 45 14 96/60 (72) 99 Room Air  


 


2/12/21 20:14  47      


 


2/12/21 20:00     99 Room Air  


 


2/12/21 18:46  49 16 118/55 99 Room Air  


 


2/12/21 15:42 36.1 53 16 111/71 (84) 98 Room Air  














I & O 


 


 2/13/21





 07:00


 


Intake Total 1210 ml


 


Output Total 575 ml


 


Balance 635 ml








Height & Weight


Height: 5'7.50"


Weight: 365lbs. 0oz. 165.823586zr; 28.47 BMI


Method:Estimated


General Appearance:  Other (Alerts to tactile stimulation)


HEENT:  PERRL/EOMI, Normal ENT Inspection, Pharynx Normal, Other (exophtalmos. )


Neck:  Full Range of Motion, Supple


Respiratory:  Lungs Clear ( Dry mucous membrane)


Cardiovascular:  Bradycardia


Capillary Refill:  Less Than 3 Seconds





Results


Lab


Laboratory Tests


2/12/21 15:43








2/13/21 02:50











Assessment/Plan


Assessment/Plan


metabolic encephalopathy with hx of alcohol and drug use 


   -Pt is more alert today 


   -Montior


   -UDS is negative 


Hx of alcohol dependance and withdrawal 


   -Monitor 


Bradycardia 


   -Monitor 


   -Cardiology following 


Hypothyroid 


   -TSH is low 


   -D/C MIGUEL Mejía DO              Feb 13, 2021 05:46

## 2021-02-14 VITALS — SYSTOLIC BLOOD PRESSURE: 156 MMHG | DIASTOLIC BLOOD PRESSURE: 88 MMHG

## 2021-02-14 VITALS — SYSTOLIC BLOOD PRESSURE: 146 MMHG | DIASTOLIC BLOOD PRESSURE: 74 MMHG

## 2021-02-14 VITALS — DIASTOLIC BLOOD PRESSURE: 67 MMHG | SYSTOLIC BLOOD PRESSURE: 130 MMHG

## 2021-02-14 VITALS — DIASTOLIC BLOOD PRESSURE: 70 MMHG | SYSTOLIC BLOOD PRESSURE: 141 MMHG

## 2021-02-14 VITALS — DIASTOLIC BLOOD PRESSURE: 72 MMHG | SYSTOLIC BLOOD PRESSURE: 111 MMHG

## 2021-02-14 VITALS — SYSTOLIC BLOOD PRESSURE: 128 MMHG | DIASTOLIC BLOOD PRESSURE: 93 MMHG

## 2021-02-14 VITALS — SYSTOLIC BLOOD PRESSURE: 120 MMHG | DIASTOLIC BLOOD PRESSURE: 92 MMHG

## 2021-02-14 VITALS — SYSTOLIC BLOOD PRESSURE: 128 MMHG | DIASTOLIC BLOOD PRESSURE: 82 MMHG

## 2021-02-14 VITALS — DIASTOLIC BLOOD PRESSURE: 72 MMHG | SYSTOLIC BLOOD PRESSURE: 146 MMHG

## 2021-02-14 VITALS — DIASTOLIC BLOOD PRESSURE: 65 MMHG | SYSTOLIC BLOOD PRESSURE: 114 MMHG

## 2021-02-14 VITALS — SYSTOLIC BLOOD PRESSURE: 124 MMHG | DIASTOLIC BLOOD PRESSURE: 87 MMHG

## 2021-02-14 LAB
BASOPHILS # BLD AUTO: 0.1 10^3/UL (ref 0–0.1)
BASOPHILS NFR BLD AUTO: 1 % (ref 0–10)
BUN/CREAT SERPL: 11
CALCIUM SERPL-MCNC: 9.5 MG/DL (ref 8.5–10.1)
CHLORIDE SERPL-SCNC: 109 MMOL/L (ref 98–107)
CO2 SERPL-SCNC: 25 MMOL/L (ref 21–32)
CREAT SERPL-MCNC: 0.99 MG/DL (ref 0.6–1.3)
EOSINOPHIL # BLD AUTO: 0.2 10^3/UL (ref 0–0.3)
EOSINOPHIL NFR BLD AUTO: 3 % (ref 0–10)
GFR SERPLBLD BASED ON 1.73 SQ M-ARVRAT: > 60 ML/MIN
GLUCOSE SERPL-MCNC: 69 MG/DL (ref 70–105)
HCT VFR BLD CALC: 43 % (ref 40–54)
HGB BLD-MCNC: 13.7 G/DL (ref 13.3–17.7)
LYMPHOCYTES # BLD AUTO: 1.7 10^3/UL (ref 1–4)
LYMPHOCYTES NFR BLD AUTO: 24 % (ref 12–44)
MAGNESIUM SERPL-MCNC: 2 MG/DL (ref 1.6–2.4)
MANUAL DIFFERENTIAL PERFORMED BLD QL: NO
MCH RBC QN AUTO: 29 PG (ref 25–34)
MCHC RBC AUTO-ENTMCNC: 32 G/DL (ref 32–36)
MCV RBC AUTO: 89 FL (ref 80–99)
MONOCYTES # BLD AUTO: 0.7 10^3/UL (ref 0–1)
MONOCYTES NFR BLD AUTO: 10 % (ref 0–12)
NEUTROPHILS # BLD AUTO: 4.4 10^3/UL (ref 1.8–7.8)
NEUTROPHILS NFR BLD AUTO: 62 % (ref 42–75)
PHOSPHATE SERPL-MCNC: 3.2 MG/DL (ref 2.3–4.7)
PLATELET # BLD: 133 10^3/UL (ref 130–400)
PMV BLD AUTO: 12.3 FL (ref 9–12.2)
POTASSIUM SERPL-SCNC: 3.8 MMOL/L (ref 3.6–5)
SODIUM SERPL-SCNC: 147 MMOL/L (ref 135–145)
WBC # BLD AUTO: 7 10^3/UL (ref 4.3–11)

## 2021-02-14 RX ADMIN — POTASSIUM CHLORIDE SCH MLS/HR: 200 INJECTION, SOLUTION INTRAVENOUS at 06:12

## 2021-02-14 RX ADMIN — LORAZEPAM PRN MG: 1 TABLET ORAL at 15:27

## 2021-02-14 RX ADMIN — SODIUM CHLORIDE SCH MLS/HR: 900 INJECTION INTRAVENOUS at 09:04

## 2021-02-14 RX ADMIN — POTASSIUM CHLORIDE SCH MEQ: 1500 TABLET, EXTENDED RELEASE ORAL at 18:28

## 2021-02-14 RX ADMIN — QUETIAPINE FUMARATE SCH MG: 25 TABLET, FILM COATED ORAL at 20:05

## 2021-02-14 RX ADMIN — ZIPRASIDONE MESYLATE PRN MLS/HR: 20 INJECTION, POWDER, LYOPHILIZED, FOR SOLUTION INTRAMUSCULAR at 06:13

## 2021-02-14 RX ADMIN — FAMOTIDINE SCH MG: 20 TABLET, FILM COATED ORAL at 20:04

## 2021-02-14 RX ADMIN — ACETAMINOPHEN PRN MG: 325 TABLET ORAL at 11:40

## 2021-02-14 RX ADMIN — LORAZEPAM PRN MG: 1 TABLET ORAL at 11:39

## 2021-02-14 RX ADMIN — POTASSIUM CHLORIDE SCH MEQ: 1500 TABLET, EXTENDED RELEASE ORAL at 06:12

## 2021-02-14 RX ADMIN — AMOXICILLIN AND CLAVULANATE POTASSIUM SCH MG: 875; 125 TABLET, FILM COATED ORAL at 18:28

## 2021-02-14 RX ADMIN — AMOXICILLIN AND CLAVULANATE POTASSIUM SCH MG: 875; 125 TABLET, FILM COATED ORAL at 11:39

## 2021-02-14 RX ADMIN — MAGNESIUM SULFATE IN DEXTROSE SCH MLS/HR: 10 INJECTION, SOLUTION INTRAVENOUS at 06:12

## 2021-02-14 RX ADMIN — METOPROLOL TARTRATE SCH MG: 25 TABLET, FILM COATED ORAL at 20:04

## 2021-02-14 NOTE — DIAGNOSTIC IMAGING REPORT
Indication: Altered mental status with dyspnea.



Comparison: 02/12/2021.



Discussion: Single portable view of the chest was obtained. Focal

infiltrate noted within the right lung base which may be

accentuated by the overlying rib end. This could represent

atelectasis or early pneumonia. Recommend continued radiographic

followup. Left lung is well-aerated. Normal heart size. No

pleural fluid or pneumothorax. No osseous abnormality.



Impression:

1. Nonspecific patchy infiltrate within the right lung base as

described, new.



Dictated by: 



  Dictated on workstation # [id number]

## 2021-02-14 NOTE — PULMONARY PROGRESS NOTE
Subjective


Time Seen by a Provider:  05:27


Subjective/Events-last exam


Pt still confused.





Sepsis Event


Evaluation


Height, Weight, BMI


Height: 5'7.50"


Weight: 365lbs. 0oz. 165.168049qg; 28.47 BMI


Method:Estimated





Focused Exam


Lactate Level


2/12/21 16:30: Lactic Acid Level 1.25





Exam


Exam





Vital Signs








  Date Time  Temp Pulse Resp B/P (MAP) Pulse Ox O2 Delivery O2 Flow Rate FiO2


 


2/14/21 05:00  67 16 128/93 (105) 100 Room Air  


 


2/14/21 04:40 36.2 65 18 141/70 (93) 99 Room Air  


 


2/14/21 04:00  62 16 141/70 (93) 99 Room Air  


 


2/14/21 03:00  49 16 146/74 (98) 100 Room Air  


 


2/14/21 02:00  51 18 156/88 (110) 100 Room Air  


 


2/14/21 01:00  50      


 


2/14/21 01:00  51 17 146/72 (96) 100 Room Air  


 


2/14/21 00:00  50 13 130/67 (88) 98 Room Air  


 


2/13/21 23:36 35.8    99 Room Air  


 


2/13/21 23:00  50 14 145/84 (104) 99 Room Air  


 


2/13/21 22:00  54 13 164/79 (107) 98 Room Air  


 


2/13/21 21:00  52 13 122/69 (86) 99 Room Air  


 


2/13/21 21:00     99 Room Air  


 


2/13/21 20:30 36.1       


 


2/13/21 20:00  50 14 170/98 (122) 100 Room Air  


 


2/13/21 19:00  50      


 


2/13/21 19:00  47 12 133/71 (91) 97 Room Air  


 


2/13/21 18:00  50 14 184/102 (129) 100 Room Air  


 


2/13/21 17:00  61 12 125/67 (86) 97 Room Air  


 


2/13/21 16:00  64 12 126/77 (93) 97 Room Air  


 


2/13/21 15:00  65 16 136/81 (99) 95 Room Air  


 


2/13/21 14:00  66 16 149/87 (107) 96 Room Air  


 


2/13/21 13:00  53 12 136/70 (92) 93 Room Air  


 


2/13/21 12:58  53      


 


2/13/21 11:57 36.0 50 12 115/83 (94) 96 Room Air  


 


2/13/21 11:04  52 19 102/64 (77) 97 Room Air  


 


2/13/21 09:59  56 20 105/67 (80) 100 Room Air  


 


2/13/21 09:00  54 30 112/81 (91) 97 Room Air  


 


2/13/21 08:00  66 19 111/77 (88) 100 Room Air  


 


2/13/21 08:00     99 Room Air  


 


2/13/21 07:00  41      


 


2/13/21 07:00  55 19 127/91 (103) 100 Room Air  


 


2/13/21 06:57 36.8       


 


2/13/21 06:00  45 28 110/69 (83) 99 Room Air  














I & O 


 


 2/14/21





 07:00


 


Intake Total 400 ml


 


Output Total 975 ml


 


Balance -575 ml








Height & Weight


Height: 5'7.50"


Weight: 365lbs. 0oz. 165.529524yc; 28.47 BMI


Method:Estimated


General Appearance:  No Apparent Distress, Chronically ill


HEENT:  PERRL/EOMI, Normal ENT Inspection, Pharynx Normal, Moist Mucous Memb

ranes


Neck:  Full Range of Motion, Normal Inspection, Non Tender


Respiratory:  Chest Non Tender, Lungs Clear, Normal Breath Sounds, No Accessory 

Muscle Use, No Respiratory Distress


Cardiovascular:  Regular Rate, Rhythm, No Edema, No Gallop, No JVD, No Murmur, 

Normal Peripheral Pulses


Capillary Refill:  Less Than 3 Seconds


Extremity:  Normal Capillary Refill, Normal Inspection, Normal Range of Motion, 

Non Tender, No Calf Tenderness, No Pedal Edema


Neurologic/Psychiatric:  Disoriented


Skin:  Normal Color, Warm/Dry


Lymphatic:  No Adenopathy





Results


Lab


Laboratory Tests


2/12/21 15:43








2/13/21 02:50








2/14/21 02:57











Assessment/Plan


Assessment/Plan


metabolic encephalopathy with hx of alcohol and drug use 


   -Pt is more alert today 


   -Montior


   -UDS is negative 


Hx of alcohol dependance and withdrawal 


   -Monitor 


Bradycardia 


   -Monitor 


   -Cardiology following 


Hypothyroid











MIGUEL VASQUEZ DO              Feb 14, 2021 05:29

## 2021-02-14 NOTE — PROGRESS NOTE - HOSPITALIST
Subjective


HPI/CC On Admission


Date Seen by Provider:  Feb 14, 2021


Time Seen by Provider:  11:15


CC: AMS





HPI: This is a 65yoAAM known to me from prior admits, both for severe 

encephalopathy, who has a diagnosis of alcoholism and mental illness who 

presented to the [facility] ER with confusion. Apparently he had not been taking 

his meds and it appears he has cycles of mental illness that occur every few 

months similar to this event. 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

appeared to be the dx and he could not run a TSH but TSH was essentially normal 

on check once he arrived at [facility]. Patient has become aggressive requiring 

anti-psychotics. Patient appears to have lost 150# since I last saw him.


Subjective/Events-last exam


Patient suspected to have etoh withdrawal now


CIWL protocol initiated


Patient does wake up and is able to talk to me for a bit


Reviewed labs


Kept ripping IV's out so changed abx IV to PO





Review of Systems


Neurological:  Confusion





Focused Exam


Lactate Level


2/12/21 16:30: Lactic Acid Level 1.25








Objective


Exam


Vital Signs





Vital Signs








  Date Time  Temp Pulse Resp B/P (MAP) Pulse Ox O2 Delivery O2 Flow Rate FiO2


 


2/14/21 15:00 37.0 81 20 111/72 (85) 97 Room Air  





Capillary Refill : Less Than 3 Seconds


General Appearance:  No Apparent Distress, WD/WN, Chronically ill


Respiratory:  Lungs Clear, Normal Breath Sounds


Cardiovascular:  Regular Rate, Rhythm


Neurologic/Psychiatric:  Alert, No Motor/Sensory Deficits, Disoriented





Results/Procedures


Lab


Laboratory Tests


2/14/21 02:57








Patient resulted labs reviewed.





Assessment/Plan


Assessment and Plan


Assess & Plan/Chief Complaint


Assessment:


AMS now presumed ETOH withdrawal


Mental illness


h/o ETOHism


HTN


Bradycardia-resolved


New infiltrate on CXR? Placed on Augmentin after lost IV's and DC Cefepime





Plan:


ICU care due to high risk for aggression


Bradycardia consulted Cardiology





2/14/21:


PO abx


Move to 4 th floor


MVI


ETOH withdrawal





Diagnosis/Problems


Diagnosis/Problems





(1) Acute metabolic encephalopathy


Status:  Acute


(2) Essential hypertension


Status:  Chronic











ABISAI CORTES DO                Feb 14, 2021 11:11

## 2021-02-15 VITALS — SYSTOLIC BLOOD PRESSURE: 123 MMHG | DIASTOLIC BLOOD PRESSURE: 68 MMHG

## 2021-02-15 VITALS — SYSTOLIC BLOOD PRESSURE: 135 MMHG | DIASTOLIC BLOOD PRESSURE: 82 MMHG

## 2021-02-15 VITALS — DIASTOLIC BLOOD PRESSURE: 74 MMHG | SYSTOLIC BLOOD PRESSURE: 140 MMHG

## 2021-02-15 VITALS — SYSTOLIC BLOOD PRESSURE: 120 MMHG | DIASTOLIC BLOOD PRESSURE: 80 MMHG

## 2021-02-15 VITALS — DIASTOLIC BLOOD PRESSURE: 68 MMHG | SYSTOLIC BLOOD PRESSURE: 123 MMHG

## 2021-02-15 VITALS — SYSTOLIC BLOOD PRESSURE: 125 MMHG | DIASTOLIC BLOOD PRESSURE: 77 MMHG

## 2021-02-15 LAB
ALBUMIN SERPL-MCNC: 3 GM/DL (ref 3.2–4.5)
ALBUMIN SERPL-MCNC: 3.1 GM/DL (ref 3.2–4.5)
ALP SERPL-CCNC: 40 U/L (ref 40–136)
ALP SERPL-CCNC: 41 U/L (ref 40–136)
ALT SERPL-CCNC: 6 U/L (ref 0–55)
ALT SERPL-CCNC: 7 U/L (ref 0–55)
AMMONIA PLAS-SCNC: 31 UMOL/L (ref 11–32)
BASOPHILS # BLD AUTO: 0 10^3/UL (ref 0–0.1)
BASOPHILS NFR BLD AUTO: 1 % (ref 0–10)
BILIRUB SERPL-MCNC: 0.3 MG/DL (ref 0.1–1)
BILIRUB SERPL-MCNC: 0.4 MG/DL (ref 0.1–1)
BUN/CREAT SERPL: 12
BUN/CREAT SERPL: 12
CALCIUM SERPL-MCNC: 8.8 MG/DL (ref 8.5–10.1)
CALCIUM SERPL-MCNC: 9.2 MG/DL (ref 8.5–10.1)
CHLORIDE SERPL-SCNC: 106 MMOL/L (ref 98–107)
CHLORIDE SERPL-SCNC: 106 MMOL/L (ref 98–107)
CO2 SERPL-SCNC: 27 MMOL/L (ref 21–32)
CO2 SERPL-SCNC: 30 MMOL/L (ref 21–32)
CREAT SERPL-MCNC: 0.95 MG/DL (ref 0.6–1.3)
CREAT SERPL-MCNC: 1.01 MG/DL (ref 0.6–1.3)
EOSINOPHIL # BLD AUTO: 0.2 10^3/UL (ref 0–0.3)
EOSINOPHIL NFR BLD AUTO: 3 % (ref 0–10)
GFR SERPLBLD BASED ON 1.73 SQ M-ARVRAT: > 60 ML/MIN
GFR SERPLBLD BASED ON 1.73 SQ M-ARVRAT: > 60 ML/MIN
GLUCOSE SERPL-MCNC: 95 MG/DL (ref 70–105)
GLUCOSE SERPL-MCNC: 98 MG/DL (ref 70–105)
HCT VFR BLD CALC: 39 % (ref 40–54)
HGB BLD-MCNC: 12.3 G/DL (ref 13.3–17.7)
LYMPHOCYTES # BLD AUTO: 1.7 10^3/UL (ref 1–4)
LYMPHOCYTES NFR BLD AUTO: 26 % (ref 12–44)
MANUAL DIFFERENTIAL PERFORMED BLD QL: NO
MCH RBC QN AUTO: 28 PG (ref 25–34)
MCHC RBC AUTO-ENTMCNC: 32 G/DL (ref 32–36)
MCV RBC AUTO: 89 FL (ref 80–99)
MONOCYTES # BLD AUTO: 0.8 10^3/UL (ref 0–1)
MONOCYTES NFR BLD AUTO: 13 % (ref 0–12)
NEUTROPHILS # BLD AUTO: 3.7 10^3/UL (ref 1.8–7.8)
NEUTROPHILS NFR BLD AUTO: 57 % (ref 42–75)
PLATELET # BLD: 149 10^3/UL (ref 130–400)
PMV BLD AUTO: 11.1 FL (ref 9–12.2)
POTASSIUM SERPL-SCNC: 3.7 MMOL/L (ref 3.6–5)
POTASSIUM SERPL-SCNC: 3.8 MMOL/L (ref 3.6–5)
PROT SERPL-MCNC: 6.6 GM/DL (ref 6.4–8.2)
PROT SERPL-MCNC: 6.6 GM/DL (ref 6.4–8.2)
SODIUM SERPL-SCNC: 143 MMOL/L (ref 135–145)
SODIUM SERPL-SCNC: 144 MMOL/L (ref 135–145)
WBC # BLD AUTO: 6.5 10^3/UL (ref 4.3–11)

## 2021-02-15 RX ADMIN — ACETAMINOPHEN PRN MG: 325 TABLET ORAL at 00:36

## 2021-02-15 RX ADMIN — LORAZEPAM PRN MG: 1 TABLET ORAL at 04:16

## 2021-02-15 RX ADMIN — METOPROLOL TARTRATE SCH MG: 25 TABLET, FILM COATED ORAL at 08:59

## 2021-02-15 RX ADMIN — QUETIAPINE FUMARATE SCH MG: 25 TABLET, FILM COATED ORAL at 09:00

## 2021-02-15 RX ADMIN — LORAZEPAM PRN MG: 1 TABLET ORAL at 05:33

## 2021-02-15 RX ADMIN — POTASSIUM CHLORIDE SCH MEQ: 1500 TABLET, EXTENDED RELEASE ORAL at 08:59

## 2021-02-15 RX ADMIN — QUETIAPINE FUMARATE SCH MG: 25 TABLET, FILM COATED ORAL at 21:38

## 2021-02-15 RX ADMIN — Medication SCH EA: at 04:16

## 2021-02-15 RX ADMIN — AMOXICILLIN AND CLAVULANATE POTASSIUM SCH MG: 875; 125 TABLET, FILM COATED ORAL at 08:59

## 2021-02-15 RX ADMIN — POTASSIUM CHLORIDE SCH MEQ: 1500 TABLET, EXTENDED RELEASE ORAL at 18:43

## 2021-02-15 RX ADMIN — LEVOTHYROXINE SODIUM SCH MCG: 100 TABLET ORAL at 04:16

## 2021-02-15 RX ADMIN — METOPROLOL TARTRATE SCH MG: 25 TABLET, FILM COATED ORAL at 21:38

## 2021-02-15 RX ADMIN — LORAZEPAM PRN MG: 1 TABLET ORAL at 18:43

## 2021-02-15 RX ADMIN — FAMOTIDINE SCH MG: 20 TABLET, FILM COATED ORAL at 21:37

## 2021-02-15 RX ADMIN — LORAZEPAM PRN MG: 1 TABLET ORAL at 00:36

## 2021-02-15 RX ADMIN — PANTOPRAZOLE SODIUM SCH MG: 40 TABLET, DELAYED RELEASE ORAL at 08:59

## 2021-02-15 RX ADMIN — HYDROCHLOROTHIAZIDE SCH MG: 25 TABLET ORAL at 09:00

## 2021-02-15 RX ADMIN — AMOXICILLIN AND CLAVULANATE POTASSIUM SCH MG: 875; 125 TABLET, FILM COATED ORAL at 18:43

## 2021-02-15 RX ADMIN — FAMOTIDINE SCH MG: 20 TABLET, FILM COATED ORAL at 08:59

## 2021-02-15 NOTE — PROGRESS NOTE - CARDIOLOGY
Cardiology SOAP Progress Note


Subjective:


Sitting up in bed


States he feels ok, but he can't eat because he can't chew food


No c/o CP, SOB





Objective:


I&O/Vital Signs











 2/16/21 2/16/21





 00:00 08:00


 


Temp 36.2 36.2


 


Pulse 48 58


 


Resp 18 18


 


B/P (MAP) 117/63 (81) 119/58 (78)


 


Pulse Ox 91 99


 


O2 Delivery Room Air Room Air














 2/16/21





 00:00


 


Intake Total 680 ml


 


Output Total 550 ml


 


Balance 130 ml








Weight (Pounds):  365


Weight (Ounces):  0


Weight (Calculated Kilograms):  165.677875


Constitutional:  other (awake, alert, oriented to self and place)


Respiratory:  No accessory muscle use; other (fair to good, bilatral air entry)


Cardiovascular:  regular rate-rhythm, S1 and S2, systolic murmur (soft MENDY at 

card base)


Gastrointestional:  No tender, No soft, No guarding, No rebound; audible bowel 

sounds


Extremities:  No clubbing, No cyanosis, No significant edema


Neurologic/Psychiatric:  grossly intact (moves all extremities; conversation 

appropriate)


Skin:  No rash on exposed areas, No ulcerations on exposed areas





Results/Procedures:


Labs


Laboratory Tests


2/15/21 12:10: 


Sodium Level 143, Potassium Level 3.8, Chloride Level 106, Carbon Dioxide Level 

30, Anion Gap 7, Blood Urea Nitrogen 11, Creatinine 0.95, Estimat Glomerular 

Filtration Rate > 60, BUN/Creatinine Ratio 12, Glucose Level 95, Calcium Level 

9.2, Corrected Calcium 10.0, Total Bilirubin 0.3, Aspartate Amino Transf 

(AST/SGOT) 15, Alanine Aminotransferase (ALT/SGPT) 6, Alkaline Phosphatase 41, 

Ammonia 31, Total Protein 6.6, Albumin 3.0L


2/16/21 05:13: 


Sodium Level 142, Potassium Level 4.6, Chloride Level 104, Carbon Dioxide Level 

27, Anion Gap 11, Blood Urea Nitrogen 10, Creatinine 0.88, Estimat Glomerular 

Filtration Rate > 60, BUN/Creatinine Ratio 11, Glucose Level 88, Calcium Level 

9.4, White Blood Count 7.3, Red Blood Count 4.76, Hemoglobin 13.5, Hematocrit 

42, Mean Corpuscular Volume 88, Mean Corpuscular Hemoglobin 28, Mean Corpuscular

Hemoglobin Concent 32, Red Cell Distribution Width 15.9H, Platelet Count 182, 

Mean Platelet Volume 11.1, Immature Granulocyte % (Auto) 0, Neutrophils (%) 

(Auto) 56, Lymphocytes (%) (Auto) 27, Monocytes (%) (Auto) 12, Eosinophils (%) 

(Auto) 4, Basophils (%) (Auto) 1, Neutrophils # (Auto) 4.0, Lymphocytes # (Auto)

2.0, Monocytes # (Auto) 0.9, Eosinophils # (Auto) 0.3, Basophils # (Auto) 0.1, 

Immature Granulocyte # (Auto) 0.0





Microbiology


2/12/21 Urine Culture - Final, Complete


          Gram Pos Mixed Bacterial Frances


2/12/21 Blood Culture - Preliminary, Resulted


          No growth








A/P:


Assessment:





Unresponsiveness / mental status change, etiology undetermined, managed by Dr Damon - improved





Intermittent sinus bradycardia w/o any hemodynamic compromise





H/o hypothyroidism


- low TSH on 2/12/21, indicative of iatrogenic hyperthyroidism


Plan:





* Mental status improving


* Continue tele


* Monitor and correct labs











YOHANA FLORENTINO           Feb 15, 2021 10:30

## 2021-02-15 NOTE — PROGRESS NOTE - CARDIOLOGY
Cardiology SOAP Progress Note


Subjective:


Answers no to question regarding cp or shortness of breath or palp, but is 

confused and responses are not reliable





Objective:


I&O/Vital Signs











 2/15/21 2/15/21 2/15/21 2/15/21





 00:37 04:00 05:14 08:00


 


Temp 36.3 36.2 36.2 35.8


 


Pulse 54 50 50 56


 


Resp 16 17 17 14


 


B/P (MAP) 125/77 (93) 123/68 (86) 123/68 (86) 140/74 (96)


 


Pulse Ox 100 99 99 99


 


O2 Delivery Room Air Room Air Room Air Room Air














 2/15/21





 00:00


 


Intake Total 300 ml


 


Output Total 150 ml


 


Balance 150 ml








Weight (Pounds):  365


Weight (Ounces):  0


Weight (Calculated Kilograms):  165.374348


Constitutional:  No AAO x 3; other (awake, alert, oriented to self and place)


Respiratory:  No accessory muscle use; other (fair to good, bilatral air entry)


Cardiovascular:  regular rate-rhythm, S1 and S2, systolic murmur (soft MENDY at 

card base)


Gastrointestional:  No tender, No soft, No guarding, No rebound; audible bowel 

sounds


Extremities:  No clubbing, No cyanosis, No significant edema


Neurologic/Psychiatric:  other (moves all extremities; appears confused in his 

responses to questions)


Skin:  No rash on exposed areas, No ulcerations on exposed areas





Results/Procedures:


Labs


Laboratory Tests


2/15/21 04:45: 


White Blood Count 6.5, Red Blood Count 4.33, Hemoglobin 12.3L, Hematocrit 39L, 

Mean Corpuscular Volume 89, Mean Corpuscular Hemoglobin 28, Mean Corpuscular 

Hemoglobin Concent 32, Red Cell Distribution Width 16.1H, Platelet Count 149, 

Mean Platelet Volume 11.1, Immature Granulocyte % (Auto) 1, Neutrophils (%) 

(Auto) 57, Lymphocytes (%) (Auto) 26, Monocytes (%) (Auto) 13H, Eosinophils (%) 

(Auto) 3, Basophils (%) (Auto) 1, Neutrophils # (Auto) 3.7, Lymphocytes # (Auto)

1.7, Monocytes # (Auto) 0.8, Eosinophils # (Auto) 0.2, Basophils # (Auto) 0.0, 

Immature Granulocyte # (Auto) 0.0, Sodium Level 144, Potassium Level 3.7, 

Chloride Level 106, Carbon Dioxide Level 27, Anion Gap 11, Blood Urea Nitrogen 

12, Creatinine 1.01, Estimat Glomerular Filtration Rate > 60, BUN/Creatinine 

Ratio 12, Glucose Level 98, Calcium Level 8.8, Corrected Calcium 9.5, Total 

Bilirubin 0.4, Aspartate Amino Transf (AST/SGOT) 12, Alanine Aminotransferase 

(ALT/SGPT) 7, Alkaline Phosphatase 40, Total Protein 6.6, Albumin 3.1L





Microbiology


2/12/21 Urine Culture - Final, Complete


          Gram Pos Mixed Bacterial Frances


2/12/21 Blood Culture - Preliminary, Resulted


          No growth





Laboratory Tests


2/14/21 02:57








2/15/21 04:45











A/P:


Assessment:





Mental status change, etiology undetermined, managed by Dr Damon / Dr Hollingsworth - 

improved





Intermittent sinus bradycardia w/o any hemodynamic compromise





H/o hypothyroidism


- low TSH on 2/12/21, indicative of iatrogenic hyperthyroidism


Plan:





* Cardiac status appears to be clinically stable


* Mental status improving


* Continue tele


* Monitor and correct labs











RAY VALLE MD FACP FAC CCDS   Feb 15, 2021 11:08

## 2021-02-15 NOTE — PROGRESS NOTE
Subjective


Subjective/Events-last exam


Altered this AM. Having a hard time eating breakfast due to confusion. No 

coughing with eating but needs guidance.


Review of Systems


Pulmonary:  No Dyspnea, No Cough


Cardiovascular:  No: Chest Pain


Gastrointestinal:  Abdominal Pain; No: Nausea, Vomiting


Neurological:  Weakness, Incoordination





Focused Exam


Lactate Level


2/12/21 16:30: Lactic Acid Level 1.25





Objective


Exam


Last Set of Vital Signs





Vital Signs








  Date Time  Temp Pulse Resp B/P (MAP) Pulse Ox O2 Delivery O2 Flow Rate FiO2


 


2/15/21 08:00 35.8 56 14 140/74 (96) 99 Room Air  





Capillary Refill : Less Than 3 Seconds


I&O











Intake and Output 


 


 2/15/21





 00:00


 


Intake Total 710 ml


 


Output Total 300 ml


 


Balance 410 ml


 


 


 


Intake Oral 710 ml


 


Output Urine Total 300 ml








General:  Alert, Other (Oriented x0)


HEENT:  Mucous Memb Moist/Pink


Lungs:  Clear to Auscultation, Normal Air Movement


Heart:  No Murmurs, Other (bradycardic rate)


Abdomen:  Normal Bowel Sounds, Soft, No Tenderness, No Masses


Extremities:  No Edema, No Tenderness/Swelling


Skin:  No Rashes, No Breakdown


Neuro:  Sensation Intact, Cranial Nerves 3-12 NL





Results/Procedures


Lab


Laboratory Tests


2/15/21 04:45: 


White Blood Count 6.5, Red Blood Count 4.33, Hemoglobin 12.3L, Hematocrit 39L, 

Mean Corpuscular Volume 89, Mean Corpuscular Hemoglobin 28, Mean Corpuscular 

Hemoglobin Concent 32, Red Cell Distribution Width 16.1H, Platelet Count 149, 

Mean Platelet Volume 11.1, Immature Granulocyte % (Auto) 1, Neutrophils (%) 

(Auto) 57, Lymphocytes (%) (Auto) 26, Monocytes (%) (Auto) 13H, Eosinophils (%) 

(Auto) 3, Basophils (%) (Auto) 1, Neutrophils # (Auto) 3.7, Lymphocytes # (Auto)

1.7, Monocytes # (Auto) 0.8, Eosinophils # (Auto) 0.2, Basophils # (Auto) 0.0, 

Immature Granulocyte # (Auto) 0.0, Sodium Level 144, Potassium Level 3.7, 

Chloride Level 106, Carbon Dioxide Level 27, Anion Gap 11, Blood Urea Nitrogen 

12, Creatinine 1.01, Estimat Glomerular Filtration Rate > 60, BUN/Creatinine 

Ratio 12, Glucose Level 98, Calcium Level 8.8, Corrected Calcium 9.5, Total 

Bilirubin 0.4, Aspartate Amino Transf (AST/SGOT) 12, Alanine Aminotransferase 

(ALT/SGPT) 7, Alkaline Phosphatase 40, Total Protein 6.6, Albumin 3.1L


2/15/21 12:10: 


Sodium Level 143, Potassium Level 3.8, Chloride Level 106, Carbon Dioxide Level 

30, Anion Gap 7, Blood Urea Nitrogen 11, Creatinine 0.95, Estimat Glomerular Fi

ltration Rate > 60, BUN/Creatinine Ratio 12, Glucose Level 95, Calcium Level 

9.2, Corrected Calcium 10.0, Total Bilirubin 0.3, Aspartate Amino Transf 

(AST/SGOT) 15, Alanine Aminotransferase (ALT/SGPT) 6, Alkaline Phosphatase 41, 

Total Protein 6.6, Albumin 3.0L, Ammonia 31





Microbiology


2/12/21 Urine Culture - Final, Complete


          Gram Pos Mixed Bacterial Frances


2/12/21 Blood Culture - Preliminary, Resulted


          No growth





Assessment/Plan


Assessment/Plan





(1) Altered mental status


Status:  Acute


Assessment & Plan:  2/15: ? infection with new infiltrate on antibiotics vs EtOH

intoxication or delirium, ammonia elevated on admission, will repeat level, 

start thiamine and folic acid


Qualifiers:  


   Qualified Codes:  R41.0 - Disorientation, unspecified


(2) Acute metabolic encephalopathy


Status:  Acute


(3) Alcoholism


Status:  Chronic


(4) Bradycardia


Status:  Acute


Assessment & Plan:  2/15: Cardiology consulted, appreciate recommendations





(5) Hypertension


Status:  Chronic


Assessment & Plan:  2/15: Normotensive at this time, continue to monitor


Qualifiers:  


   Qualified Codes:  I10 - Essential (primary) hypertension


(6) DVT prophylaxis


Status:  Acute


Assessment & Plan:  - KIARA Collins MD               Feb 15, 2021 16:09

## 2021-02-16 VITALS — SYSTOLIC BLOOD PRESSURE: 105 MMHG | DIASTOLIC BLOOD PRESSURE: 58 MMHG

## 2021-02-16 VITALS — SYSTOLIC BLOOD PRESSURE: 119 MMHG | DIASTOLIC BLOOD PRESSURE: 58 MMHG

## 2021-02-16 VITALS — SYSTOLIC BLOOD PRESSURE: 119 MMHG | DIASTOLIC BLOOD PRESSURE: 65 MMHG

## 2021-02-16 VITALS — DIASTOLIC BLOOD PRESSURE: 63 MMHG | SYSTOLIC BLOOD PRESSURE: 117 MMHG

## 2021-02-16 LAB
BASOPHILS # BLD AUTO: 0.1 10^3/UL (ref 0–0.1)
BASOPHILS NFR BLD AUTO: 1 % (ref 0–10)
BUN/CREAT SERPL: 11
CALCIUM SERPL-MCNC: 9.4 MG/DL (ref 8.5–10.1)
CHLORIDE SERPL-SCNC: 104 MMOL/L (ref 98–107)
CO2 SERPL-SCNC: 27 MMOL/L (ref 21–32)
CREAT SERPL-MCNC: 0.88 MG/DL (ref 0.6–1.3)
EOSINOPHIL # BLD AUTO: 0.3 10^3/UL (ref 0–0.3)
EOSINOPHIL NFR BLD AUTO: 4 % (ref 0–10)
GFR SERPLBLD BASED ON 1.73 SQ M-ARVRAT: > 60 ML/MIN
GLUCOSE SERPL-MCNC: 88 MG/DL (ref 70–105)
HCT VFR BLD CALC: 42 % (ref 40–54)
HGB BLD-MCNC: 13.5 G/DL (ref 13.3–17.7)
LYMPHOCYTES # BLD AUTO: 2 10^3/UL (ref 1–4)
LYMPHOCYTES NFR BLD AUTO: 27 % (ref 12–44)
MANUAL DIFFERENTIAL PERFORMED BLD QL: NO
MCH RBC QN AUTO: 28 PG (ref 25–34)
MCHC RBC AUTO-ENTMCNC: 32 G/DL (ref 32–36)
MCV RBC AUTO: 88 FL (ref 80–99)
MONOCYTES # BLD AUTO: 0.9 10^3/UL (ref 0–1)
MONOCYTES NFR BLD AUTO: 12 % (ref 0–12)
NEUTROPHILS # BLD AUTO: 4 10^3/UL (ref 1.8–7.8)
NEUTROPHILS NFR BLD AUTO: 56 % (ref 42–75)
PLATELET # BLD: 182 10^3/UL (ref 130–400)
PMV BLD AUTO: 11.1 FL (ref 9–12.2)
POTASSIUM SERPL-SCNC: 4.6 MMOL/L (ref 3.6–5)
SODIUM SERPL-SCNC: 142 MMOL/L (ref 135–145)
WBC # BLD AUTO: 7.3 10^3/UL (ref 4.3–11)

## 2021-02-16 RX ADMIN — POTASSIUM CHLORIDE SCH MEQ: 1500 TABLET, EXTENDED RELEASE ORAL at 08:50

## 2021-02-16 RX ADMIN — QUETIAPINE FUMARATE SCH MG: 25 TABLET, FILM COATED ORAL at 08:50

## 2021-02-16 RX ADMIN — AMOXICILLIN AND CLAVULANATE POTASSIUM SCH MG: 875; 125 TABLET, FILM COATED ORAL at 08:50

## 2021-02-16 RX ADMIN — QUETIAPINE FUMARATE SCH MG: 25 TABLET, FILM COATED ORAL at 20:38

## 2021-02-16 RX ADMIN — LEVOTHYROXINE SODIUM SCH MCG: 100 TABLET ORAL at 06:19

## 2021-02-16 RX ADMIN — PANTOPRAZOLE SODIUM SCH MG: 40 TABLET, DELAYED RELEASE ORAL at 08:50

## 2021-02-16 RX ADMIN — METOPROLOL TARTRATE SCH MG: 25 TABLET, FILM COATED ORAL at 20:38

## 2021-02-16 RX ADMIN — Medication SCH MG: at 06:19

## 2021-02-16 RX ADMIN — FOLIC ACID SCH MG: 1 TABLET ORAL at 08:50

## 2021-02-16 RX ADMIN — HYDROCHLOROTHIAZIDE SCH MG: 25 TABLET ORAL at 08:51

## 2021-02-16 RX ADMIN — AMOXICILLIN AND CLAVULANATE POTASSIUM SCH MG: 875; 125 TABLET, FILM COATED ORAL at 18:18

## 2021-02-16 RX ADMIN — FAMOTIDINE SCH MG: 20 TABLET, FILM COATED ORAL at 08:50

## 2021-02-16 RX ADMIN — Medication SCH EA: at 06:19

## 2021-02-16 RX ADMIN — POTASSIUM CHLORIDE SCH MEQ: 1500 TABLET, EXTENDED RELEASE ORAL at 18:18

## 2021-02-16 RX ADMIN — METOPROLOL TARTRATE SCH MG: 25 TABLET, FILM COATED ORAL at 08:50

## 2021-02-16 RX ADMIN — FAMOTIDINE SCH MG: 20 TABLET, FILM COATED ORAL at 20:38

## 2021-02-16 NOTE — PROGRESS NOTE
Subjective


Subjective/Events-last exam


Patient pleasantly confused. No acute concerns. Needing help eating. Denies any 

pain.


Review of Systems


General:  Malaise


Cardiovascular:  No: Chest Pain, Palpitations, Edema


Gastrointestinal:  No: Nausea, Vomiting, Abdominal Pain, Diarrhea, Constipation


Musculoskeletal:  No: neck pain, shoulder pain


Neurological:  Weakness, Incoordination, Confusion





Objective


Exam


Last Set of Vital Signs





Vital Signs








  Date Time  Temp Pulse Resp B/P (MAP) Pulse Ox O2 Delivery O2 Flow Rate FiO2


 


2/16/21 15:56 36.3 82 19 119/65 (83) 95 Room Air  





Capillary Refill : Less Than 3 Seconds


I&O











Intake and Output 


 


 2/16/21





 00:00


 


Intake Total 880 ml


 


Output Total 650 ml


 


Balance 230 ml


 


 


 


Intake Oral 880 ml


 


Output Urine Total 650 ml








General:  Other (Awake, Oriented x 0)


Lungs:  Clear to Auscultation, Normal Air Movement


Heart:  Regular Rate, No Murmurs


Abdomen:  Normal Bowel Sounds, Soft, No Tenderness, No Masses


Extremities:  No Edema, No Tenderness/Swelling


Skin:  No Rashes, No Breakdown





Results/Procedures


Lab


Laboratory Tests


2/16/21 05:13: 


White Blood Count 7.3, Red Blood Count 4.76, Hemoglobin 13.5, Hematocrit 42, 

Mean Corpuscular Volume 88, Mean Corpuscular Hemoglobin 28, Mean Corpuscular Hem

oglobin Concent 32, Red Cell Distribution Width 15.9H, Platelet Count 182, Mean 

Platelet Volume 11.1, Immature Granulocyte % (Auto) 0, Neutrophils (%) (Auto) 

56, Lymphocytes (%) (Auto) 27, Monocytes (%) (Auto) 12, Eosinophils (%) (Auto) 

4, Basophils (%) (Auto) 1, Neutrophils # (Auto) 4.0, Lymphocytes # (Auto) 2.0, 

Monocytes # (Auto) 0.9, Eosinophils # (Auto) 0.3, Basophils # (Auto) 0.1, 

Immature Granulocyte # (Auto) 0.0, Sodium Level 142, Potassium Level 4.6, 

Chloride Level 104, Carbon Dioxide Level 27, Anion Gap 11, Blood Urea Nitrogen 

10, Creatinine 0.88, Estimat Glomerular Filtration Rate > 60, BUN/Creatinine 

Ratio 11, Glucose Level 88, Calcium Level 9.4





Microbiology


2/12/21 Urine Culture - Final, Complete


          Gram Pos Mixed Bacterial Frances


2/12/21 Blood Culture - Preliminary, Resulted


          No growth





Assessment/Plan


Assessment/Plan





(1) Altered mental status


Status:  Acute


Assessment & Plan:  2/15: ? infection with new infiltrate on antibiotics vs EtOH

intoxication or delirium, ammonia elevated on admission, will repeat level, 

start thiamine and folic acid


2/16: Concerned for Wernicke due to long term EtOH use, continue thiamine and 

folic acid, ammonia normalized, no signs of acute infection, continue to 

monitor, patient will need NH placement due to concerns for safety


Qualifiers:  


   Qualified Codes:  R41.0 - Disorientation, unspecified


(2) Acute metabolic encephalopathy


Status:  Acute


(3) Alcoholism


Status:  Chronic


(4) Bradycardia


Status:  Acute


Assessment & Plan:  2/15: Cardiology consulted, appreciate recommendations





(5) Hypertension


Status:  Chronic


Assessment & Plan:  2/15: Normotensive at this time, continue to monitor


Qualifiers:  


   Qualified Codes:  I10 - Essential (primary) hypertension


(6) DVT prophylaxis


Status:  Acute


Assessment & Plan:  - KIARA Collins MD               Feb 16, 2021 17:22

## 2021-02-16 NOTE — PROGRESS NOTE - CARDIOLOGY
Cardiology SOAP Progress Note


Subjective:


Agitated and confused and unable to answer any questions





Objective:


I&O/Vital Signs











 2/16/21





 08:00


 


Temp 36.2


 


Pulse 58


 


Resp 18


 


B/P (MAP) 119/58 (78)


 


Pulse Ox 99


 


O2 Delivery Room Air














 2/15/21





 23:59


 


Intake Total 680 ml


 


Output Total 550 ml


 


Balance 130 ml








Weight (Pounds):  365


Weight (Ounces):  0


Weight (Calculated Kilograms):  165.266009


Constitutional:  No AAO x 3; other (agitated and confused)


Respiratory:  No accessory muscle use; other (fair to good, bilatral air entry)


Cardiovascular:  regular rate-rhythm, S1 and S2, systolic murmur (soft MENDY at 

card base)


Gastrointestional:  No tender, No soft, No guarding, No rebound; audible bowel 

sounds


Extremities:  No clubbing, No cyanosis, No significant edema


Neurologic/Psychiatric:  other (moves all extremities; agitated and confused)


Skin:  No rash on exposed areas, No ulcerations on exposed areas





Results/Procedures:


Labs


Laboratory Tests


2/16/21 05:13: 


White Blood Count 7.3, Red Blood Count 4.76, Hemoglobin 13.5, Hematocrit 42, 

Mean Corpuscular Volume 88, Mean Corpuscular Hemoglobin 28, Mean Corpuscular 

Hemoglobin Concent 32, Red Cell Distribution Width 15.9H, Platelet Count 182, 

Mean Platelet Volume 11.1, Immature Granulocyte % (Auto) 0, Neutrophils (%) 

(Auto) 56, Lymphocytes (%) (Auto) 27, Monocytes (%) (Auto) 12, Eosinophils (%) 

(Auto) 4, Basophils (%) (Auto) 1, Neutrophils # (Auto) 4.0, Lymphocytes # (Auto)

2.0, Monocytes # (Auto) 0.9, Eosinophils # (Auto) 0.3, Basophils # (Auto) 0.1, 

Immature Granulocyte # (Auto) 0.0, Sodium Level 142, Potassium Level 4.6, 

Chloride Level 104, Carbon Dioxide Level 27, Anion Gap 11, Blood Urea Nitrogen 

10, Creatinine 0.88, Estimat Glomerular Filtration Rate > 60, BUN/Creatinine 

Ratio 11, Glucose Level 88, Calcium Level 9.4





Microbiology


2/12/21 Urine Culture - Final, Complete


          Gram Pos Mixed Bacterial Francse


2/12/21 Blood Culture - Preliminary, Resulted


          No growth








A/P:


Assessment:





Mental status change, etiology undetermined, managed by Dr Damon / Dr Hollingsworth - 

worse compared to 2/15/21





Intermittent sinus bradycardia w/o any hemodynamic compromise





H/o hypothyroidism


- low TSH on 2/12/21, indicative of iatrogenic hyperthyroidism


Plan:





* Mental status appears to have taken a turn for the worse. Medical svce 

  managing


* Monitor and correct labs











RAY VALLE MD FACP FAC CCDS   Feb 16, 2021 15:06

## 2021-02-16 NOTE — PROGRESS NOTE - CARDIOLOGY
Cardiology SOAP Progress Note


Subjective:


Lying in bed


Very confused this morning


Thinks he is at work


Multiple attempts per staff to re-orient





Objective:


I&O/Vital Signs











 2/17/21 2/17/21





 00:15 08:00


 


Temp 36.4 35.4


 


Pulse 64 63


 


Resp 18 18


 


B/P (MAP) 136/62 (86) 141/65 (90)


 


Pulse Ox 98 96


 


O2 Delivery Room Air Room Air














 2/17/21





 00:00


 


Intake Total 2040 ml


 


Output Total 1450 ml


 


Balance 590 ml








Weight (Pounds):  365


Weight (Ounces):  0


Weight (Calculated Kilograms):  165.725850


Constitutional:  No AAO x 3; other (awake, alert, oriented to self only)


Respiratory:  No accessory muscle use; other (fair to good, bilatral air entry)


Cardiovascular:  regular rate-rhythm, S1 and S2, systolic murmur (soft MENDY at 

card base)


Gastrointestional:  No tender, No soft, No guarding, No rebound; audible bowel 

sounds


Extremities:  No clubbing, No cyanosis, No significant edema


Neurologic/Psychiatric:  other (moves all extremities; appears confused in his 

responses to questions)


Skin:  No rash on exposed areas, No ulcerations on exposed areas





Results/Procedures:


Labs


Laboratory Tests


2/17/21 06:05: 


White Blood Count 8.9, Red Blood Count 4.65, Hemoglobin 13.2L, Hematocrit 41, 

Mean Corpuscular Volume 87, Mean Corpuscular Hemoglobin 28, Mean Corpuscular 

Hemoglobin Concent 33, Red Cell Distribution Width 15.8H, Platelet Count 181, 

Mean Platelet Volume 11.0, Immature Granulocyte % (Auto) 0, Neutrophils (%) 

(Auto) 67, Lymphocytes (%) (Auto) 18, Monocytes (%) (Auto) 11, Eosinophils (%) 

(Auto) 4, Basophils (%) (Auto) 1, Neutrophils # (Auto) 6.0, Lymphocytes # (Auto)

1.6, Monocytes # (Auto) 0.9, Eosinophils # (Auto) 0.4H, Basophils # (Auto) 0.1, 

Immature Granulocyte # (Auto) 0.0, Sodium Level 139, Potassium Level 4.1, 

Chloride Level 104, Carbon Dioxide Level 26, Anion Gap 9, Blood Urea Nitrogen 9,

Creatinine 0.96, Estimat Glomerular Filtration Rate > 60, BUN/Creatinine Ratio 

9, Glucose Level 101, Calcium Level 9.1





Microbiology


2/12/21 Urine Culture - Final, Complete


          Gram Pos Mixed Bacterial Frances


2/12/21 Blood Culture - Preliminary, Resulted


          No growth








A/P:


Assessment:





Mental status change, etiology undetermined, managed by Dr Damon / Dr Hollingsworth - 

improved





Intermittent sinus bradycardia w/o any hemodynamic compromise





H/o hypothyroidism


- low TSH on 2/12/21, indicative of iatrogenic hyperthyroidism


Plan:





* Cardiac status appears to be clinically stable


* Mental status improving, but remains confused


* Continue tele


* Monitor and correct labs











YOHANA FLORENTINO           Feb 16, 2021 10:15

## 2021-02-17 VITALS — DIASTOLIC BLOOD PRESSURE: 62 MMHG | SYSTOLIC BLOOD PRESSURE: 136 MMHG

## 2021-02-17 VITALS — DIASTOLIC BLOOD PRESSURE: 65 MMHG | SYSTOLIC BLOOD PRESSURE: 141 MMHG

## 2021-02-17 VITALS — SYSTOLIC BLOOD PRESSURE: 123 MMHG | DIASTOLIC BLOOD PRESSURE: 87 MMHG

## 2021-02-17 VITALS — DIASTOLIC BLOOD PRESSURE: 87 MMHG | SYSTOLIC BLOOD PRESSURE: 123 MMHG

## 2021-02-17 LAB
BASOPHILS # BLD AUTO: 0.1 10^3/UL (ref 0–0.1)
BASOPHILS NFR BLD AUTO: 1 % (ref 0–10)
BUN/CREAT SERPL: 9
CALCIUM SERPL-MCNC: 9.1 MG/DL (ref 8.5–10.1)
CHLORIDE SERPL-SCNC: 104 MMOL/L (ref 98–107)
CO2 SERPL-SCNC: 26 MMOL/L (ref 21–32)
CREAT SERPL-MCNC: 0.96 MG/DL (ref 0.6–1.3)
EOSINOPHIL # BLD AUTO: 0.4 10^3/UL (ref 0–0.3)
EOSINOPHIL NFR BLD AUTO: 4 % (ref 0–10)
GFR SERPLBLD BASED ON 1.73 SQ M-ARVRAT: > 60 ML/MIN
GLUCOSE SERPL-MCNC: 101 MG/DL (ref 70–105)
HCT VFR BLD CALC: 41 % (ref 40–54)
HGB BLD-MCNC: 13.2 G/DL (ref 13.3–17.7)
LYMPHOCYTES # BLD AUTO: 1.6 10^3/UL (ref 1–4)
LYMPHOCYTES NFR BLD AUTO: 18 % (ref 12–44)
MANUAL DIFFERENTIAL PERFORMED BLD QL: NO
MCH RBC QN AUTO: 28 PG (ref 25–34)
MCHC RBC AUTO-ENTMCNC: 33 G/DL (ref 32–36)
MCV RBC AUTO: 87 FL (ref 80–99)
MONOCYTES # BLD AUTO: 0.9 10^3/UL (ref 0–1)
MONOCYTES NFR BLD AUTO: 11 % (ref 0–12)
NEUTROPHILS # BLD AUTO: 6 10^3/UL (ref 1.8–7.8)
NEUTROPHILS NFR BLD AUTO: 67 % (ref 42–75)
PLATELET # BLD: 181 10^3/UL (ref 130–400)
PMV BLD AUTO: 11 FL (ref 9–12.2)
POTASSIUM SERPL-SCNC: 4.1 MMOL/L (ref 3.6–5)
SODIUM SERPL-SCNC: 139 MMOL/L (ref 135–145)
WBC # BLD AUTO: 8.9 10^3/UL (ref 4.3–11)

## 2021-02-17 RX ADMIN — HYDROCHLOROTHIAZIDE SCH MG: 25 TABLET ORAL at 07:52

## 2021-02-17 RX ADMIN — LORAZEPAM PRN MG: 1 TABLET ORAL at 06:37

## 2021-02-17 RX ADMIN — ACETAMINOPHEN PRN MG: 325 TABLET ORAL at 11:52

## 2021-02-17 RX ADMIN — PANTOPRAZOLE SODIUM SCH MG: 40 TABLET, DELAYED RELEASE ORAL at 07:52

## 2021-02-17 RX ADMIN — LORAZEPAM PRN MG: 1 TABLET ORAL at 15:55

## 2021-02-17 RX ADMIN — POTASSIUM CHLORIDE SCH MEQ: 1500 TABLET, EXTENDED RELEASE ORAL at 07:52

## 2021-02-17 RX ADMIN — LEVOTHYROXINE SODIUM SCH MCG: 100 TABLET ORAL at 06:14

## 2021-02-17 RX ADMIN — QUETIAPINE FUMARATE SCH MG: 25 TABLET, FILM COATED ORAL at 07:52

## 2021-02-17 RX ADMIN — Medication SCH EA: at 06:14

## 2021-02-17 RX ADMIN — AMOXICILLIN AND CLAVULANATE POTASSIUM SCH MG: 875; 125 TABLET, FILM COATED ORAL at 07:52

## 2021-02-17 RX ADMIN — Medication SCH MG: at 06:14

## 2021-02-17 RX ADMIN — FOLIC ACID SCH MG: 1 TABLET ORAL at 07:52

## 2021-02-17 RX ADMIN — METOPROLOL TARTRATE SCH MG: 25 TABLET, FILM COATED ORAL at 07:52

## 2021-02-17 RX ADMIN — FAMOTIDINE SCH MG: 20 TABLET, FILM COATED ORAL at 07:52

## 2021-02-17 NOTE — PROGRESS NOTE - CARDIOLOGY
Cardiology SOAP Progress Note


Subjective:


Agitated and confused. Not able to answer any questions





Objective:


I&O/Vital Signs











 2/17/21 2/17/21 2/17/21





 08:00 08:00 11:41


 


Temp  35.4 


 


Pulse  63 


 


Resp  18 


 


B/P (MAP)  141/65 (90) 


 


Pulse Ox  96 


 


O2 Delivery Room Air Room Air Room Air














 2/17/21





 00:00


 


Intake Total 2040 ml


 


Output Total 1450 ml


 


Balance 590 ml








Weight (Pounds):  365


Weight (Ounces):  0


Weight (Calculated Kilograms):  165.056230


Constitutional:  No AAO x 3; other (agitated and confused)


Respiratory:  No accessory muscle use; other (fair to good, bilatral air entry)


Cardiovascular:  regular rate-rhythm, S1 and S2, systolic murmur (soft MENDY at 

card base)


Gastrointestional:  No tender, No soft, No guarding, No rebound; audible bowel 

sounds


Extremities:  No clubbing, No cyanosis, No significant edema


Neurologic/Psychiatric:  other (moves all extremities; agitated and confused)


Skin:  No rash on exposed areas, No ulcerations on exposed areas





Results/Procedures:


Labs


Laboratory Tests


2/17/21 06:05: 


White Blood Count 8.9, Red Blood Count 4.65, Hemoglobin 13.2L, Hematocrit 41, 

Mean Corpuscular Volume 87, Mean Corpuscular Hemoglobin 28, Mean Corpuscular 

Hemoglobin Concent 33, Red Cell Distribution Width 15.8H, Platelet Count 181, 

Mean Platelet Volume 11.0, Immature Granulocyte % (Auto) 0, Neutrophils (%) 

(Auto) 67, Lymphocytes (%) (Auto) 18, Monocytes (%) (Auto) 11, Eosinophils (%) 

(Auto) 4, Basophils (%) (Auto) 1, Neutrophils # (Auto) 6.0, Lymphocytes # (Auto)

1.6, Monocytes # (Auto) 0.9, Eosinophils # (Auto) 0.4H, Basophils # (Auto) 0.1, 

Immature Granulocyte # (Auto) 0.0, Sodium Level 139, Potassium Level 4.1, 

Chloride Level 104, Carbon Dioxide Level 26, Anion Gap 9, Blood Urea Nitrogen 9,

Creatinine 0.96, Estimat Glomerular Filtration Rate > 60, BUN/Creatinine Ratio 

9, Glucose Level 101, Calcium Level 9.1


2/17/21 11:50: Coronavirus 2019 ([facility]) Negative





Microbiology


2/12/21 Urine Culture - Final, Complete


          Gram Pos Mixed Bacterial Frances


2/12/21 Blood Culture - Preliminary, Resulted


          No growth





Laboratory Tests


2/16/21 05:13








2/17/21 06:05











A/P:


Assessment:





Mental status change, etiology undetermined, managed by Dr Damon / Dr Hollingsworth - 

worse compared to 2/15/21





Intermittent sinus bradycardia w/o any hemodynamic compromise





H/o hypothyroidism


- low TSH on 2/12/21, indicative of iatrogenic hyperthyroidism


Plan:





* Mental status appears to have taken a turn for the worse. Medical svce 

  managing


* Monitor and correct labs











RAY VALLE MD FACP FAC CCDS   Feb 17, 2021 13:42

## 2021-02-17 NOTE — PROGRESS NOTE - CARDIOLOGY
Cardiology SOAP Progress Note


Subjective:


Sitting up in bed


Confused this morning


Oriented to self only


Answering questions inappropriately





Objective:


I&O/Vital Signs











 2/17/21 2/17/21 2/17/21 2/17/21





 08:00 08:00 11:41 15:10


 


Temp  35.4  36.4


 


Pulse  63  85


 


Resp  18  20


 


B/P (MAP)  141/65 (90)  123/87 (99)


 


Pulse Ox  96  99


 


O2 Delivery Room Air Room Air Room Air Room Air














 2/17/21





 00:00


 


Intake Total 2040 ml


 


Output Total 1450 ml


 


Balance 590 ml








Weight (Pounds):  365


Weight (Ounces):  0


Weight (Calculated Kilograms):  165.591448


Constitutional:  No AAO x 3; other (agitated and confused)


Respiratory:  No accessory muscle use; other (fair to good, bilatral air entry)


Cardiovascular:  regular rate-rhythm, S1 and S2, systolic murmur (soft MENDY at 

card base)


Gastrointestional:  No tender, No soft, No guarding, No rebound; audible bowel 

sounds


Extremities:  No clubbing, No cyanosis, No significant edema


Neurologic/Psychiatric:  other (moves all extremities; agitated and confused)


Skin:  No rash on exposed areas, No ulcerations on exposed areas





Results/Procedures:


Labs


Laboratory Tests


2/17/21 06:05: 


White Blood Count 8.9, Red Blood Count 4.65, Hemoglobin 13.2L, Hematocrit 41, 

Mean Corpuscular Volume 87, Mean Corpuscular Hemoglobin 28, Mean Corpuscular 

Hemoglobin Concent 33, Red Cell Distribution Width 15.8H, Platelet Count 181, 

Mean Platelet Volume 11.0, Immature Granulocyte % (Auto) 0, Neutrophils (%) 

(Auto) 67, Lymphocytes (%) (Auto) 18, Monocytes (%) (Auto) 11, Eosinophils (%) 

(Auto) 4, Basophils (%) (Auto) 1, Neutrophils # (Auto) 6.0, Lymphocytes # (Auto)

1.6, Monocytes # (Auto) 0.9, Eosinophils # (Auto) 0.4H, Basophils # (Auto) 0.1, 

Immature Granulocyte # (Auto) 0.0, Sodium Level 139, Potassium Level 4.1, 

Chloride Level 104, Carbon Dioxide Level 26, Anion Gap 9, Blood Urea Nitrogen 9,

Creatinine 0.96, Estimat Glomerular Filtration Rate > 60, BUN/Creatinine Ratio 

9, Glucose Level 101, Calcium Level 9.1


2/17/21 11:50: Coronavirus 2019 ([facility]) Negative





Microbiology


2/12/21 Urine Culture - Final, Complete


          Gram Pos Mixed Bacterial Frances


2/12/21 Blood Culture - Preliminary, Resulted


          No growth








A/P:


Assessment:





Mental status change, etiology undetermined, managed by Dr Damon / Dr Hollingsworth - 

worse compared to 2/15/21





Intermittent sinus bradycardia w/o any hemodynamic compromise





H/o hypothyroidism


- low TSH on 2/12/21, indicative of iatrogenic hyperthyroidism


Plan:





* Mental status appears to have taken a turn for the worse. Medical svce 

  managing


* Monitor and correct labs











YOHANA FLORENTINO           Feb 17, 2021 09:38

## 2021-02-17 NOTE — DISCHARGE SUMMARY
Discharge Summary


Reconcile Patient Problems


Problems Reviewed?:  Yes





Hospital Course


Hospital Course


Date of Admission: Feb 12, 2021 at 19:57 


Admission Diagnosis :  





Family Physician/Provider: Jeremiah Taveras MD  





Date of Discharge: 2/17/21 


Discharge Diagnosis: 


Altered Mental Status


Metabolic Encephalopathy


Alcoholism


HTN


Bradycardia


Weight loss





Hospital Course:


66 yo known alcoholic that presented to ER with altered mental status and 

bradycardia. Patient has been becoming more confused and requiring more 

assistance in the past few admissions. Patient continues to drink alcohol. 

Patient was started on antibiotics to cover for aspiration PNA. He was seen by 

cardiology during this admission. I believe this to be his new baseline mental 

status as his ammonia and labs have been normal for several days without much 

improvement. Patient has also had significant weight loss and would benefit for 

outpatient workup. Patient would benefit from SNF.





Labs and Pending Lab Test:


Laboratory Tests


2/17/21 06:05: 


White Blood Count 8.9, Red Blood Count 4.65, Hemoglobin 13.2L, Hematocrit 41, 

Mean Corpuscular Volume 87, Mean Corpuscular Hemoglobin 28, Mean Corpuscular 

Hemoglobin Concent 33, Red Cell Distribution Width 15.8H, Platelet Count 181, 

Mean Platelet Volume 11.0, Immature Granulocyte % (Auto) 0, Neutrophils (%) 

(Auto) 67, Lymphocytes (%) (Auto) 18, Monocytes (%) (Auto) 11, Eosinophils (%) 

(Auto) 4, Basophils (%) (Auto) 1, Neutrophils # (Auto) 6.0, Lymphocytes # (Auto)

1.6, Monocytes # (Auto) 0.9, Eosinophils # (Auto) 0.4H, Basophils # (Auto) 0.1, 

Immature Granulocyte # (Auto) 0.0, Sodium Level 139, Potassium Level 4.1, 

Chloride Level 104, Carbon Dioxide Level 26, Anion Gap 9, Blood Urea Nitrogen 9,

Creatinine 0.96, Estimat Glomerular Filtration Rate > 60, BUN/Creatinine Ratio 

9, Glucose Level 101, Calcium Level 9.1





Microbiology


2/12/21 Urine Culture - Final, Complete


          Gram Pos Mixed Bacterial Frances


2/12/21 Blood Culture - Preliminary, Resulted


          No growth





Home Meds


Active


Reported


Metoprolol Tartrate 25 Mg Tablet 25 Mg PO BID


Quetiapine Fumarate 100 Mg Tablet 100 Mg PO BID


Acid Reducer (FAMOTIDINE) (Famotidine) 20 Mg Tablet 20 Mg PO BID


Potassium Chloride 20 Meq Tablet.er 20 Meq PO BID


Euthyrox (Levothyroxine Sodium) 100 Mcg Tablet 100 Mcg PO DAILY


Divalproex Sodium ER (Divalproex Sodium) 250 Mg Tab.er.24h 750 Mg PO BID


     TAKES 3 (250MG) TABS


[Folic Acid] 1 Tab 1 Mg PO DAILY


Pantoprazole Sodium 40 Mg Tablet.dr 40 Mg PO DAILY


Follow Up Appt.:  


Latisha Sanders will see patient at [facility]


Skilled NF Admit to:  [facility]


Certification (SNF)


I certify that SNF services are required to be given on an inpatient basis 

because of the above named patient's need for skilled nursing care on a 

continuing basis for the conditions(s) for which he/she was receiving inpatient 

hospital services prior to his/her transfer to the [facility].


Skilled Nursing Facility Order:  Nursing Services, Occupational Ther-Evaluate & 

Treat, Physical Therapy-Evaluate & Treat


Oxygen Delivery Method:  Room Air


Discharge Diet:  Cardiac Diet


Daily Activity as Tolerated:  Yes


Resuscitation Status:  Full Code


Kiara Hollingsworth 


Feb 17, 2021 


11:35





Discharge Physical Exam


General:  Alert, Other (Oriented to self only)


HEENT:  Mucous Memb Moist/Pink


Lungs:  Clear to Auscultation, Normal Air Movement


Heart:  Regular Rate, No Murmurs


Abdomen:  Normal Bowel Sounds, Soft, No Tenderness, No Masses


Extremities:  No Edema, No Tenderness/Swelling


Skin:  No Rashes, No Breakdown


Neuro:  Sensation Intact, Cranial Nerves 3-12 NL


Psych/Mental Status:  Mental Status NL, Mood NL











KIARA HOLLINGSWORTH MD               Feb 17, 2021 11:41

## 2021-02-18 NOTE — PHYSICIAN QUERY CLARIFICATION
PQ-Further Specificity


Admission/Discharge


Admission Date: Feb 12, 2021 at 19:57 


Discharge Date:  Feb 17, 2021 at 18:00


 Dr. Hollingsworth,





The medical record reflects the following clinical scenario:





History/Risk Factors:


AMS,bradycardia, alcoholism, metabolic encephalopathy





Clinical Findings:


alcohol <10, ammonia >45





Treatment:


IVF. CIWL protocol, monitoring





Question:  Can you further specify the etiology of the metabolic encephalopathy 

per the clinical indicators above? 


Please document a response in the Progress Notes or Discharge Summary.





1. metabolic encephalopathy d/t alcohol withdrawal





2. metabolic encephalopathy undetermined etiology





3. Other, with explanation of the clinical findings.





4. Clinically undetermined, no explanation for the clinical findings.





PHYSICIAN RESPONSE


Can you specify per above:  2


Please remember a lack of response to the above will prompt a phone page by 

CDI/Coding staff. 





In responding to this query, please exercise your independent professional 

judgment.  The purpose of this communication is to more accurately reflect the 

complexity of your patients condition. The fact that a question is asked does 

not imply that any particular answer is desired or expected.  





Thank you for your timely response to this clarification.      


   


Requestors name: Vianca   





Phone # [phone]








THIS PHYSICIAN QUERY FORM IS A PERMANENT PART OF THE MEDICAL RECORD











VIANCA GONZALEZ                 Feb 18, 2021 13:15


KIARA HOLLINGSWORTH MD               Feb 24, 2021 13:08

## 2021-03-21 ENCOUNTER — HOSPITAL ENCOUNTER (EMERGENCY)
Dept: HOSPITAL 75 - ER | Age: 66
Discharge: HOME | End: 2021-03-21
Payer: MEDICARE

## 2021-03-21 ENCOUNTER — HOSPITAL ENCOUNTER (EMERGENCY)
Dept: HOSPITAL 75 - ER | Age: 66
Discharge: NURSING, REHAB OR LONG-TERM CARE | End: 2021-03-21
Payer: MEDICARE

## 2021-03-21 VITALS — DIASTOLIC BLOOD PRESSURE: 76 MMHG | SYSTOLIC BLOOD PRESSURE: 118 MMHG

## 2021-03-21 VITALS — DIASTOLIC BLOOD PRESSURE: 72 MMHG | SYSTOLIC BLOOD PRESSURE: 110 MMHG

## 2021-03-21 VITALS — BODY MASS INDEX: 28.4 KG/M2 | HEIGHT: 73.03 IN | WEIGHT: 214.29 LBS

## 2021-03-21 VITALS — HEIGHT: 70.98 IN | BODY MASS INDEX: 27.99 KG/M2 | WEIGHT: 199.96 LBS

## 2021-03-21 DIAGNOSIS — R63.4: ICD-10-CM

## 2021-03-21 DIAGNOSIS — Z79.890: ICD-10-CM

## 2021-03-21 DIAGNOSIS — I95.9: Primary | ICD-10-CM

## 2021-03-21 DIAGNOSIS — I10: ICD-10-CM

## 2021-03-21 DIAGNOSIS — R00.1: ICD-10-CM

## 2021-03-21 DIAGNOSIS — G93.89: ICD-10-CM

## 2021-03-21 DIAGNOSIS — R55: Primary | ICD-10-CM

## 2021-03-21 DIAGNOSIS — E66.9: ICD-10-CM

## 2021-03-21 DIAGNOSIS — E66.8: ICD-10-CM

## 2021-03-21 DIAGNOSIS — H05.20: ICD-10-CM

## 2021-03-21 DIAGNOSIS — Z86.69: ICD-10-CM

## 2021-03-21 DIAGNOSIS — F41.9: ICD-10-CM

## 2021-03-21 DIAGNOSIS — Z98.84: ICD-10-CM

## 2021-03-21 DIAGNOSIS — Z79.899: ICD-10-CM

## 2021-03-21 LAB
ALBUMIN SERPL-MCNC: 3.2 GM/DL (ref 3.2–4.5)
ALP SERPL-CCNC: 44 U/L (ref 40–136)
ALT SERPL-CCNC: 7 U/L (ref 0–55)
AMMONIA PLAS-SCNC: 23 UMOL/L (ref 11–32)
APTT BLD: 32 SEC (ref 24–35)
APTT PPP: YELLOW S
BACTERIA #/AREA URNS HPF: NEGATIVE /HPF
BASOPHILS # BLD AUTO: 0 10^3/UL (ref 0–0.1)
BASOPHILS NFR BLD AUTO: 1 % (ref 0–10)
BILIRUB SERPL-MCNC: 0.3 MG/DL (ref 0.1–1)
BILIRUB UR QL STRIP: NEGATIVE
BUN/CREAT SERPL: 17
CALCIUM SERPL-MCNC: 8.8 MG/DL (ref 8.5–10.1)
CHLORIDE SERPL-SCNC: 100 MMOL/L (ref 98–107)
CO2 SERPL-SCNC: 28 MMOL/L (ref 21–32)
CREAT SERPL-MCNC: 1.06 MG/DL (ref 0.6–1.3)
EOSINOPHIL # BLD AUTO: 0.2 10^3/UL (ref 0–0.3)
EOSINOPHIL NFR BLD AUTO: 2 % (ref 0–10)
FIBRINOGEN PPP-MCNC: (no result) MG/DL
GFR SERPLBLD BASED ON 1.73 SQ M-ARVRAT: > 60 ML/MIN
GLUCOSE SERPL-MCNC: 82 MG/DL (ref 70–105)
GLUCOSE UR STRIP-MCNC: NEGATIVE MG/DL
HCT VFR BLD CALC: 38 % (ref 40–54)
HGB BLD-MCNC: 12.1 G/DL (ref 13.3–17.7)
INR PPP: 1 (ref 0.8–1.4)
KETONES UR QL STRIP: NEGATIVE
LEUKOCYTE ESTERASE UR QL STRIP: (no result)
LYMPHOCYTES # BLD AUTO: 1.8 10^3/UL (ref 1–4)
LYMPHOCYTES NFR BLD AUTO: 21 % (ref 12–44)
MAGNESIUM SERPL-MCNC: 1.8 MG/DL (ref 1.6–2.4)
MANUAL DIFFERENTIAL PERFORMED BLD QL: NO
MCH RBC QN AUTO: 29 PG (ref 25–34)
MCHC RBC AUTO-ENTMCNC: 32 G/DL (ref 32–36)
MCV RBC AUTO: 92 FL (ref 80–99)
MONOCYTES # BLD AUTO: 0.8 10^3/UL (ref 0–1)
MONOCYTES NFR BLD AUTO: 9 % (ref 0–12)
NEUTROPHILS # BLD AUTO: 5.6 10^3/UL (ref 1.8–7.8)
NEUTROPHILS NFR BLD AUTO: 66 % (ref 42–75)
NITRITE UR QL STRIP: NEGATIVE
PH UR STRIP: 7.5 [PH] (ref 5–9)
PLATELET # BLD: 138 10^3/UL (ref 130–400)
PMV BLD AUTO: 10.7 FL (ref 9–12.2)
POTASSIUM SERPL-SCNC: 4.1 MMOL/L (ref 3.6–5)
PROT SERPL-MCNC: 6.9 GM/DL (ref 6.4–8.2)
PROT UR QL STRIP: NEGATIVE
PROTHROMBIN TIME: 13.4 SEC (ref 12.2–14.7)
RBC #/AREA URNS HPF: (no result) /HPF
SODIUM SERPL-SCNC: 138 MMOL/L (ref 135–145)
SP GR UR STRIP: 1.02 (ref 1.02–1.02)
VALPROATE SERPL-MCNC: 77.4 UG/ML (ref 50–100)
WBC # BLD AUTO: 8.5 10^3/UL (ref 4.3–11)
WBC #/AREA URNS HPF: (no result) /HPF

## 2021-03-21 PROCEDURE — 85730 THROMBOPLASTIN TIME PARTIAL: CPT

## 2021-03-21 PROCEDURE — 93005 ELECTROCARDIOGRAM TRACING: CPT

## 2021-03-21 PROCEDURE — 36415 COLL VENOUS BLD VENIPUNCTURE: CPT

## 2021-03-21 PROCEDURE — 71045 X-RAY EXAM CHEST 1 VIEW: CPT

## 2021-03-21 PROCEDURE — 72170 X-RAY EXAM OF PELVIS: CPT

## 2021-03-21 PROCEDURE — 80053 COMPREHEN METABOLIC PANEL: CPT

## 2021-03-21 PROCEDURE — 81000 URINALYSIS NONAUTO W/SCOPE: CPT

## 2021-03-21 PROCEDURE — 84484 ASSAY OF TROPONIN QUANT: CPT

## 2021-03-21 PROCEDURE — 82140 ASSAY OF AMMONIA: CPT

## 2021-03-21 PROCEDURE — 51701 INSERT BLADDER CATHETER: CPT

## 2021-03-21 PROCEDURE — 85025 COMPLETE CBC W/AUTO DIFF WBC: CPT

## 2021-03-21 PROCEDURE — 96360 HYDRATION IV INFUSION INIT: CPT

## 2021-03-21 PROCEDURE — 72125 CT NECK SPINE W/O DYE: CPT

## 2021-03-21 PROCEDURE — 93041 RHYTHM ECG TRACING: CPT

## 2021-03-21 PROCEDURE — 85610 PROTHROMBIN TIME: CPT

## 2021-03-21 PROCEDURE — 83735 ASSAY OF MAGNESIUM: CPT

## 2021-03-21 PROCEDURE — 80320 DRUG SCREEN QUANTALCOHOLS: CPT

## 2021-03-21 PROCEDURE — 80164 ASSAY DIPROPYLACETIC ACD TOT: CPT

## 2021-03-21 PROCEDURE — 96361 HYDRATE IV INFUSION ADD-ON: CPT

## 2021-03-21 PROCEDURE — 70450 CT HEAD/BRAIN W/O DYE: CPT

## 2021-03-21 PROCEDURE — 99284 EMERGENCY DEPT VISIT MOD MDM: CPT

## 2021-03-21 NOTE — DIAGNOSTIC IMAGING REPORT
INDICATION: Pelvic pain. Fall.



FINDINGS: The patient is rotated on this examination and would

limit assessment. There is no evidence to suggest hip

dislocation. The shortened appearance of the left femoral neck

appears to be due to rotation given the AP orientation of the

left obturator ring. There is no evidence of SI joint or pubic

symphysis diastases. No cortical disruption of the pelvic ring

evident. No proximal femoral fracture evident.



IMPRESSION: No evidence of an acute pelvic fracture, pelvic

diastases or hip dislocation. No acute proximal femoral fracture

evident. Please note, however, that there is a somewhat limited

assessment of the left obturator ring and the left femoral neck

given rotation. If left hip pain, dedicated left hip radiographs

are recommended.



Dictated by: 



  Dictated on workstation # [id number]

## 2021-03-21 NOTE — DIAGNOSTIC IMAGING REPORT
INDICATION: Fall. Chest pain.



FINDINGS: The lungs appear clear without focal infiltrate or

consolidation. There is no effusion. There is no pneumothorax.

Heart size and mediastinal contours appear appropriate and

pulmonary vascularity appears normal. There is no identified rib

fracture. There are severe arthritic changes at the left

glenohumeral joint.



IMPRESSION:

1. No radiographic evidence of an acute cardiopulmonary process.

2. No identified fracture.



Dictated by: 



  Dictated on workstation # [id number]
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
## 2021-03-21 NOTE — DIAGNOSTIC IMAGING REPORT
PROCEDURE: CT head and CT cervical spine without contrast.



TECHNIQUE: Multiple contiguous axial images were obtained through

the brain and cervical spine without the use of intravenous

contrast. Sagittal and coronal reformations through the cervical

spine were then performed. Auto Exposure Controls were utilized

during the CT exam to meet ALARA standards for radiation dose

reduction. 



INDICATION:  Trauma. Fall.



Comparison made to prior CT of the head from February 12 of 2021.



Findings: Encephalomalacia and volume loss again demonstrated

within the right temporal lobe. There are moderate background

chronic microvascular changes present within the white matter.

There is stable age-related global volume loss. There are no CT

findings of acute intracranial hemorrhage. There is no evidence

of an abnormal extra-axial collection. There is no  intracranial

mass effect or shift. There is stable ventriculomegaly

proportionate to volume loss present. The basilar cisterns are

patent.



No acute calvarial fracture evident. The mastoids are clear.

There is no  air-fluid level evident within the paranasal

sinuses. The orbital contents are unremarkable.



Cervical spine demonstrates a slight degenerative anterolisthesis

of C7 on T1 due to facet arthropathy. Cranial cervical junction

relationships are maintained. There are normal relationships of

the lateral masses of C1 and C2. The facets are normally aligned.

There is no facet joint or disc space widening. The vertebral

body heights are maintained. There is no acute cervical spine

fracture demonstrated.



There are no findings to suggest high-grade cervical central

canal stenosis.



The lung apices are clear. The soft tissues of the neck

demonstrate no acute process.



IMPRESSION:

1. No CT evidence of an acute intracranial abnormality.

2. Stable right temporal lobe encephalomalacia and background

microvascular change within the white matter.

3. No calvarial fracture.

4. No acute cervical spinal fracture or traumatic malalignment.

There is  a degenerative anterolisthesis C7 on T1. There are no

CT findings to suggest significant cervical central canal

stenosis.



 



Dictated by: 



  Dictated on workstation # [id number]
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
## 2021-08-19 ENCOUNTER — HOSPITAL ENCOUNTER (OUTPATIENT)
Dept: HOSPITAL 75 - RAD | Age: 66
End: 2021-08-19
Attending: NURSE PRACTITIONER
Payer: MEDICARE

## 2021-08-19 DIAGNOSIS — M17.0: Primary | ICD-10-CM

## 2021-08-19 NOTE — DIAGNOSTIC IMAGING REPORT
CLINICAL INDICATION: Patient with knee pain.



EXAM: X-ray of both knees, 3 views each.



COMPARISON: None.



FINDINGS:

X-ray of both knees show no acute fracture or dislocation.



Left knee shows moderate to severe medial compartment narrowing.

There is tricompartmental spurs with moderately hypertrophic

spurs in the patellofemoral compartment and medial compartment.

There is no significant knee effusion. Right knee shows at least

moderate medial compartment narrowing. There is tricompartmental

spurs which are moderately hypertrophic involving the

patellofemoral compartment and medial compartment region.



IMPRESSION:

There is degenerative disease of both knees (left side more than

the right).



Dictated by: 



  Dictated on workstation # [id number]

## 2022-09-28 ENCOUNTER — HOSPITAL ENCOUNTER (EMERGENCY)
Dept: HOSPITAL 75 - ER | Age: 67
Discharge: HOME | End: 2022-09-28
Payer: MEDICARE

## 2022-09-28 VITALS — HEIGHT: 66.93 IN | WEIGHT: 268.96 LBS | BODY MASS INDEX: 42.21 KG/M2

## 2022-09-28 VITALS — DIASTOLIC BLOOD PRESSURE: 86 MMHG | SYSTOLIC BLOOD PRESSURE: 120 MMHG

## 2022-09-28 DIAGNOSIS — R29.6: ICD-10-CM

## 2022-09-28 DIAGNOSIS — Z86.16: ICD-10-CM

## 2022-09-28 DIAGNOSIS — R53.1: Primary | ICD-10-CM

## 2022-09-28 DIAGNOSIS — Z72.0: ICD-10-CM

## 2022-09-28 DIAGNOSIS — E66.9: ICD-10-CM

## 2022-09-28 LAB
ALBUMIN SERPL-MCNC: 3.4 GM/DL (ref 3.2–4.5)
ALP SERPL-CCNC: 39 U/L (ref 40–136)
ALT SERPL-CCNC: 10 U/L (ref 0–55)
APTT PPP: YELLOW S
BACTERIA #/AREA URNS HPF: (no result) /HPF
BASOPHILS # BLD AUTO: 0 10^3/UL (ref 0–0.1)
BASOPHILS NFR BLD AUTO: 1 % (ref 0–10)
BILIRUB SERPL-MCNC: 0.5 MG/DL (ref 0.1–1)
BILIRUB UR QL STRIP: NEGATIVE
BUN/CREAT SERPL: 12
CALCIUM SERPL-MCNC: 8.7 MG/DL (ref 8.5–10.1)
CHLORIDE SERPL-SCNC: 97 MMOL/L (ref 98–107)
CK SERPL-CCNC: 76 U/L (ref 30–200)
CO2 SERPL-SCNC: 28 MMOL/L (ref 21–32)
CREAT SERPL-MCNC: 1.09 MG/DL (ref 0.6–1.3)
EOSINOPHIL # BLD AUTO: 0.2 10^3/UL (ref 0–0.3)
EOSINOPHIL NFR BLD AUTO: 3 % (ref 0–10)
FIBRINOGEN PPP-MCNC: CLEAR MG/DL
GFR SERPLBLD BASED ON 1.73 SQ M-ARVRAT: 75 ML/MIN
GLUCOSE SERPL-MCNC: 79 MG/DL (ref 70–105)
GLUCOSE UR STRIP-MCNC: NEGATIVE MG/DL
HCT VFR BLD CALC: 41 % (ref 40–54)
HGB BLD-MCNC: 13.9 G/DL (ref 13.3–17.7)
KETONES UR QL STRIP: NEGATIVE
LEUKOCYTE ESTERASE UR QL STRIP: (no result)
LYMPHOCYTES # BLD AUTO: 1.6 10^3/UL (ref 1–4)
LYMPHOCYTES NFR BLD AUTO: 28 % (ref 12–44)
MANUAL DIFFERENTIAL PERFORMED BLD QL: NO
MCH RBC QN AUTO: 29 PG (ref 25–34)
MCHC RBC AUTO-ENTMCNC: 34 G/DL (ref 32–36)
MCV RBC AUTO: 87 FL (ref 80–99)
MONOCYTES # BLD AUTO: 0.9 10^3/UL (ref 0–1)
MONOCYTES NFR BLD AUTO: 15 % (ref 0–12)
NEUTROPHILS # BLD AUTO: 2.9 10^3/UL (ref 1.8–7.8)
NEUTROPHILS NFR BLD AUTO: 51 % (ref 42–75)
NITRITE UR QL STRIP: NEGATIVE
PH UR STRIP: 6.5 [PH] (ref 5–9)
PLATELET # BLD: 122 10^3/UL (ref 130–400)
PMV BLD AUTO: (no result) FL (ref 9–12.2)
POTASSIUM SERPL-SCNC: 3.6 MMOL/L (ref 3.6–5)
PROT SERPL-MCNC: 10.9 GM/DL (ref 6.4–8.2)
PROT UR QL STRIP: NEGATIVE
RBC #/AREA URNS HPF: (no result) /HPF
SODIUM SERPL-SCNC: 133 MMOL/L (ref 135–145)
SP GR UR STRIP: 1.02 (ref 1.02–1.02)
SQUAMOUS #/AREA URNS HPF: (no result) /HPF
WBC # BLD AUTO: 5.8 10^3/UL (ref 4.3–11)
WBC #/AREA URNS HPF: (no result) /HPF

## 2022-09-28 PROCEDURE — 85025 COMPLETE CBC W/AUTO DIFF WBC: CPT

## 2022-09-28 PROCEDURE — 81000 URINALYSIS NONAUTO W/SCOPE: CPT

## 2022-09-28 PROCEDURE — 70450 CT HEAD/BRAIN W/O DYE: CPT

## 2022-09-28 PROCEDURE — 80053 COMPREHEN METABOLIC PANEL: CPT

## 2022-09-28 PROCEDURE — 99283 EMERGENCY DEPT VISIT LOW MDM: CPT

## 2022-09-28 PROCEDURE — 36415 COLL VENOUS BLD VENIPUNCTURE: CPT

## 2022-09-28 PROCEDURE — 72125 CT NECK SPINE W/O DYE: CPT

## 2022-09-28 PROCEDURE — 87088 URINE BACTERIA CULTURE: CPT

## 2022-09-28 PROCEDURE — 82550 ASSAY OF CK (CPK): CPT

## 2022-09-28 NOTE — DIAGNOSTIC IMAGING REPORT
PROCEDURE: CT head and CT cervical spine without contrast.



TECHNIQUE: Multiple contiguous axial images were obtained through

the brain and cervical spine without the use of intravenous

contrast. Sagittal and coronal reformations through the cervical

spine were then performed. Auto Exposure Controls were utilized

during the CT exam to meet ALARA standards for radiation dose

reduction. 



INDICATION:  Right back pain and hip pain. Neck pain.



COMPARISON: 03/21/2021



FINDINGS:



The ventricles and cortical sulci are mildly prominent from

generalized parenchymal volume loss. There are areas of

hypoattenuation in the white matter which are likely from chronic

microvascular disease. There is encephalomalacia in the right

temporal lobe from old infarct which is stable since March 2021.

No acute intracranial hemorrhage is seen. There is no midline

shift or mass effect. The calvarium appears intact. Visualized

paranasal sinuses are clear.



There is straightening of the cervical lordosis. There is trace

anterolisthesis at C3-C4. There are mild degenerative changes at

C4-C5 and C5-C6. There is a hemangioma at C7. Vertebral body

heights are preserved. No acute fracture is seen. There is mild

facet arthropathy bilaterally. No bony fragments or hyperdense

fluid collections are seen in the spinal canal. No acute fracture

is seen. Surrounding soft tissues demonstrate no acute

abnormality.



IMPRESSION:

1. No acute intracranial hemorrhage or CT evidence of acute

territorial ischemia.

2. 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

infarct.

3. Mild degenerative change in the cervical spine with no acute

fracture seen.



 



Dictated by: 



  Dictated on workstation # [id number]
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
## 2022-09-30 ENCOUNTER — HOSPITAL ENCOUNTER (EMERGENCY)
Dept: HOSPITAL 75 - ER | Age: 67
Discharge: HOME | End: 2022-09-30
Payer: MEDICARE

## 2022-09-30 VITALS — HEIGHT: 73.03 IN | BODY MASS INDEX: 41.75 KG/M2 | WEIGHT: 315 LBS

## 2022-09-30 VITALS — SYSTOLIC BLOOD PRESSURE: 145 MMHG | DIASTOLIC BLOOD PRESSURE: 84 MMHG

## 2022-09-30 DIAGNOSIS — N39.0: Primary | ICD-10-CM

## 2022-09-30 DIAGNOSIS — E66.9: ICD-10-CM

## 2022-09-30 LAB
ALBUMIN SERPL-MCNC: 3.3 GM/DL (ref 3.2–4.5)
ALP SERPL-CCNC: 50 U/L (ref 40–136)
ALT SERPL-CCNC: 8 U/L (ref 0–55)
APTT PPP: (no result) S
BACTERIA #/AREA URNS HPF: (no result) /HPF
BASOPHILS # BLD AUTO: 0 10^3/UL (ref 0–0.1)
BASOPHILS NFR BLD AUTO: 0 % (ref 0–10)
BILIRUB SERPL-MCNC: 0.4 MG/DL (ref 0.1–1)
BILIRUB UR QL STRIP: NEGATIVE
BUN/CREAT SERPL: 10
CALCIUM SERPL-MCNC: 9.3 MG/DL (ref 8.5–10.1)
CHLORIDE SERPL-SCNC: 99 MMOL/L (ref 98–107)
CO2 SERPL-SCNC: 30 MMOL/L (ref 21–32)
CREAT SERPL-MCNC: 1.21 MG/DL (ref 0.6–1.3)
EOSINOPHIL # BLD AUTO: 0.2 10^3/UL (ref 0–0.3)
EOSINOPHIL NFR BLD AUTO: 2 % (ref 0–10)
FIBRINOGEN PPP-MCNC: CLEAR MG/DL
GFR SERPLBLD BASED ON 1.73 SQ M-ARVRAT: 66 ML/MIN
GLUCOSE SERPL-MCNC: 101 MG/DL (ref 70–105)
GLUCOSE UR STRIP-MCNC: NEGATIVE MG/DL
HCT VFR BLD CALC: 44 % (ref 40–54)
HGB BLD-MCNC: 14.2 G/DL (ref 13.3–17.7)
KETONES UR QL STRIP: NEGATIVE
LEUKOCYTE ESTERASE UR QL STRIP: (no result)
LYMPHOCYTES # BLD AUTO: 1.3 10^3/UL (ref 1–4)
LYMPHOCYTES NFR BLD AUTO: 16 % (ref 12–44)
MANUAL DIFFERENTIAL PERFORMED BLD QL: NO
MCH RBC QN AUTO: 29 PG (ref 25–34)
MCHC RBC AUTO-ENTMCNC: 32 G/DL (ref 32–36)
MCV RBC AUTO: 89 FL (ref 80–99)
MONOCYTES # BLD AUTO: 1.2 10^3/UL (ref 0–1)
MONOCYTES NFR BLD AUTO: 15 % (ref 0–12)
NEUTROPHILS # BLD AUTO: 5 10^3/UL (ref 1.8–7.8)
NEUTROPHILS NFR BLD AUTO: 64 % (ref 42–75)
NITRITE UR QL STRIP: NEGATIVE
PH UR STRIP: 6 [PH] (ref 5–9)
PLATELET # BLD: 200 10^3/UL (ref 130–400)
PMV BLD AUTO: 10.3 FL (ref 9–12.2)
POTASSIUM SERPL-SCNC: 4.4 MMOL/L (ref 3.6–5)
PROT SERPL-MCNC: 8.1 GM/DL (ref 6.4–8.2)
PROT UR QL STRIP: NEGATIVE
RBC #/AREA URNS HPF: (no result) /HPF
SODIUM SERPL-SCNC: 138 MMOL/L (ref 135–145)
SP GR UR STRIP: 1.02 (ref 1.02–1.02)
WBC # BLD AUTO: 7.8 10^3/UL (ref 4.3–11)
WBC #/AREA URNS HPF: (no result) /HPF

## 2022-09-30 PROCEDURE — 71045 X-RAY EXAM CHEST 1 VIEW: CPT

## 2022-09-30 PROCEDURE — 80053 COMPREHEN METABOLIC PANEL: CPT

## 2022-09-30 PROCEDURE — 36415 COLL VENOUS BLD VENIPUNCTURE: CPT

## 2022-09-30 PROCEDURE — 81000 URINALYSIS NONAUTO W/SCOPE: CPT

## 2022-09-30 PROCEDURE — 85025 COMPLETE CBC W/AUTO DIFF WBC: CPT

## 2022-09-30 PROCEDURE — 70450 CT HEAD/BRAIN W/O DYE: CPT

## 2022-09-30 PROCEDURE — 87088 URINE BACTERIA CULTURE: CPT

## 2022-09-30 PROCEDURE — 87077 CULTURE AEROBIC IDENTIFY: CPT

## 2022-09-30 NOTE — DIAGNOSTIC IMAGING REPORT
INDICATION: Fall with confusion.



TECHNIQUE: Multiple contiguous axial images were obtained through

the brain without the use of intravenous contrast. Auto Exposure

Controls were utilized during the CT exam to meet ALARA standards

for radiation dose reduction. 



COMPARISON: Comparison made to prior head CT of 09/28/2022.



FINDINGS: There is encephalomalacia versus old infarct in the

right temporal lobe, appearing similar to the prior study. There

are diffuse atrophic changes. There are patchy low-density

changes in the deep white matter, compatible with chronic

ischemic change, also similar to the prior study. There is no new

intracranial hemorrhage or subdural or epidural collection.

Calvarial windows are unremarkable.



IMPRESSION: Stable chronic changes compared to 09/28/2022.

Chronic ischemic changes in deep white matter are again noted

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

There is no hemorrhage or mass effect or other acute finding.



Dictated by: 



  Dictated on workstation # [id number]
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
## 2022-09-30 NOTE — DIAGNOSTIC IMAGING REPORT
INDICATION: weakness.  



TECHNIQUE: Single-view chest 3:38 p.m.



CORRELATION STUDY: 03/21/2021.



FINDINGS:

Heart size and mediastinum are enlarged and prominent, appearing

changed from prior. Some of this may be attributed to technique.

Vasculature is overall within normal limits.

Hypoventilation. No definitive infiltrate, effusion and/or

pneumothorax. No displaced fracture.



IMPRESSION:

1. Heart size and mediastinum are enlarged and prominent, likely

accentuated by technique.



Dictated by: 



  Dictated on workstation # [id number]

## 2023-03-20 ENCOUNTER — HOSPITAL ENCOUNTER (EMERGENCY)
Dept: HOSPITAL 75 - ER | Age: 68
Discharge: NURSING, REHAB OR LONG-TERM CARE | End: 2023-03-20
Payer: MEDICARE

## 2023-03-20 VITALS — SYSTOLIC BLOOD PRESSURE: 106 MMHG | DIASTOLIC BLOOD PRESSURE: 66 MMHG

## 2023-03-20 VITALS — SYSTOLIC BLOOD PRESSURE: 127 MMHG | DIASTOLIC BLOOD PRESSURE: 102 MMHG

## 2023-03-20 VITALS — HEIGHT: 68.9 IN | BODY MASS INDEX: 46.65 KG/M2 | WEIGHT: 315 LBS

## 2023-03-20 DIAGNOSIS — E66.9: ICD-10-CM

## 2023-03-20 DIAGNOSIS — Z00.00: Primary | ICD-10-CM

## 2023-03-20 LAB
ALBUMIN SERPL-MCNC: 3.2 GM/DL (ref 3.2–4.5)
ALP SERPL-CCNC: 56 U/L (ref 40–136)
ALT SERPL-CCNC: 10 U/L (ref 0–55)
BASOPHILS # BLD AUTO: 0 10^3/UL (ref 0–0.1)
BASOPHILS NFR BLD AUTO: 0 % (ref 0–10)
BILIRUB SERPL-MCNC: 0.4 MG/DL (ref 0.1–1)
BUN/CREAT SERPL: 14
CALCIUM SERPL-MCNC: 9 MG/DL (ref 8.5–10.1)
CHLORIDE SERPL-SCNC: 102 MMOL/L (ref 98–107)
CO2 SERPL-SCNC: 27 MMOL/L (ref 21–32)
CREAT SERPL-MCNC: 1.25 MG/DL (ref 0.6–1.3)
EOSINOPHIL # BLD AUTO: 0.1 10^3/UL (ref 0–0.3)
EOSINOPHIL NFR BLD AUTO: 1 % (ref 0–10)
GFR SERPLBLD BASED ON 1.73 SQ M-ARVRAT: 63 ML/MIN
GLUCOSE SERPL-MCNC: 99 MG/DL (ref 70–105)
HCT VFR BLD CALC: 43 % (ref 40–54)
HGB BLD-MCNC: 14 G/DL (ref 13.3–17.7)
LYMPHOCYTES # BLD AUTO: 1.3 10^3/UL (ref 1–4)
LYMPHOCYTES NFR BLD AUTO: 14 % (ref 12–44)
MAGNESIUM SERPL-MCNC: 1.9 MG/DL (ref 1.6–2.4)
MANUAL DIFFERENTIAL PERFORMED BLD QL: NO
MCH RBC QN AUTO: 29 PG (ref 25–34)
MCHC RBC AUTO-ENTMCNC: 33 G/DL (ref 32–36)
MCV RBC AUTO: 90 FL (ref 80–99)
MONOCYTES # BLD AUTO: 0.9 10^3/UL (ref 0–1)
MONOCYTES NFR BLD AUTO: 9 % (ref 0–12)
NEUTROPHILS # BLD AUTO: 6.8 10^3/UL (ref 1.8–7.8)
NEUTROPHILS NFR BLD AUTO: 74 % (ref 42–75)
PLATELET # BLD: 166 10^3/UL (ref 130–400)
PMV BLD AUTO: 11.1 FL (ref 9–12.2)
POTASSIUM SERPL-SCNC: 4.3 MMOL/L (ref 3.6–5)
PROT SERPL-MCNC: 7.5 GM/DL (ref 6.4–8.2)
SODIUM SERPL-SCNC: 138 MMOL/L (ref 135–145)
VALPROATE SERPL-MCNC: 48.3 UG/ML (ref 50–100)
WBC # BLD AUTO: 9.2 10^3/UL (ref 4.3–11)

## 2023-03-20 PROCEDURE — 80053 COMPREHEN METABOLIC PANEL: CPT

## 2023-03-20 PROCEDURE — 80164 ASSAY DIPROPYLACETIC ACD TOT: CPT

## 2023-03-20 PROCEDURE — 83735 ASSAY OF MAGNESIUM: CPT

## 2023-03-20 PROCEDURE — 93005 ELECTROCARDIOGRAM TRACING: CPT

## 2023-03-20 PROCEDURE — 71045 X-RAY EXAM CHEST 1 VIEW: CPT

## 2023-03-20 PROCEDURE — 36415 COLL VENOUS BLD VENIPUNCTURE: CPT

## 2023-03-20 PROCEDURE — 93041 RHYTHM ECG TRACING: CPT

## 2023-03-20 PROCEDURE — 82947 ASSAY GLUCOSE BLOOD QUANT: CPT

## 2023-03-20 PROCEDURE — 85025 COMPLETE CBC W/AUTO DIFF WBC: CPT
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
## 2023-03-20 NOTE — DIAGNOSTIC IMAGING REPORT
INDICATION: Hypotension.



Single AP view of the chest is obtained with comparison made to

study of 09/30/2022.



FINDINGS: Heart size and mediastinal configuration are stable

without evidence of overt edema. There is no pneumothorax,

consolidation, or pleural fluid.



IMPRESSION: Stable appearance of the chest without acute

abnormality detected.



Dictated by: 



  Dictated on workstation # [id number]

## 2023-05-21 ENCOUNTER — HOSPITAL ENCOUNTER (EMERGENCY)
Dept: HOSPITAL 75 - ER | Age: 68
Discharge: HOME | End: 2023-05-21
Payer: MEDICARE

## 2023-05-21 VITALS — BODY MASS INDEX: 46.77 KG/M2 | WEIGHT: 291.01 LBS | HEIGHT: 66.14 IN

## 2023-05-21 VITALS — DIASTOLIC BLOOD PRESSURE: 76 MMHG | SYSTOLIC BLOOD PRESSURE: 98 MMHG

## 2023-05-21 DIAGNOSIS — Z00.00: Primary | ICD-10-CM

## 2023-05-21 LAB
ALBUMIN SERPL-MCNC: 2.9 GM/DL (ref 3.2–4.5)
ALP SERPL-CCNC: 51 U/L (ref 40–136)
ALT SERPL-CCNC: 8 U/L (ref 0–55)
BASOPHILS # BLD AUTO: 0 10^3/UL (ref 0–0.1)
BASOPHILS NFR BLD AUTO: 1 % (ref 0–10)
BILIRUB SERPL-MCNC: 0.3 MG/DL (ref 0.1–1)
BUN/CREAT SERPL: 14
CALCIUM SERPL-MCNC: 8.6 MG/DL (ref 8.5–10.1)
CHLORIDE SERPL-SCNC: 106 MMOL/L (ref 98–107)
CO2 SERPL-SCNC: 21 MMOL/L (ref 21–32)
CREAT SERPL-MCNC: 1.17 MG/DL (ref 0.6–1.3)
EOSINOPHIL # BLD AUTO: 0.2 10^3/UL (ref 0–0.3)
EOSINOPHIL NFR BLD AUTO: 4 % (ref 0–10)
GFR SERPLBLD BASED ON 1.73 SQ M-ARVRAT: 68 ML/MIN
GLUCOSE SERPL-MCNC: 110 MG/DL (ref 70–105)
HCT VFR BLD CALC: 43 % (ref 40–54)
HGB BLD-MCNC: 13.9 G/DL (ref 13.3–17.7)
LYMPHOCYTES # BLD AUTO: 1.6 10^3/UL (ref 1–4)
LYMPHOCYTES NFR BLD AUTO: 26 % (ref 12–44)
MANUAL DIFFERENTIAL PERFORMED BLD QL: NO
MCH RBC QN AUTO: 29 PG (ref 25–34)
MCHC RBC AUTO-ENTMCNC: 32 G/DL (ref 32–36)
MCV RBC AUTO: 89 FL (ref 80–99)
MONOCYTES # BLD AUTO: 0.5 10^3/UL (ref 0–1)
MONOCYTES NFR BLD AUTO: 8 % (ref 0–12)
NEUTROPHILS # BLD AUTO: 3.8 10^3/UL (ref 1.8–7.8)
NEUTROPHILS NFR BLD AUTO: 62 % (ref 42–75)
PLATELET # BLD: 147 10^3/UL (ref 130–400)
PMV BLD AUTO: 11.2 FL (ref 9–12.2)
POTASSIUM SERPL-SCNC: 4.6 MMOL/L (ref 3.6–5)
PROT SERPL-MCNC: 7 GM/DL (ref 6.4–8.2)
SODIUM SERPL-SCNC: 135 MMOL/L (ref 135–145)
WBC # BLD AUTO: 6.1 10^3/UL (ref 4.3–11)

## 2023-05-21 PROCEDURE — 85025 COMPLETE CBC W/AUTO DIFF WBC: CPT

## 2023-05-21 PROCEDURE — 70450 CT HEAD/BRAIN W/O DYE: CPT

## 2023-05-21 PROCEDURE — 36415 COLL VENOUS BLD VENIPUNCTURE: CPT

## 2023-05-21 PROCEDURE — 80053 COMPREHEN METABOLIC PANEL: CPT

## 2023-05-21 NOTE — DIAGNOSTIC IMAGING REPORT
PROCEDURE: CT head without contrast.



TECHNIQUE: Multiple contiguous axial images were obtained through

the brain without the use of intravenous contrast. Auto Exposure

Controls were utilized during the CT exam to meet ALARA standards

for radiation dose reduction. 



INDICATION:  Seizure-like activity. 



Compared with study 09/30/2022.



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

chronic. Chronic periventricular white matter disease stable.

Cerebral cortical volume is unchanged. No lisa hydrocephalus. No

new site of abnormal parenchymal attenuation. No mass or mass

effect. No hemorrhage. No abnormal extra-axial fluid collections.

No acute appearing abnormalities. Orbits, sinuses and calvarium

nonacute. 



Impression:

Chronic senescent changes and atrophy and old infarct stable from

prior. No hemorrhage, edema or acute appearing abnormality. No

significant change from prior.



Dictated by: 



  Dictated on workstation # [id number]
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
## 2023-10-12 NOTE — DIAGNOSTIC IMAGING REPORT
INDICATION: Vomiting.



EXAMINATION: A supine view of the abdomen was obtained.



FINDINGS: No abnormally dilated loops of bowel. An OG-tube is in

the distal stomach. No mass or calculus is seen.



IMPRESSION: No acute abnormality is seen. 



Dictated by: 



  Dictated on workstation # [id number] You are seen here for chest pain. You need to call and schedule follow-up appointment with your primary care provider for soon as possible. Return to the emergency department immediately if you experience worsening symptoms, develop any new symptoms, or if you have any other concerns.